# Patient Record
Sex: MALE | Race: WHITE | NOT HISPANIC OR LATINO | Employment: OTHER | URBAN - METROPOLITAN AREA
[De-identification: names, ages, dates, MRNs, and addresses within clinical notes are randomized per-mention and may not be internally consistent; named-entity substitution may affect disease eponyms.]

---

## 2017-02-14 DIAGNOSIS — I10 ESSENTIAL HYPERTENSION: ICD-10-CM

## 2017-02-14 RX ORDER — METOPROLOL TARTRATE 100 MG/1
TABLET ORAL
Qty: 60 TABLET | Refills: 0 | Status: SHIPPED | OUTPATIENT
Start: 2017-02-14 | End: 2017-03-10 | Stop reason: SDUPTHER

## 2017-03-09 PROBLEM — I10 HTN (HYPERTENSION): Status: ACTIVE | Noted: 2017-03-09

## 2017-03-09 PROBLEM — F41.9 ANXIETY DISORDER: Status: ACTIVE | Noted: 2017-03-09

## 2017-03-09 PROBLEM — N40.0 BPH WITHOUT URINARY OBSTRUCTION: Status: ACTIVE | Noted: 2017-03-09

## 2017-03-09 PROBLEM — F32.A DEPRESSION: Status: ACTIVE | Noted: 2017-03-09

## 2017-03-10 ENCOUNTER — OFFICE VISIT (OUTPATIENT)
Dept: FAMILY MEDICINE CLINIC | Facility: CLINIC | Age: 57
End: 2017-03-10

## 2017-03-10 VITALS
OXYGEN SATURATION: 99 % | HEART RATE: 66 BPM | SYSTOLIC BLOOD PRESSURE: 116 MMHG | TEMPERATURE: 98.2 F | WEIGHT: 240 LBS | HEIGHT: 76 IN | DIASTOLIC BLOOD PRESSURE: 78 MMHG | BODY MASS INDEX: 29.22 KG/M2 | RESPIRATION RATE: 20 BRPM

## 2017-03-10 DIAGNOSIS — I10 ESSENTIAL HYPERTENSION: ICD-10-CM

## 2017-03-10 DIAGNOSIS — R39.9 UTI SYMPTOMS: Primary | ICD-10-CM

## 2017-03-10 DIAGNOSIS — N41.0 ACUTE PROSTATITIS: ICD-10-CM

## 2017-03-10 LAB
BILIRUB BLD-MCNC: NEGATIVE MG/DL
CLARITY, POC: CLEAR
COLOR UR: ABNORMAL
GLUCOSE UR STRIP-MCNC: NEGATIVE MG/DL
KETONES UR QL: NEGATIVE
LEUKOCYTE EST, POC: NEGATIVE
NITRITE UR-MCNC: NEGATIVE MG/ML
PH UR: 6 [PH] (ref 5–8)
PROT UR STRIP-MCNC: NEGATIVE MG/DL
RBC # UR STRIP: ABNORMAL /UL
SP GR UR: 1.02 (ref 1–1.03)
UROBILINOGEN UR QL: NORMAL

## 2017-03-10 PROCEDURE — 81003 URINALYSIS AUTO W/O SCOPE: CPT | Performed by: FAMILY MEDICINE

## 2017-03-10 PROCEDURE — 99213 OFFICE O/P EST LOW 20 MIN: CPT | Performed by: FAMILY MEDICINE

## 2017-03-10 RX ORDER — SULFAMETHOXAZOLE AND TRIMETHOPRIM 800; 160 MG/1; MG/1
1 TABLET ORAL 2 TIMES DAILY
Qty: 20 TABLET | Refills: 0 | Status: SHIPPED | OUTPATIENT
Start: 2017-03-10 | End: 2017-05-09

## 2017-03-10 RX ORDER — OXCARBAZEPINE 300 MG/1
150 TABLET, FILM COATED ORAL 2 TIMES DAILY
COMMUNITY
Start: 2016-01-13 | End: 2019-09-19

## 2017-03-10 RX ORDER — TAMSULOSIN HYDROCHLORIDE 0.4 MG/1
1 CAPSULE ORAL NIGHTLY
Qty: 15 CAPSULE | Refills: 1 | Status: SHIPPED | OUTPATIENT
Start: 2017-03-10 | End: 2017-04-10 | Stop reason: SDUPTHER

## 2017-03-10 RX ORDER — METOPROLOL TARTRATE 100 MG/1
100 TABLET ORAL 2 TIMES DAILY
Qty: 60 TABLET | Refills: 11 | Status: SHIPPED | OUTPATIENT
Start: 2017-03-10 | End: 2018-03-05 | Stop reason: SDUPTHER

## 2017-03-10 NOTE — PROGRESS NOTES
"Subjective   Randy Asif is a 57 y.o. male.     Urinary Tract Infection    This is a new problem. The current episode started in the past 7 days. The quality of the pain is described as burning. Pain scale: 9 last night today about 2-3. The pain is moderate. There has been no fever. Associated symptoms include frequency (at night) and hesitancy. He has tried increased fluids for the symptoms. The treatment provided no relief.   Hypertension   This is a recurrent problem. The current episode started more than 1 year ago. The problem is controlled (with Metoprolol tartrate 100 mg twice a day). Risk factors for coronary artery disease include male gender. There are no compliance problems.         The following portions of the patient's history were reviewed and updated as appropriate: allergies, current medications, past social history and problem list.    Review of Systems   Genitourinary: Positive for difficulty urinating, frequency (at night) and hesitancy. Negative for discharge.       Objective   Visit Vitals   • /78 (BP Location: Right arm, Patient Position: Sitting)   • Pulse 66   • Temp 98.2 °F (36.8 °C) (Oral)   • Resp 20   • Ht 76\" (193 cm)   • Wt 240 lb (109 kg)   • SpO2 99%   • BMI 29.21 kg/m2     Physical Exam   Constitutional: He appears well-developed and well-nourished.   Cardiovascular: Normal rate and regular rhythm.    Pulmonary/Chest: Effort normal and breath sounds normal.   Abdominal: Soft. Bowel sounds are normal. There is no tenderness.   Nursing note and vitals reviewed.      Assessment/Plan   Problem List Items Addressed This Visit        Cardiovascular and Mediastinum    HTN (hypertension)      Other Visit Diagnoses     UTI symptoms    -  Primary    Relevant Orders    POCT urinalysis dipstick, automated (Completed)    Acute prostatitis                  Do hot tub soaks for discomfort.    Rx for Tamsulosin 4 mg at bedtime #15+1.    Rx for Bactrim DS mg twice a day for 10 " days.#20+0.    Refill sent for Metoprolol tartrate 100 mg twice a day #60+11.    Follow up as needed.      Scribed for Dr Obinna Toney by Rhea Merrill CMA.    I, Obinna Toney MD, personally performed the services described in this documentation, as scribed by Rhea Merrill in my presence, and is both accurate and complete.

## 2017-04-10 DIAGNOSIS — N41.0 ACUTE PROSTATITIS: ICD-10-CM

## 2017-04-10 RX ORDER — TAMSULOSIN HYDROCHLORIDE 0.4 MG/1
CAPSULE ORAL
Qty: 15 CAPSULE | Refills: 0 | Status: SHIPPED | OUTPATIENT
Start: 2017-04-10 | End: 2017-04-23 | Stop reason: SDUPTHER

## 2017-04-12 ENCOUNTER — TELEPHONE (OUTPATIENT)
Dept: FAMILY MEDICINE CLINIC | Facility: CLINIC | Age: 57
End: 2017-04-12

## 2017-04-12 RX ORDER — SULFAMETHOXAZOLE AND TRIMETHOPRIM 800; 160 MG/1; MG/1
1 TABLET ORAL 2 TIMES DAILY
Qty: 20 TABLET | Refills: 0 | Status: SHIPPED | OUTPATIENT
Start: 2017-04-12 | End: 2017-05-09

## 2017-04-12 NOTE — TELEPHONE ENCOUNTER
----- Message from Leigh Gimenez sent at 4/12/2017  8:06 AM EDT -----  Contact: [phone]  PATIENT NEEDS SOMETHING CALLED IN FOR HIS PROSTATITIS. PATIENT WAS SEEN LAST WEEK        HOLLIS WILLS

## 2017-04-23 DIAGNOSIS — N41.0 ACUTE PROSTATITIS: ICD-10-CM

## 2017-04-24 RX ORDER — TAMSULOSIN HYDROCHLORIDE 0.4 MG/1
CAPSULE ORAL
Qty: 15 CAPSULE | Refills: 0 | Status: SHIPPED | OUTPATIENT
Start: 2017-04-24 | End: 2017-05-17 | Stop reason: SDUPTHER

## 2017-05-09 ENCOUNTER — OFFICE VISIT (OUTPATIENT)
Dept: FAMILY MEDICINE CLINIC | Facility: CLINIC | Age: 57
End: 2017-05-09

## 2017-05-09 VITALS
WEIGHT: 244 LBS | BODY MASS INDEX: 29.71 KG/M2 | OXYGEN SATURATION: 98 % | HEART RATE: 68 BPM | HEIGHT: 76 IN | TEMPERATURE: 97.9 F | DIASTOLIC BLOOD PRESSURE: 68 MMHG | RESPIRATION RATE: 16 BRPM | SYSTOLIC BLOOD PRESSURE: 124 MMHG

## 2017-05-09 DIAGNOSIS — L23.7 POISON IVY: Primary | ICD-10-CM

## 2017-05-09 PROBLEM — G40.209 COMPLEX PARTIAL EPILEPSY (HCC): Status: ACTIVE | Noted: 2017-05-09

## 2017-05-09 PROCEDURE — 99213 OFFICE O/P EST LOW 20 MIN: CPT | Performed by: FAMILY MEDICINE

## 2017-05-09 PROCEDURE — 96372 THER/PROPH/DIAG INJ SC/IM: CPT | Performed by: FAMILY MEDICINE

## 2017-05-09 RX ORDER — METHYLPREDNISOLONE 4 MG/1
TABLET ORAL
Qty: 1 EACH | Refills: 0 | Status: SHIPPED | OUTPATIENT
Start: 2017-05-09 | End: 2017-09-07

## 2017-05-09 RX ORDER — METHYLPREDNISOLONE ACETATE 40 MG/ML
40 INJECTION, SUSPENSION INTRA-ARTICULAR; INTRALESIONAL; INTRAMUSCULAR; SOFT TISSUE ONCE
Status: COMPLETED | OUTPATIENT
Start: 2017-05-09 | End: 2017-05-09

## 2017-05-09 RX ADMIN — METHYLPREDNISOLONE ACETATE 40 MG: 40 INJECTION, SUSPENSION INTRA-ARTICULAR; INTRALESIONAL; INTRAMUSCULAR; SOFT TISSUE at 11:27

## 2017-05-17 DIAGNOSIS — N41.0 ACUTE PROSTATITIS: ICD-10-CM

## 2017-05-17 RX ORDER — TAMSULOSIN HYDROCHLORIDE 0.4 MG/1
CAPSULE ORAL
Qty: 15 CAPSULE | Refills: 0 | Status: SHIPPED | OUTPATIENT
Start: 2017-05-17 | End: 2017-09-07

## 2017-09-07 ENCOUNTER — OFFICE VISIT (OUTPATIENT)
Dept: FAMILY MEDICINE CLINIC | Facility: CLINIC | Age: 57
End: 2017-09-07

## 2017-09-07 VITALS
TEMPERATURE: 99.3 F | SYSTOLIC BLOOD PRESSURE: 122 MMHG | OXYGEN SATURATION: 95 % | RESPIRATION RATE: 18 BRPM | DIASTOLIC BLOOD PRESSURE: 72 MMHG | HEART RATE: 70 BPM | WEIGHT: 247 LBS | HEIGHT: 76 IN | BODY MASS INDEX: 30.08 KG/M2

## 2017-09-07 DIAGNOSIS — I10 ESSENTIAL HYPERTENSION: Primary | ICD-10-CM

## 2017-09-07 PROCEDURE — 99213 OFFICE O/P EST LOW 20 MIN: CPT | Performed by: FAMILY MEDICINE

## 2017-09-07 RX ORDER — INDAPAMIDE 1.25 MG/1
1.25 TABLET, FILM COATED ORAL EVERY MORNING
Qty: 30 TABLET | Refills: 5 | Status: SHIPPED | OUTPATIENT
Start: 2017-09-07 | End: 2017-09-13 | Stop reason: ALTCHOICE

## 2017-09-07 NOTE — PROGRESS NOTES
"Subjective   Randy JAILENE Asif is a 57 y.o. male.     History of Present Illness   The patient is here for a follow up on Hypertension.    He states he is doing well.  Denies any chest pain or shortness of breath.    BP today is  122/72. 150/90 sitting and 155/95 standing on my recheck.  Taking Metoprolol Tartrate 100 mg twice a day.    States he has been having some elevated readings the past 2 weeks.  He has been drinking more iced tea recently.          The following portions of the patient's history were reviewed and updated as appropriate: allergies, current medications, past social history and problem list.    Review of Systems   Constitutional: Negative for diaphoresis and unexpected weight change.   HENT: Negative for congestion.    Eyes: Negative for visual disturbance.   Respiratory: Negative for cough and shortness of breath.    Cardiovascular: Negative for chest pain.   Gastrointestinal: Negative for nausea.   Neurological: Negative for syncope and numbness.       Objective   /72  Pulse 70  Temp 99.3 °F (37.4 °C)  Resp 18  Ht 76\" (193 cm)  Wt 247 lb (112 kg)  SpO2 95%  BMI 30.07 kg/m2  Physical Exam   Constitutional: He is oriented to person, place, and time. He appears well-developed and well-nourished.   Cardiovascular: Normal rate, regular rhythm and normal heart sounds.    Pulmonary/Chest: Effort normal and breath sounds normal. No respiratory distress. He has no wheezes.   Neurological: He is alert and oriented to person, place, and time.   Nursing note and vitals reviewed.      Assessment/Plan   Problem List Items Addressed This Visit        Cardiovascular and Mediastinum    HTN (hypertension) - Primary              Drink plenty fluids.  Avoid added salt    Monitor BP at home as doing.    Continue medications as doing.    Add Indapamide 1.25 mg daily #30+5.    Follow up in 3 month. Sooner if needed.          Scribed for Dr Obinna Toney by Rhea Merrill CMA.            I, Obinna Toney " MD, personally performed the services described in this documentation, as scribed by Rhea Merrill in my presence, and is both accurate and complete.

## 2017-09-13 ENCOUNTER — TELEPHONE (OUTPATIENT)
Dept: FAMILY MEDICINE CLINIC | Facility: CLINIC | Age: 57
End: 2017-09-13

## 2017-09-13 DIAGNOSIS — I10 ESSENTIAL HYPERTENSION: Primary | ICD-10-CM

## 2017-09-13 RX ORDER — LOSARTAN POTASSIUM 25 MG/1
25 TABLET ORAL DAILY
Qty: 30 TABLET | Refills: 5 | Status: SHIPPED | OUTPATIENT
Start: 2017-09-13 | End: 2017-12-27 | Stop reason: SINTOL

## 2017-09-13 NOTE — TELEPHONE ENCOUNTER
I spoke to the patient on the telephone.  He tells me that the indapamide is causing muscle cramps.  We will discontinue that medication and place him instead on losartan 25 mg once a day and given a prescription for 30 with 5 refills.  Continue follow up as noted.

## 2017-12-27 ENCOUNTER — TELEPHONE (OUTPATIENT)
Dept: FAMILY MEDICINE CLINIC | Facility: CLINIC | Age: 57
End: 2017-12-27

## 2017-12-27 DIAGNOSIS — I10 ESSENTIAL HYPERTENSION: Primary | ICD-10-CM

## 2017-12-27 RX ORDER — AMLODIPINE BESYLATE 5 MG/1
2.5 TABLET ORAL DAILY
Qty: 30 TABLET | Refills: 2 | Status: SHIPPED | OUTPATIENT
Start: 2017-12-27 | End: 2017-12-27 | Stop reason: SDUPTHER

## 2017-12-27 RX ORDER — AMLODIPINE BESYLATE 5 MG/1
5 TABLET ORAL DAILY
Qty: 30 TABLET | Refills: 2 | Status: SHIPPED | OUTPATIENT
Start: 2017-12-27 | End: 2018-06-07 | Stop reason: ALTCHOICE

## 2017-12-27 NOTE — TELEPHONE ENCOUNTER
I talked to the patient on the telephone.  He believes that the losartan caused muscle cramps in his arms and legs.  He would like to try different medication.  I will have him discontinue the losartan.  We will call in a prescription for amlodipine 2.5 mg to be taken once daily prescription for 30 with 2 refills.  He'll return to see us in that time.

## 2018-03-05 DIAGNOSIS — I10 ESSENTIAL HYPERTENSION: ICD-10-CM

## 2018-03-06 RX ORDER — METOPROLOL TARTRATE 100 MG/1
TABLET ORAL
Qty: 60 TABLET | Refills: 10 | Status: SHIPPED | OUTPATIENT
Start: 2018-03-06 | End: 2018-05-29 | Stop reason: ALTCHOICE

## 2018-03-15 ENCOUNTER — OFFICE VISIT (OUTPATIENT)
Dept: FAMILY MEDICINE CLINIC | Facility: CLINIC | Age: 58
End: 2018-03-15

## 2018-03-15 VITALS
OXYGEN SATURATION: 97 % | DIASTOLIC BLOOD PRESSURE: 100 MMHG | HEART RATE: 71 BPM | SYSTOLIC BLOOD PRESSURE: 168 MMHG | WEIGHT: 249.2 LBS | BODY MASS INDEX: 30.33 KG/M2 | TEMPERATURE: 99.2 F | RESPIRATION RATE: 18 BRPM

## 2018-03-15 DIAGNOSIS — I10 ESSENTIAL HYPERTENSION: Primary | ICD-10-CM

## 2018-03-15 PROCEDURE — 99213 OFFICE O/P EST LOW 20 MIN: CPT | Performed by: NURSE PRACTITIONER

## 2018-03-16 NOTE — PROGRESS NOTES
Subjective   Randy Asif is a 58 y.o. male.     Hypertension   Chronicity: blood pressure elevated past 2 days. The problem is unchanged. (None) Treatments tried: Norvasc 5mg, Lopressor 100mg. Compliance problems: Patient has missed two days of his Norvasc and states has been eating salty foods past few days (popcorn)  Treatments tried: Norvasc 5mg, Lopressor 100mg.       The following portions of the patient's history were reviewed and updated as appropriate: allergies, current medications, past family history, past medical history, past social history, past surgical history and problem list.  Allergies   Allergen Reactions   • Indapamide Other (See Comments)     Muscle cramps     • Percocet [Oxycodone-Acetaminophen]      Review of Systems   Constitutional: Negative.    HENT: Negative.    Respiratory: Negative.    Cardiovascular: Negative.    Gastrointestinal: Negative.    Musculoskeletal: Negative.    Neurological: Negative.    Psychiatric/Behavioral: Negative.        Objective   Physical Exam   Constitutional: He is oriented to person, place, and time. He appears well-developed and well-nourished. He is cooperative.   Cardiovascular: Normal rate, regular rhythm and normal heart sounds.    Pulmonary/Chest: Effort normal and breath sounds normal.   Neurological: He is alert and oriented to person, place, and time.   Skin: Skin is warm and dry.   Vitals reviewed.    Vitals:    03/15/18 1603   BP: 168/100   Pulse: 71   Resp: 18   Temp: 99.2 °F (37.3 °C)   SpO2: 97%     Assessment/Plan   Randy was seen today for hypertension.    Diagnoses and all orders for this visit:    Essential hypertension    Patient advised to resume taking Norvasc, monitor blood pressures at home and keep a log of readings. Patient to go to ER if blood pressure worsens or any chest pain, SOA, diaphoresis. Patient also advised to decrease sodium intake and increase water intake.  Discussed the nature of the medical condition(s) risks,  complications, implications, management, safe and proper use of medications. Encouraged medication compliance, and keeping scheduled follow up appointments with me and any other providers.

## 2018-03-16 NOTE — PATIENT INSTRUCTIONS
"DASH Eating Plan  DASH stands for \"Dietary Approaches to Stop Hypertension.\" The DASH eating plan is a healthy eating plan that has been shown to reduce high blood pressure (hypertension). It may also reduce your risk for type 2 diabetes, heart disease, and stroke. The DASH eating plan may also help with weight loss.  What are tips for following this plan?  General guidelines   · Avoid eating more than 2,300 mg (milligrams) of salt (sodium) a day. If you have hypertension, you may need to reduce your sodium intake to 1,500 mg a day.  · Limit alcohol intake to no more than 1 drink a day for nonpregnant women and 2 drinks a day for men. One drink equals 12 oz of beer, 5 oz of wine, or 1½ oz of hard liquor.  · Work with your health care provider to maintain a healthy body weight or to lose weight. Ask what an ideal weight is for you.  · Get at least 30 minutes of exercise that causes your heart to beat faster (aerobic exercise) most days of the week. Activities may include walking, swimming, or biking.  · Work with your health care provider or diet and nutrition specialist (dietitian) to adjust your eating plan to your individual calorie needs.  Reading food labels   · Check food labels for the amount of sodium per serving. Choose foods with less than 5 percent of the Daily Value of sodium. Generally, foods with less than 300 mg of sodium per serving fit into this eating plan.  · To find whole grains, look for the word \"whole\" as the first word in the ingredient list.  Shopping   · Buy products labeled as \"low-sodium\" or \"no salt added.\"  · Buy fresh foods. Avoid canned foods and premade or frozen meals.  Cooking   · Avoid adding salt when cooking. Use salt-free seasonings or herbs instead of table salt or sea salt. Check with your health care provider or pharmacist before using salt substitutes.  · Do not rivera foods. Cook foods using healthy methods such as baking, boiling, grilling, and broiling instead.  · Cook with " heart-healthy oils, such as olive, canola, soybean, or sunflower oil.  Meal planning     · Eat a balanced diet that includes:  ¨ 5 or more servings of fruits and vegetables each day. At each meal, try to fill half of your plate with fruits and vegetables.  ¨ Up to 6-8 servings of whole grains each day.  ¨ Less than 6 oz of lean meat, poultry, or fish each day. A 3-oz serving of meat is about the same size as a deck of cards. One egg equals 1 oz.  ¨ 2 servings of low-fat dairy each day.  ¨ A serving of nuts, seeds, or beans 5 times each week.  ¨ Heart-healthy fats. Healthy fats called Omega-3 fatty acids are found in foods such as flaxseeds and coldwater fish, like sardines, salmon, and mackerel.  · Limit how much you eat of the following:  ¨ Canned or prepackaged foods.  ¨ Food that is high in trans fat, such as fried foods.  ¨ Food that is high in saturated fat, such as fatty meat.  ¨ Sweets, desserts, sugary drinks, and other foods with added sugar.  ¨ Full-fat dairy products.  · Do not salt foods before eating.  · Try to eat at least 2 vegetarian meals each week.  · Eat more home-cooked food and less restaurant, buffet, and fast food.  · When eating at a restaurant, ask that your food be prepared with less salt or no salt, if possible.  What foods are recommended?  The items listed may not be a complete list. Talk with your dietitian about what dietary choices are best for you.  Grains   Whole-grain or whole-wheat bread. Whole-grain or whole-wheat pasta. Brown rice. Oatmeal. Quinoa. Bulgur. Whole-grain and low-sodium cereals. Tracey bread. Low-fat, low-sodium crackers. Whole-wheat flour tortillas.  Vegetables   Fresh or frozen vegetables (raw, steamed, roasted, or grilled). Low-sodium or reduced-sodium tomato and vegetable juice. Low-sodium or reduced-sodium tomato sauce and tomato paste. Low-sodium or reduced-sodium canned vegetables.  Fruits   All fresh, dried, or frozen fruit. Canned fruit in natural juice  (without added sugar).  Meat and other protein foods   Skinless chicken or turkey. Ground chicken or turkey. Pork with fat trimmed off. Fish and seafood. Egg whites. Dried beans, peas, or lentils. Unsalted nuts, nut butters, and seeds. Unsalted canned beans. Lean cuts of beef with fat trimmed off. Low-sodium, lean deli meat.  Dairy   Low-fat (1%) or fat-free (skim) milk. Fat-free, low-fat, or reduced-fat cheeses. Nonfat, low-sodium ricotta or cottage cheese. Low-fat or nonfat yogurt. Low-fat, low-sodium cheese.  Fats and oils   Soft margarine without trans fats. Vegetable oil. Low-fat, reduced-fat, or light mayonnaise and salad dressings (reduced-sodium). Canola, safflower, olive, soybean, and sunflower oils. Avocado.  Seasoning and other foods   Herbs. Spices. Seasoning mixes without salt. Unsalted popcorn and pretzels. Fat-free sweets.  What foods are not recommended?  The items listed may not be a complete list. Talk with your dietitian about what dietary choices are best for you.  Grains   Baked goods made with fat, such as croissants, muffins, or some breads. Dry pasta or rice meal packs.  Vegetables   Creamed or fried vegetables. Vegetables in a cheese sauce. Regular canned vegetables (not low-sodium or reduced-sodium). Regular canned tomato sauce and paste (not low-sodium or reduced-sodium). Regular tomato and vegetable juice (not low-sodium or reduced-sodium). Pickles. Olives.  Fruits   Canned fruit in a light or heavy syrup. Fried fruit. Fruit in cream or butter sauce.  Meat and other protein foods   Fatty cuts of meat. Ribs. Fried meat. Garcia. Sausage. Bologna and other processed lunch meats. Salami. Fatback. Hotdogs. Bratwurst. Salted nuts and seeds. Canned beans with added salt. Canned or smoked fish. Whole eggs or egg yolks. Chicken or turkey with skin.  Dairy   Whole or 2% milk, cream, and half-and-half. Whole or full-fat cream cheese. Whole-fat or sweetened yogurt. Full-fat cheese. Nondairy creamers.  Whipped toppings. Processed cheese and cheese spreads.  Fats and oils   Butter. Stick margarine. Lard. Shortening. Ghee. Garcia fat. Tropical oils, such as coconut, palm kernel, or palm oil.  Seasoning and other foods   Salted popcorn and pretzels. Onion salt, garlic salt, seasoned salt, table salt, and sea salt. Worcestershire sauce. Tartar sauce. Barbecue sauce. Teriyaki sauce. Soy sauce, including reduced-sodium. Steak sauce. Canned and packaged gravies. Fish sauce. Oyster sauce. Cocktail sauce. Horseradish that you find on the shelf. Ketchup. Mustard. Meat flavorings and tenderizers. Bouillon cubes. Hot sauce and Tabasco sauce. Premade or packaged marinades. Premade or packaged taco seasonings. Relishes. Regular salad dressings.  Where to find more information:  · National Heart, [employer] [place]: www.nhlbi.nih.gov  · American Heart Association: www.heart.org  Summary  · The DASH eating plan is a healthy eating plan that has been shown to reduce high blood pressure (hypertension). It may also reduce your risk for type 2 diabetes, heart disease, and stroke.  · With the DASH eating plan, you should limit salt (sodium) intake to 2,300 mg a day. If you have hypertension, you may need to reduce your sodium intake to 1,500 mg a day.  · When on the DASH eating plan, aim to eat more fresh fruits and vegetables, whole grains, lean proteins, low-fat dairy, and heart-healthy fats.  · Work with your health care provider or diet and nutrition specialist (dietitian) to adjust your eating plan to your individual calorie needs.  This information is not intended to replace advice given to you by your health care provider. Make sure you discuss any questions you have with your health care provider.  Document Released: 12/06/2012 Document Revised: 12/11/2017 Document Reviewed: 12/11/2017  [employer] Interactive Patient Education © 2017 [employer]  Managing Your Hypertension  Hypertension is commonly called high blood  "pressure. This is when the force of your blood pressing against the walls of your arteries is too strong. Arteries are blood vessels that carry blood from your heart throughout your body. Hypertension forces the heart to work harder to pump blood, and may cause the arteries to become narrow or stiff. Having untreated or uncontrolled hypertension can cause heart attack, stroke, kidney disease, and other problems.  What are blood pressure readings?  A blood pressure reading consists of a higher number over a lower number. Ideally, your blood pressure should be below 120/80. The first (\"top\") number is called the systolic pressure. It is a measure of the pressure in your arteries as your heart beats. The second (\"bottom\") number is called the diastolic pressure. It is a measure of the pressure in your arteries as the heart relaxes.  What does my blood pressure reading mean?  Blood pressure is classified into four stages. Based on your blood pressure reading, your health care provider may use the following stages to determine what type of treatment you need, if any. Systolic pressure and diastolic pressure are measured in a unit called mm Hg.  Normal   · Systolic pressure: below 120.  · Diastolic pressure: below 80.  Elevated   · Systolic pressure: 120-129.  · Diastolic pressure: below 80.  Hypertension stage 1   ·   · Diastolic pressure: 80-89.  Hypertension stage 2   · Systolic pressure: 140 or above.  · Diastolic pressure: 90 or above.  What health risks are associated with hypertension?  Managing your hypertension is an important responsibility. Uncontrolled hypertension can lead to:  · A heart attack.  · A stroke.  · A weakened blood vessel (aneurysm).  · Heart failure.  · Kidney damage.  · Eye damage.  · Metabolic syndrome.  · Memory and concentration problems.  What changes can I make to manage my hypertension?  Eating and drinking   · Eat a diet that is high in fiber and potassium, and low in salt (sodium), " added sugar, and fat. An example eating plan is called the DASH (Dietary Approaches to Stop Hypertension) diet. To eat this way:  ¨ Eat plenty of fresh fruits and vegetables. Try to fill half of your plate at each meal with fruits and vegetables.  ¨ Eat whole grains, such as whole wheat pasta, brown rice, or whole grain bread. Fill about one quarter of your plate with whole grains.  ¨ Eat low-fat diary products.  ¨ Avoid fatty cuts of meat, processed or cured meats, and poultry with skin. Fill about one quarter of your plate with lean proteins such as fish, chicken without skin, beans, eggs, and tofu.  ¨ Avoid premade and processed foods. These tend to be higher in sodium, added sugar, and fat.  ·   ·   Lifestyle   · Work with your health care provider to maintain a healthy body weight, or to lose weight. Ask what an ideal weight is for you.  · Get at least 30 minutes of exercise that causes your heart to beat faster (aerobic exercise) most days of the week. Activities may include walking, swimming, or biking.  ·   ·   ·   Monitoring   · Monitor your blood pressure at home as told by your health care provider. Your personal target blood pressure may vary depending on your medical conditions, your age, and other factors.  · Have your blood pressure checked regularly, as often as told by your health care provider.  Working with your health care provider   · Review all the medicines you take with your health care provider because there may be side effects or interactions.  · Talk with your health care provider about your diet, exercise habits, and other lifestyle factors that may be contributing to hypertension.  · Visit your health care provider regularly. Your health care provider can help you create and adjust your plan for managing hypertension.  Will I need medicine to control my blood pressure?  Your health care provider may prescribe medicine if lifestyle changes are not enough to get your blood pressure under  control, and if:  · Your systolic blood pressure is 130 or higher.  · Your diastolic blood pressure is 80 or higher.  Take medicines only as told by your health care provider. Follow the directions carefully. Blood pressure medicines must be taken as prescribed. The medicine does not work as well when you skip doses. Skipping doses also puts you at risk for problems.  Contact a health care provider if:  · You think you are having a reaction to medicines you have taken.  · You have repeated (recurrent) headaches.  · You feel dizzy.  · You have swelling in your ankles.  · You have trouble with your vision.  Get help right away if:  · You develop a severe headache or confusion.  · You have unusual weakness or numbness, or you feel faint.  · You have severe pain in your chest or abdomen.  · You vomit repeatedly.  · You have trouble breathing.  Summary  · Hypertension is when the force of blood pumping through your arteries is too strong. If this condition is not controlled, it may put you at risk for serious complications.  · Your personal target blood pressure may vary depending on your medical conditions, your age, and other factors. For most people, a normal blood pressure is less than 120/80.  · Hypertension is managed by lifestyle changes, medicines, or both. Lifestyle changes include weight loss, eating a healthy, low-sodium diet, exercising more, and limiting alcohol.  This information is not intended to replace advice given to you by your health care provider. Make sure you discuss any questions you have with your health care provider.  Document Released: 09/11/2013 Document Revised: 11/15/2017 Document Reviewed: 11/15/2017  [employer] Interactive Patient Education © 2017 [employer]    How to Take Your Blood Pressure  Blood pressure is a measurement of how strongly your blood is pressing against the walls of your arteries. Arteries are blood vessels that carry blood from your heart throughout your body. Your  health care provider takes your blood pressure at each office visit. You can also take your own blood pressure at home with a blood pressure machine. You may need to take your own blood pressure:  · To confirm a diagnosis of high blood pressure (hypertension).  · To monitor your blood pressure over time.  · To make sure your blood pressure medicine is working.  Supplies needed:  To take your blood pressure, you will need a blood pressure machine. You can buy a blood pressure machine, or blood pressure monitor, at most [employer] or online. There are several types of home blood pressure monitors. When choosing one, consider the following:  · Choose a monitor that has an arm cuff.  · Choose a monitor that wraps snugly around your upper arm. You should be able to fit only one finger between your arm and the cuff.  · Do not choose a monitor that measures your blood pressure from your wrist or finger.  Your health care provider can suggest a reliable monitor that will meet your needs.  How to prepare  To get the most accurate reading, avoid the following for 30 minutes before you check your blood pressure:  · Drinking caffeine.  · Drinking alcohol.  · Eating.  · Smoking.  · Exercising.  Five minutes before you check your blood pressure:  · Empty your bladder.  · Sit quietly without talking in a dining chair, rather than in a soft couch or armchair.  How to take your blood pressure  To check your blood pressure, follow the instructions in the manual that came with your blood pressure monitor. If you have a digital blood pressure monitor, the instructions may be as follows:  1. Sit up straight.  2. Place your feet on the floor. Do not cross your ankles or legs.  3. Rest your left arm at the level of your heart on a table or desk or on the arm of a chair.  4. Pull up your shirt sleeve.  5. Wrap the blood pressure cuff around the upper part of your left arm, 1 inch (2.5 cm) above your elbow. It is best to wrap the cuff  "around bare skin.  6. Fit the cuff snugly around your arm. You should be able to place only one finger between the cuff and your arm.  7. Position the cord inside the groove of your elbow.  8. Press the power button.  9. Sit quietly while the cuff inflates and deflates.  10. Read the digital reading on the monitor screen and write it down (record it).  11. Wait 2-3 minutes, then repeat the steps, starting at step 1.  What does my blood pressure reading mean?  A blood pressure reading consists of a higher number over a lower number. Ideally, your blood pressure should be below 120/80. The first (\"top\") number is called the systolic pressure. It is a measure of the pressure in your arteries as your heart beats. The second (\"bottom\") number is called the diastolic pressure. It is a measure of the pressure in your arteries as the heart relaxes.  Blood pressure is classified into four stages. The following are the stages for adults who do not have a short-term serious illness or a chronic condition. Systolic pressure and diastolic pressure are measured in a unit called mm Hg.  Normal   · Systolic pressure: below 120.  · Diastolic pressure: below 80.  Elevated   · Systolic pressure: 120-129.  · Diastolic pressure: below 80.  Hypertension stage 1   · Systolic pressure: 130-139.  · Diastolic pressure: 80-89.  Hypertension stage 2   · Systolic pressure: 140 or above.  · Diastolic pressure: 90 or above.  You can have prehypertension or hypertension even if only the systolic or only the diastolic number in your reading is higher than normal.  Follow these instructions at home:  · Check your blood pressure as often as recommended by your health care provider.  · Take your monitor to the next appointment with your health care provider to make sure:  ¨ That you are using it correctly.  ¨ That it provides accurate readings.  · Be sure you understand what your goal blood pressure numbers are.  · Tell your health care provider if " you are having any side effects from blood pressure medicine.  Contact a health care provider if:  · Your blood pressure is consistently high.  Get help right away if:  · Your systolic blood pressure is higher than 180.  · Your diastolic blood pressure is higher than 110.  This information is not intended to replace advice given to you by your health care provider. Make sure you discuss any questions you have with your health care provider.  Document Released: 05/26/2017 Document Revised: 08/08/2017 Document Reviewed: 05/26/2017  [employer] Interactive Patient Education © 2017 [employer]

## 2018-03-20 ENCOUNTER — TELEPHONE (OUTPATIENT)
Dept: FAMILY MEDICINE CLINIC | Facility: CLINIC | Age: 58
End: 2018-03-20

## 2018-03-20 DIAGNOSIS — I10 ESSENTIAL HYPERTENSION: Primary | ICD-10-CM

## 2018-03-20 RX ORDER — LOSARTAN POTASSIUM 50 MG/1
50 TABLET ORAL DAILY
Qty: 30 TABLET | Refills: 2 | Status: SHIPPED | OUTPATIENT
Start: 2018-03-20 | End: 2018-03-22 | Stop reason: SDUPTHER

## 2018-03-21 NOTE — TELEPHONE ENCOUNTER
Spoke with patient and discussed blood pressure medication. Patient agreeable to stop Norvasc and begin Losartan 50mg/day per recommendation Dr. Toney. Patient advised to monitor blood pressure at home and keep a log of readings.

## 2018-03-22 ENCOUNTER — TELEPHONE (OUTPATIENT)
Dept: FAMILY MEDICINE CLINIC | Facility: CLINIC | Age: 58
End: 2018-03-22

## 2018-03-22 DIAGNOSIS — I10 ESSENTIAL HYPERTENSION: ICD-10-CM

## 2018-03-22 RX ORDER — SILDENAFIL CITRATE 20 MG/1
20 TABLET ORAL 2 TIMES DAILY PRN
Qty: 10 TABLET | Refills: 2 | OUTPATIENT
Start: 2018-03-22 | End: 2018-03-30

## 2018-03-22 RX ORDER — LOSARTAN POTASSIUM 50 MG/1
100 TABLET ORAL DAILY
Qty: 30 TABLET | Refills: 2 | OUTPATIENT
Start: 2018-03-22 | End: 2018-06-07 | Stop reason: SDUPTHER

## 2018-03-22 NOTE — TELEPHONE ENCOUNTER
Per dr francis, tell him to increase losartan to 100mg one tablet daily in the am # 30 +2 and sildenafil 20 mg two tablets daily prn for ED #10 + 2. Pt has been notified and voiced his understanding.  ----- Message from Sarahi Wu MA sent at 3/22/2018  2:35 PM EDT -----  Contact: [phone]  Pt stated BP medication  works for 12 hours then starts going up. Pt would also like an rx for viagra.

## 2018-03-30 ENCOUNTER — TELEPHONE (OUTPATIENT)
Dept: FAMILY MEDICINE CLINIC | Facility: CLINIC | Age: 58
End: 2018-03-30

## 2018-03-30 RX ORDER — TADALAFIL 10 MG/1
10 TABLET ORAL DAILY PRN
Qty: 5 TABLET | Refills: 5 | Status: SHIPPED | OUTPATIENT
Start: 2018-03-30 | End: 2018-04-10 | Stop reason: ALTCHOICE

## 2018-03-30 NOTE — TELEPHONE ENCOUNTER
----- Message from Taty Vargas sent at 3/30/2018  1:42 PM EDT -----  Contact: [phone]  PT STATES BP MEDS ARE WORKING EXTREMELY WELL AND PT WOULD LIKE TO SWITCH FROM VIAGRA TO REHAN SHAFER

## 2018-04-10 RX ORDER — SILDENAFIL CITRATE 20 MG/1
TABLET ORAL
Qty: 10 TABLET | Refills: 1 | Status: SHIPPED | OUTPATIENT
Start: 2018-04-10 | End: 2018-04-20 | Stop reason: SDUPTHER

## 2018-04-20 RX ORDER — SILDENAFIL CITRATE 20 MG/1
TABLET ORAL
Qty: 10 TABLET | Refills: 0 | Status: SHIPPED | OUTPATIENT
Start: 2018-04-20 | End: 2018-05-07 | Stop reason: SDUPTHER

## 2018-04-24 RX ORDER — SILDENAFIL CITRATE 20 MG/1
TABLET ORAL
Qty: 10 TABLET | Refills: 0 | OUTPATIENT
Start: 2018-04-24

## 2018-05-04 ENCOUNTER — TELEPHONE (OUTPATIENT)
Dept: FAMILY MEDICINE CLINIC | Facility: CLINIC | Age: 58
End: 2018-05-04

## 2018-05-07 RX ORDER — SILDENAFIL CITRATE 20 MG/1
20 TABLET ORAL AS NEEDED
Qty: 10 TABLET | Refills: 0 | Status: SHIPPED | OUTPATIENT
Start: 2018-05-07 | End: 2018-05-29 | Stop reason: SDUPTHER

## 2018-05-11 RX ORDER — SILDENAFIL CITRATE 20 MG/1
TABLET ORAL
Qty: 10 TABLET | Refills: 0 | OUTPATIENT
Start: 2018-05-11

## 2018-05-29 ENCOUNTER — OFFICE VISIT (OUTPATIENT)
Dept: FAMILY MEDICINE CLINIC | Facility: CLINIC | Age: 58
End: 2018-05-29

## 2018-05-29 VITALS
RESPIRATION RATE: 18 BRPM | HEART RATE: 70 BPM | DIASTOLIC BLOOD PRESSURE: 88 MMHG | WEIGHT: 226.4 LBS | BODY MASS INDEX: 27.56 KG/M2 | SYSTOLIC BLOOD PRESSURE: 142 MMHG | OXYGEN SATURATION: 97 % | TEMPERATURE: 99 F

## 2018-05-29 DIAGNOSIS — R31.9 HEMATURIA, UNSPECIFIED TYPE: ICD-10-CM

## 2018-05-29 DIAGNOSIS — Z76.0 MEDICATION REFILL: ICD-10-CM

## 2018-05-29 DIAGNOSIS — R30.0 DYSURIA: ICD-10-CM

## 2018-05-29 DIAGNOSIS — N41.0 ACUTE PROSTATITIS: Primary | ICD-10-CM

## 2018-05-29 LAB
BILIRUB BLD-MCNC: NEGATIVE MG/DL
CLARITY, POC: CLEAR
COLOR UR: YELLOW
GLUCOSE UR STRIP-MCNC: NEGATIVE MG/DL
KETONES UR QL: ABNORMAL
LEUKOCYTE EST, POC: NEGATIVE
NITRITE UR-MCNC: NEGATIVE MG/ML
PH UR: 6 [PH] (ref 5–8)
PROT UR STRIP-MCNC: NEGATIVE MG/DL
RBC # UR STRIP: ABNORMAL /UL
SP GR UR: 1.01 (ref 1–1.03)
UROBILINOGEN UR QL: NORMAL

## 2018-05-29 PROCEDURE — 81003 URINALYSIS AUTO W/O SCOPE: CPT | Performed by: NURSE PRACTITIONER

## 2018-05-29 PROCEDURE — 99214 OFFICE O/P EST MOD 30 MIN: CPT | Performed by: NURSE PRACTITIONER

## 2018-05-29 PROCEDURE — 87086 URINE CULTURE/COLONY COUNT: CPT | Performed by: NURSE PRACTITIONER

## 2018-05-29 RX ORDER — SILDENAFIL CITRATE 20 MG/1
20 TABLET ORAL AS NEEDED
Qty: 10 TABLET | Refills: 0 | Status: SHIPPED | OUTPATIENT
Start: 2018-05-29 | End: 2018-06-04 | Stop reason: SDUPTHER

## 2018-05-29 RX ORDER — SULFAMETHOXAZOLE AND TRIMETHOPRIM 800; 160 MG/1; MG/1
1 TABLET ORAL 2 TIMES DAILY
Qty: 20 TABLET | Refills: 0 | Status: SHIPPED | OUTPATIENT
Start: 2018-05-29 | End: 2018-06-08

## 2018-05-29 NOTE — PATIENT INSTRUCTIONS
Dysuria  Dysuria is pain or discomfort while urinating. The pain or discomfort may be felt in the tube that carries urine out of the bladder (urethra) or in the surrounding tissue of the genitals. The pain may also be felt in the groin area, lower abdomen, and lower back. You may have to urinate frequently or have the sudden feeling that you have to urinate (urgency). Dysuria can affect both men and women, but is more common in women.  Dysuria can be caused by many different things, including:  · Urinary tract infection in women.  · Infection of the kidney or bladder.  · Kidney stones or bladder stones.  · Certain sexually transmitted infections (STIs), such as chlamydia.  · Dehydration.  · Inflammation of the vagina.  · Use of certain medicines.  · Use of certain soaps or scented products that cause irritation.  Follow these instructions at home:  Watch your dysuria for any changes. The following actions may help to reduce any discomfort you are feeling:  · Drink enough fluid to keep your urine clear or pale yellow.  · Empty your bladder often. Avoid holding urine for long periods of time.  · After a bowel movement or urination, women should cleanse from front to back, using each tissue only once.  · Empty your bladder after sexual intercourse.  · Take medicines only as directed by your health care provider.  · If you were prescribed an antibiotic medicine, finish it all even if you start to feel better.  · Avoid caffeine, tea, and alcohol. They can irritate the bladder and make dysuria worse. In men, alcohol may irritate the prostate.  · Keep all follow-up visits as directed by your health care provider. This is important.  · If you had any tests done to find the cause of dysuria, it is your responsibility to obtain your test results. Ask the lab or department performing the test when and how you will get your results. Talk with your health care provider if you have any questions about your results.  Contact a  health care provider if:  · You develop pain in your back or sides.  · You have a fever.  · You have nausea or vomiting.  · You have blood in your urine.  · You are not urinating as often as you usually do.  Get help right away if:  · You pain is severe and not relieved with medicines.  · You are unable to hold down any fluids.  · You or someone else notices a change in your mental function.  · You have a rapid heartbeat at rest.  · You have shaking or chills.  · You feel extremely weak.  This information is not intended to replace advice given to you by your health care provider. Make sure you discuss any questions you have with your health care provider.  Document Released: 09/15/2005 Document Revised: 05/25/2017 Document Reviewed: 08/13/2015  [employer] Interactive Patient Education © 2017 [employer]    Hematuria, Adult  Hematuria is blood in your urine. It can be caused by a bladder infection, kidney infection, prostate infection, kidney stone, or cancer of your urinary tract. Infections can usually be treated with medicine, and a kidney stone usually will pass through your urine. If neither of these is the cause of your hematuria, further workup to find out the reason may be needed.  It is very important that you tell your health care provider about any blood you see in your urine, even if the blood stops without treatment or happens without causing pain. Blood in your urine that happens and then stops and then happens again can be a symptom of a very serious condition. Also, pain is not a symptom in the initial stages of many urinary cancers.  Follow these instructions at home:  · Drink lots of fluid, 3-4 quarts a day. If you have been diagnosed with an infection, cranberry juice is especially recommended, in addition to large amounts of water.  · Avoid caffeine, tea, and carbonated beverages because they tend to irritate the bladder.  · Avoid alcohol because it may irritate the prostate.  · Take all  medicines as directed by your health care provider.  · If you were prescribed an antibiotic medicine, finish it all even if you start to feel better.  · If you have been diagnosed with a kidney stone, follow your health care provider's instructions regarding straining your urine to catch the stone.  · Empty your bladder often. Avoid holding urine for long periods of time.  · After a bowel movement, women should cleanse front to back. Use each tissue only once.  · Empty your bladder before and after sexual intercourse if you are a female.  Contact a health care provider if:  · You develop back pain.  · You have a fever.  · You have a feeling of sickness in your stomach (nausea) or vomiting.  · Your symptoms are not better in 3 days. Return sooner if you are getting worse.  Get help right away if:  · You develop severe vomiting and are unable to keep the medicine down.  · You develop severe back or abdominal pain despite taking your medicines.  · You begin passing a large amount of blood or clots in your urine.  · You feel extremely weak or faint, or you pass out.  This information is not intended to replace advice given to you by your health care provider. Make sure you discuss any questions you have with your health care provider.  Document Released: 12/18/2006 Document Revised: 05/25/2017 Document Reviewed: 08/18/2014  [employer] Interactive Patient Education © 2017 [employer]    Prostatitis  Prostatitis is swelling or inflammation of the prostate gland. The prostate is a walnut-sized gland that is involved in the production of semen. It is located below a man's bladder, in front of the rectum.  There are four types of prostatitis:  · Chronic nonbacterial prostatitis. This is the most common type of prostatitis. It may be associated with a viral infection or autoimmune disorder.  · Acute bacterial prostatitis. This is the least common type of prostatitis. It starts quickly and is usually associated with a bladder  infection, high fever, and shaking chills. It can occur at any age.  · Chronic bacterial prostatitis. This type usually results from acute bacterial prostatitis that happens repeatedly (is recurrent) or has not been treated properly. It can occur in men of any age but is most common among middle-aged men whose prostate has begun to get larger. The symptoms are not as severe as symptoms caused by acute bacterial prostatitis.  · Prostatodynia or chronic pelvic pain syndrome (CPPS). This type is also called pelvic floor disorder. It is associated with increased muscular tone in the pelvis surrounding the prostate.  What are the causes?  Bacterial prostatitis is caused by infection from bacteria. Chronic nonbacterial prostatitis may be caused by:  · Urinary tract infections (UTIs).  · Nerve damage.  · A response by the body’s disease-fighting system (autoimmune response).  · Chemicals in the urine.  The causes of the other types of prostatitis are usually not known.  What are the signs or symptoms?  Symptoms of this condition vary depending upon the type of prostatitis. If you have acute bacterial prostatitis, you may experience:  · Urinary symptoms, such as:  ¨ Painful urination.  ¨ Burning during urination.  ¨ Frequent and sudden urges to urinate.  ¨ Inability to start urinating.  ¨ A weak or interrupted stream of urine.  · Vomiting.  · Nausea.  · Fever.  · Chills.  · Inability to empty the bladder completely.  · Pain in the:  ¨ Muscles or joints.  ¨ Lower back.  ¨ Lower abdomen.  If you have any of the other types of prostatitis, you may experience:  · Urinary symptoms, such as:  ¨ Sudden urges to urinate.  ¨ Frequent urination.  ¨ Difficulty starting urination.  ¨ Weak urine stream.  ¨ Dribbling after urination.  · Discharge from the urethra. The urethra is a tube that opens at the end of the penis.  · Pain in the:  ¨ Testicles.  ¨ Penis or tip of the penis.  ¨ Rectum.  ¨ Area in front of the rectum and below the  scrotum (perineum).  · Problems with sexual function.  · Painful ejaculation.  · Bloody semen.  How is this diagnosed?  This condition may be diagnosed based on:  · A physical and medical exam.  · Your symptoms.  · A urine test to check for bacteria.  · An exam in which a health care provider uses a finger to feel the prostate (digital rectal exam).  · A test of a sample of semen.  · Blood tests.  · Ultrasound.  · Removal of prostate tissue to be examined under a microscope (biopsy).  · Tests to check how your body handles urine (urodynamic tests).  · A test to look inside your bladder or urethra (cystoscopy).  How is this treated?  Treatment for this condition depends on the type of prostatitis. Treatment may involve:  · Medicines to relieve pain or inflammation.  · Medicines to help relax your muscles.  · Physical therapy.  · Heat therapy.  · Techniques to help you control certain body functions (biofeedback).  · Relaxation exercises.  · Antibiotic medicine, if your condition is caused by bacteria.  · Warm water baths (sitz baths). Sitz baths help with relaxing your pelvic floor muscles, which helps to relieve pressure on the prostate.  Follow these instructions at home:  · Take over-the-counter and prescription medicines only as told by your health care provider.  · If you were prescribed an antibiotic, take it as told by your health care provider. Do not stop taking the antibiotic even if you start to feel better.  · If physical therapy, biofeedback, or relaxation exercises were prescribed, do exercises as instructed.  · Take sitz baths as directed by your health care provider. For a sitz bath, sit in warm water that is deep enough to cover your hips and buttocks.  · Keep all follow-up visits as told by your health care provider. This is important.  Contact a health care provider if:  · Your symptoms get worse.  · You have a fever.  Get help right away if:  · You have chills.  · You feel nauseous.  · You  vomit.  · You feel light-headed or feel like you are going to faint.  · You are unable to urinate.  · You have blood or blood clots in your urine.  This information is not intended to replace advice given to you by your health care provider. Make sure you discuss any questions you have with your health care provider.  Document Released: 12/15/2001 Document Revised: 09/07/2017 Document Reviewed: 09/07/2017  [employer] Interactive Patient Education © 2017 [employer]  Sulfamethoxazole; Trimethoprim, SMX-TMP tablets  What is this medicine?  SULFAMETHOXAZOLE; TRIMETHOPRIM or SMX-TMP (suhl fuh meth OK harjit zohl; trye METH oh prim) is a combination of a sulfonamide antibiotic and a second antibiotic, trimethoprim. It is used to treat or prevent certain kinds of bacterial infections. It will not work for colds, flu, or other viral infections.  This medicine may be used for other purposes; ask your health care provider or pharmacist if you have questions.  COMMON BRAND NAME(S): Bacter-Aid DS, Bactrim, Bactrim DS, Septra, Septra DS  What should I tell my health care provider before I take this medicine?  They need to know if you have any of these conditions:  -anemia  -asthma  -being treated with anticonvulsants  -if you frequently drink alcohol containing drinks  -kidney disease  -liver disease  -low level of folic acid or glucose-6-phosphate dehydrogenase  -poor nutrition or malabsorption  -porphyria  -severe allergies  -thyroid disorder  -an unusual or allergic reaction to sulfamethoxazole, trimethoprim, sulfa drugs, other medicines, foods, dyes, or preservatives  -pregnant or trying to get pregnant  -breast-feeding  How should I use this medicine?  Take this medicine by mouth with a full glass of water. Follow the directions on the prescription label. Take your medicine at regular intervals. Do not take it more often than directed. Do not skip doses or stop your medicine early.  Talk to your pediatrician regarding the use  of this medicine in children. Special care may be needed. This medicine has been used in children as young as 2 months of age.  Overdosage: If you think you have taken too much of this medicine contact a poison control center or emergency room at once.  NOTE: This medicine is only for you. Do not share this medicine with others.  What if I miss a dose?  If you miss a dose, take it as soon as you can. If it is almost time for your next dose, take only that dose. Do not take double or extra doses.  What may interact with this medicine?  Do not take this medicine with any of the following medications:  -aminobenzoate potassium  -dofetilide  -metronidazole  This medicine may also interact with the following medications:  -ACE inhibitors like benazepril, enalapril, lisinopril, and ramipril  -birth control pills  -cyclosporine  -digoxin  -diuretics  -indomethacin  -medicines for diabetes  -methenamine  -methotrexate  -phenytoin  -potassium supplements  -pyrimethamine  -sulfinpyrazone  -tricyclic antidepressants  -warfarin  This list may not describe all possible interactions. Give your health care provider a list of all the medicines, herbs, non-prescription drugs, or dietary supplements you use. Also tell them if you smoke, drink alcohol, or use illegal drugs. Some items may interact with your medicine.  What should I watch for while using this medicine?  Tell your doctor or health care professional if your symptoms do not improve. Drink several glasses of water a day to reduce the risk of kidney problems.  Do not treat diarrhea with over the counter products. Contact your doctor if you have diarrhea that lasts more than 2 days or if it is severe and watery.  This medicine can make you more sensitive to the sun. Keep out of the sun. If you cannot avoid being in the sun, wear protective clothing and use a sunscreen. Do not use sun lamps or tanning beds/booths.  What side effects may I notice from receiving this  medicine?  Side effects that you should report to your doctor or health care professional as soon as possible:  -allergic reactions like skin rash or hives, swelling of the face, lips, or tongue  -breathing problems  -fever or chills, sore throat  -irregular heartbeat, chest pain  -joint or muscle pain  -pain or difficulty passing urine  -red pinpoint spots on skin  -redness, blistering, peeling or loosening of the skin, including inside the mouth  -unusual bleeding or bruising  -unusually weak or tired  -yellowing of the eyes or skin  Side effects that usually do not require medical attention (report to your doctor or health care professional if they continue or are bothersome):  -diarrhea  -dizziness  -headache  -loss of appetite  -nausea, vomiting  -nervousness  This list may not describe all possible side effects. Call your doctor for medical advice about side effects. You may report side effects to FDA at 3-530-FDA-4413.  Where should I keep my medicine?  Keep out of the reach of children.  Store at room temperature between 20 to 25 degrees C (68 to 77 degrees F). Protect from light. Throw away any unused medicine after the expiration date.  NOTE: This sheet is a summary. It may not cover all possible information. If you have questions about this medicine, talk to your doctor, pharmacist, or health care provider.  © 2018 [employer] (2014-07-25 14:38:26)  Sildenafil tablets (Revatio)  What is this medicine?  SILDENAFIL (debra DEN a dion) is used to treat pulmonary arterial hypertension. This is a serious heart and lung condition. This medicine helps to improve symptoms and quality of life.  This medicine may be used for other purposes; ask your health care provider or pharmacist if you have questions.  COMMON BRAND NAME(S): Revatio  What should I tell my health care provider before I take this medicine?  They need to know if you have any of these conditions:  -anatomical deformation of the penis,  Peyronie's disease, or history of priapism (painful and prolonged erection)  -bleeding disorders  -eye disease, vision problems  -heart disease  -high or low blood pressure  -history of blood diseases, like sickle cell anemia or leukemia  -kidney disease  -liver disease  -pulmonary veno-occlusive disease (PVOD)  -stomach ulcer  -an unusual or allergic reaction to sildenafil, other medicines, foods, dyes, or preservatives  -pregnant or trying to get pregnant  -breast-feeding  How should I use this medicine?  Take this medicine by mouth with a glass of water. Follow the directions on the prescription label. You can take it with or without food. If it upsets your stomach, take it with food. Take your doses at regular intervals about 4 to 6 hours apart. Do not take it more often than directed. Do not stop taking except on your doctor's advice.  Talk to your pediatrician regarding the use of this medicine in children. This medicine is not approved for use in children.  Overdosage: If you think you have taken too much of this medicine contact a poison control center or emergency room at once.  NOTE: This medicine is only for you. Do not share this medicine with others.  What if I miss a dose?  If you miss a dose, take it as soon as you can. If it is almost time for your next dose, take only that dose. Do not take double or extra doses.  What may interact with this medicine?  Do not take this medicine with any of the following medications:  -cisapride  -cobicistat  -nitrates like amyl nitrite, isosorbide dinitrate, isosorbide mononitrate, nitroglycerin  -riociguat  -telaprevir  This medicine may also interact with the following medications:  -antiviral medicines for HIV or AIDS  -bosentan  -certain medicines for benign prostatic hyperplasia (BPH)  -certain medicines for blood pressure  -certain medicines for fungal infections like ketoconazole and itraconazole  -cimetidine  -erythromycin  -rifampin  This list may not  describe all possible interactions. Give your health care provider a list of all the medicines, herbs, non-prescription drugs, or dietary supplements you use. Also tell them if you smoke, drink alcohol, or use illegal drugs. Some items may interact with your medicine.  What should I watch for while using this medicine?  Tell your doctor or healthcare professional if your symptoms do not start to get better or if they get worse.  Tell your doctor or health care professional right away if you have any change in your eyesight or hearing.  You may get dizzy. Do not drive, use machinery, or do anything that needs mental alertness until you know how this medicine affects you. Do not stand or sit up quickly, especially if you are an older patient. This reduces the risk of dizzy or fainting spells. Avoid alcoholic drinks; they can make you more dizzy.  What side effects may I notice from receiving this medicine?  Side effects that you should report to your doctor or health care professional as soon as possible:  -allergic reactions like skin rash, itching or hives, swelling of the face, lips, or tongue  -breathing problems  -changes in vision  -chest pain  -decreased hearing  -fast, irregular heartbeat  -men: prolonged or painful erection (lasting more than 4 hours)  Side effects that usually do not require medical attention (report to your doctor or health care professional if they continue or are bothersome):  -facial flushing  -headache  -nosebleed  -trouble sleeping  -upset stomach  This list may not describe all possible side effects. Call your doctor for medical advice about side effects. You may report side effects to FDA at 5-409-FDA-2195.  Where should I keep my medicine?  Keep out of reach of children.  Store at room temperature between 15 and 30 degrees C (59 and 86 degrees F). Throw away any unused medicine after the expiration date.  NOTE: This sheet is a summary. It may not cover all possible information. If  you have questions about this medicine, talk to your doctor, pharmacist, or health care provider.  © 2018 [employer] (2016-11-30 17:18:06)

## 2018-05-30 NOTE — PROGRESS NOTES
Subjective   Randy Asif is a 58 y.o. male.     Difficulty Urinating   This is a new problem. Episode onset: 2-3 days ago. The problem occurs daily. The problem has been gradually worsening. Associated symptoms include urinary symptoms. Pertinent negatives include no abdominal pain, anorexia, arthralgias, change in bowel habit, chest pain, chills, diaphoresis, fatigue, fever, myalgias, nausea, rash or vomiting. Nothing aggravates the symptoms. He has tried drinking for the symptoms. The treatment provided no relief.       The following portions of the patient's history were reviewed and updated as appropriate: allergies, current medications, past family history, past medical history, past social history, past surgical history and problem list.    Review of Systems   Constitutional: Negative for activity change, appetite change, chills, diaphoresis, fatigue and fever.   Respiratory: Negative.    Cardiovascular: Negative.  Negative for chest pain.   Gastrointestinal: Negative.  Negative for abdominal pain, anorexia, change in bowel habit, nausea and vomiting.   Genitourinary: Positive for difficulty urinating, dysuria and hematuria (small amount during urination today). Negative for flank pain.   Musculoskeletal: Negative for arthralgias and myalgias.   Skin: Negative for rash.       Objective   Physical Exam   Constitutional: He is oriented to person, place, and time. Vital signs are normal. He appears well-developed and well-nourished. He is cooperative. No distress.   HENT:   Mouth/Throat: Uvula is midline and mucous membranes are normal.   Cardiovascular: Normal rate, regular rhythm and normal heart sounds.    Pulmonary/Chest: Effort normal and breath sounds normal.   Abdominal: Soft. Normal appearance. He exhibits no distension. There is no tenderness. There is no CVA tenderness.   Neurological: He is alert and oriented to person, place, and time.   Skin: Skin is warm, dry and intact. No rash noted. He is not  diaphoretic.   Psychiatric: He has a normal mood and affect. His behavior is normal. Judgment and thought content normal.   Vitals reviewed.    Vitals:    05/29/18 1533   BP: 142/88   Pulse: 70   Resp: 18   Temp: 99 °F (37.2 °C)   SpO2: 97%   Body mass index is 27.56 kg/m².     Assessment/Plan   Randy was seen today for urinary tract infection.    Diagnoses and all orders for this visit:    Acute prostatitis  -     Urine Culture - Urine, Urine, Clean Catch  -     sulfamethoxazole-trimethoprim (BACTRIM DS) 800-160 MG per tablet; Take 1 tablet by mouth 2 (Two) Times a Day for 10 days.    Dysuria  -     Urine Culture - Urine, Urine, Clean Catch  -     sulfamethoxazole-trimethoprim (BACTRIM DS) 800-160 MG per tablet; Take 1 tablet by mouth 2 (Two) Times a Day for 10 days.    Hematuria, unspecified type  -     POCT urinalysis dipstick, automated  -     Urine Culture - Urine, Urine, Clean Catch    Medication refill  -     sildenafil (REVATIO) 20 MG tablet; Take 1 tablet by mouth As Needed (ED).       Brief Urine Lab Results  (Last result in the past 365 days)      Color   Clarity   Blood   Leuk Est   Nitrite   Protein   CREAT   Urine HCG        05/29/18 1543 Yellow Clear Moderate(A) Negative Negative Negative             Discussed possibility of kidney stones with patient- advised to go to ER if signs and symptoms develop, in particular fever, chills, worsening hematuria, back or abdominal pain. Patient verbalized understanding and agreement with plan of care.  Discussed the nature of the medical condition(s) risks, complications, implications, management, safe and proper use of medications. Encouraged medication compliance, and keeping scheduled follow up appointments with me and any other providers.   F/U with Dr. Toney if symptoms fail to improve.

## 2018-05-31 LAB — BACTERIA SPEC AEROBE CULT: NORMAL

## 2018-06-04 DIAGNOSIS — Z76.0 MEDICATION REFILL: ICD-10-CM

## 2018-06-04 RX ORDER — SILDENAFIL CITRATE 20 MG/1
TABLET ORAL
Qty: 10 TABLET | Refills: 0 | Status: SHIPPED | OUTPATIENT
Start: 2018-06-04 | End: 2018-06-07 | Stop reason: SDUPTHER

## 2018-06-07 ENCOUNTER — OFFICE VISIT (OUTPATIENT)
Dept: FAMILY MEDICINE CLINIC | Facility: CLINIC | Age: 58
End: 2018-06-07

## 2018-06-07 VITALS
BODY MASS INDEX: 27.76 KG/M2 | DIASTOLIC BLOOD PRESSURE: 84 MMHG | WEIGHT: 228 LBS | SYSTOLIC BLOOD PRESSURE: 114 MMHG | HEIGHT: 76 IN | OXYGEN SATURATION: 98 % | HEART RATE: 65 BPM | RESPIRATION RATE: 16 BRPM

## 2018-06-07 DIAGNOSIS — I10 ESSENTIAL HYPERTENSION: ICD-10-CM

## 2018-06-07 DIAGNOSIS — N52.9 ERECTILE DYSFUNCTION, UNSPECIFIED ERECTILE DYSFUNCTION TYPE: ICD-10-CM

## 2018-06-07 DIAGNOSIS — R39.9 UTI SYMPTOMS: Primary | ICD-10-CM

## 2018-06-07 LAB
BILIRUB BLD-MCNC: NEGATIVE MG/DL
CLARITY, POC: CLEAR
COLOR UR: YELLOW
GLUCOSE UR STRIP-MCNC: NEGATIVE MG/DL
KETONES UR QL: NEGATIVE
LEUKOCYTE EST, POC: NEGATIVE
NITRITE UR-MCNC: NEGATIVE MG/ML
PH UR: 6 [PH] (ref 5–8)
PROT UR STRIP-MCNC: NEGATIVE MG/DL
RBC # UR STRIP: NEGATIVE /UL
SP GR UR: 1.01 (ref 1–1.03)
UROBILINOGEN UR QL: NORMAL

## 2018-06-07 PROCEDURE — 81003 URINALYSIS AUTO W/O SCOPE: CPT | Performed by: FAMILY MEDICINE

## 2018-06-07 PROCEDURE — 99213 OFFICE O/P EST LOW 20 MIN: CPT | Performed by: FAMILY MEDICINE

## 2018-06-07 RX ORDER — METOPROLOL TARTRATE 100 MG/1
100 TABLET ORAL EVERY 12 HOURS SCHEDULED
Qty: 180 TABLET | Refills: 1 | Status: SHIPPED | OUTPATIENT
Start: 2018-06-07 | End: 2018-12-21

## 2018-06-07 RX ORDER — LOSARTAN POTASSIUM 100 MG/1
100 TABLET ORAL DAILY
Qty: 90 TABLET | Refills: 1 | OUTPATIENT
Start: 2018-06-07 | End: 2018-06-09 | Stop reason: SDUPTHER

## 2018-06-07 RX ORDER — SILDENAFIL CITRATE 20 MG/1
100 TABLET ORAL DAILY
Qty: 30 TABLET | Refills: 5 | Status: SHIPPED | OUTPATIENT
Start: 2018-06-07 | End: 2018-08-04 | Stop reason: SDUPTHER

## 2018-06-07 NOTE — PROGRESS NOTES
"Subjective   Randy JAILENE Asif is a 58 y.o. male.     History of Present Illness   The patient is here today with c/o blood in his urine last week.    States he is doing better. Has not noticed any blood since then. Has been drinking plenty fluids.  Denies any chest pain or shortness of breath.    BP today is 114/84. Taking Losartan 100 mg daily and Metoprolol tartrate 100 mg twice a day.    The following portions of the patient's history were reviewed and updated as appropriate: allergies, current medications, past social history and problem list.    Review of Systems   Respiratory: Negative for shortness of breath.    Cardiovascular: Negative for chest pain.   Genitourinary: Positive for hematuria.       Objective   /84   Pulse 65   Resp 16   Ht 193 cm (76\")   Wt 103 kg (228 lb)   SpO2 98%   BMI 27.75 kg/m²   Physical Exam   Constitutional: He appears well-developed and well-nourished.   Cardiovascular: Normal rate, regular rhythm and normal heart sounds.    Pulmonary/Chest: He has no wheezes. He has no rales.   Abdominal: There is no tenderness.   Genitourinary: Prostate normal and penis normal.   Nursing note and vitals reviewed.      Assessment/Plan   Problem List Items Addressed This Visit        Cardiovascular and Mediastinum    HTN (hypertension)      Other Visit Diagnoses     UTI symptoms    -  Primary    Relevant Orders    POCT urinalysis dipstick, automated (Completed)    Erectile dysfunction, unspecified erectile dysfunction type          Patient is doing better off of the amlodipine which caused edema.  He is taking metoprolol tartrate half of 100 mg tablet twice a working well.  He also is on losartan 50 mg a day.  We'll have him return to see us in 6 weeks to recheck his blood pressure.  With regards to his hematuria suspected represented a broken blood vessel in his bladder.  Recheck the urine.  With regards to his erectile dysfunction given prescription for sildenafil 20 mg to use 5 " tablets once a day if needed prescription for 30 with 5 refills.        Drink plenty fluids.    Continue medications as doing.    Keep an eye on the urination for now.    Follow up in two weeks.                Scribed for Dr Obinna Toney by Rhea Merrill CMA.

## 2018-06-09 DIAGNOSIS — I10 ESSENTIAL HYPERTENSION: ICD-10-CM

## 2018-06-11 DIAGNOSIS — Z76.0 MEDICATION REFILL: ICD-10-CM

## 2018-06-11 RX ORDER — LOSARTAN POTASSIUM 100 MG/1
TABLET ORAL
Qty: 30 TABLET | Refills: 1 | Status: SHIPPED | OUTPATIENT
Start: 2018-06-11 | End: 2018-08-09 | Stop reason: SDUPTHER

## 2018-06-11 RX ORDER — SILDENAFIL CITRATE 20 MG/1
TABLET ORAL
Qty: 10 TABLET | Refills: 0 | OUTPATIENT
Start: 2018-06-11

## 2018-06-21 ENCOUNTER — OFFICE VISIT (OUTPATIENT)
Dept: FAMILY MEDICINE CLINIC | Facility: CLINIC | Age: 58
End: 2018-06-21

## 2018-06-21 VITALS
HEIGHT: 76 IN | TEMPERATURE: 97.9 F | BODY MASS INDEX: 26.67 KG/M2 | SYSTOLIC BLOOD PRESSURE: 142 MMHG | RESPIRATION RATE: 20 BRPM | OXYGEN SATURATION: 97 % | WEIGHT: 219 LBS | DIASTOLIC BLOOD PRESSURE: 80 MMHG | HEART RATE: 64 BPM

## 2018-06-21 DIAGNOSIS — G40.909 SEIZURE DISORDER (HCC): ICD-10-CM

## 2018-06-21 DIAGNOSIS — R31.0 GROSS HEMATURIA: Primary | ICD-10-CM

## 2018-06-21 LAB
ANION GAP SERPL CALCULATED.3IONS-SCNC: 11 MMOL/L (ref 3–11)
BILIRUB BLD-MCNC: NEGATIVE MG/DL
BUN BLD-MCNC: 9 MG/DL (ref 9–23)
BUN/CREAT SERPL: 13.8 (ref 7–25)
CALCIUM SPEC-SCNC: 9.2 MG/DL (ref 8.7–10.4)
CHLORIDE SERPL-SCNC: 91 MMOL/L (ref 99–109)
CLARITY, POC: CLEAR
CO2 SERPL-SCNC: 26 MMOL/L (ref 20–31)
COLOR UR: YELLOW
CREAT BLD-MCNC: 0.65 MG/DL (ref 0.6–1.3)
GFR SERPL CREATININE-BSD FRML MDRD: 127 ML/MIN/1.73
GLUCOSE BLD-MCNC: 97 MG/DL (ref 70–100)
GLUCOSE UR STRIP-MCNC: NEGATIVE MG/DL
KETONES UR QL: ABNORMAL
LEUKOCYTE EST, POC: NEGATIVE
NITRITE UR-MCNC: NEGATIVE MG/ML
PH UR: 6.5 [PH] (ref 5–8)
POTASSIUM BLD-SCNC: 4.4 MMOL/L (ref 3.5–5.5)
PROT UR STRIP-MCNC: NEGATIVE MG/DL
RBC # UR STRIP: ABNORMAL /UL
SODIUM BLD-SCNC: 128 MMOL/L (ref 132–146)
SP GR UR: 1.01 (ref 1–1.03)
UROBILINOGEN UR QL: NORMAL

## 2018-06-21 PROCEDURE — 36415 COLL VENOUS BLD VENIPUNCTURE: CPT | Performed by: FAMILY MEDICINE

## 2018-06-21 PROCEDURE — 80048 BASIC METABOLIC PNL TOTAL CA: CPT | Performed by: FAMILY MEDICINE

## 2018-06-21 PROCEDURE — 81003 URINALYSIS AUTO W/O SCOPE: CPT | Performed by: FAMILY MEDICINE

## 2018-06-21 PROCEDURE — 84153 ASSAY OF PSA TOTAL: CPT | Performed by: FAMILY MEDICINE

## 2018-06-21 PROCEDURE — 99213 OFFICE O/P EST LOW 20 MIN: CPT | Performed by: FAMILY MEDICINE

## 2018-06-21 NOTE — PROGRESS NOTES
"Subjective   Randy Asif is a 58 y.o. male.     History of Present Illness   The patient is here today for a follow up on Hematuria and prostatitis.    States he has not seen anymore blood in his urination. States he has been drinking more water.  Denies any chest pain or shortness of breath.    States he is walking about 6 and 1/2 miles per day.    The following portions of the patient's history were reviewed and updated as appropriate: allergies, current medications, past social history and problem list.    Review of Systems   Constitutional: Negative for chills and fever.   Respiratory: Negative for shortness of breath.    Cardiovascular: Negative for chest pain.   Genitourinary: Positive for hematuria and penile pain.       Objective   /80   Pulse 64   Temp 97.9 °F (36.6 °C) (Tympanic)   Resp 20   Ht 193 cm (76\")   Wt 99.3 kg (219 lb)   SpO2 97%   BMI 26.66 kg/m²   Physical Exam   Constitutional: He appears well-developed and well-nourished.   Cardiovascular: Normal rate and regular rhythm.    Pulmonary/Chest: Effort normal and breath sounds normal.   Abdominal: Soft. Bowel sounds are normal.   Nursing note and vitals reviewed.      Assessment/Plan   Problem List Items Addressed This Visit     None      Visit Diagnoses     Urine findings abnormal    -  Primary    Relevant Orders    POCT urinalysis dipstick, automated (Completed)      The patient had an episode of gross hematuria with bright red blood in the urine on May 29.  He was treated with antibiotics and increase in oral fluids.  There is partial resolution and indeed the moderate hematuria on urinalysis cleared.  However he still has some discomfort.  He is never seen any more blood.  On urinalysis today he does have a small amount of blood present.  When we review his records we find that he also had a small amount of blood in his urinalysis in 2015.  Because of the ongoing presence of blood in the urine we will check a CT of the pelvis " with and without contrast.  Also check a basic metabolic panel.    He did ask about immunization status.  He's never had a hepatitis A vaccine nor a hepatitis B vaccine.  We advised him about the need and the cost.  He will give consideration.  He also is due for a T DTaP vaccine.    The patient also has a history of a seizure disorder resulting from a previous surgery for brain tumor many years ago.  He does take oxcarbazepine 150 mg tablets 1-1/2 tablet twice a day.  He is had no seizure activity in many years.        Drink plenty fluids.    Continue medications as doing.    Check a BMP.  Schedule for a CT of the Pelvis with IV contrast.    Follow up as needed.                Scribed for Dr Obinna Toney by Rhea Merrill CMA.          I, Obinna Toney MD, personally performed the services described in this documentation, as scribed by Rhea Merrill in my presence, and is both accurate and complete.

## 2018-06-22 LAB — PSA SERPL-MCNC: 1.22 NG/ML (ref 0–4)

## 2018-06-25 ENCOUNTER — HOSPITAL ENCOUNTER (OUTPATIENT)
Dept: CT IMAGING | Facility: HOSPITAL | Age: 58
Discharge: HOME | End: 2018-06-25
Attending: FAMILY MEDICINE | Admitting: FAMILY MEDICINE

## 2018-06-25 DIAGNOSIS — R31.0 GROSS HEMATURIA: ICD-10-CM

## 2018-06-25 PROCEDURE — 25010000002 IOPAMIDOL 61 % SOLUTION: Performed by: FAMILY MEDICINE

## 2018-06-25 PROCEDURE — 72194 CT PELVIS W/O & W/DYE: CPT

## 2018-06-25 RX ADMIN — IOPAMIDOL 85 ML: 612 INJECTION, SOLUTION INTRAVENOUS at 09:40

## 2018-08-04 DIAGNOSIS — N52.9 ERECTILE DYSFUNCTION, UNSPECIFIED ERECTILE DYSFUNCTION TYPE: ICD-10-CM

## 2018-08-08 RX ORDER — SILDENAFIL CITRATE 20 MG/1
TABLET ORAL
Qty: 30 TABLET | Refills: 4 | Status: SHIPPED | OUTPATIENT
Start: 2018-08-08 | End: 2018-09-03 | Stop reason: SDUPTHER

## 2018-08-09 DIAGNOSIS — I10 ESSENTIAL HYPERTENSION: ICD-10-CM

## 2018-08-10 RX ORDER — LOSARTAN POTASSIUM 100 MG/1
TABLET ORAL
Qty: 30 TABLET | Refills: 0 | Status: SHIPPED | OUTPATIENT
Start: 2018-08-10 | End: 2018-09-03 | Stop reason: SDUPTHER

## 2018-09-03 DIAGNOSIS — I10 ESSENTIAL HYPERTENSION: ICD-10-CM

## 2018-09-03 DIAGNOSIS — N52.9 ERECTILE DYSFUNCTION, UNSPECIFIED ERECTILE DYSFUNCTION TYPE: ICD-10-CM

## 2018-09-03 RX ORDER — LOSARTAN POTASSIUM 100 MG/1
TABLET ORAL
Qty: 30 TABLET | Refills: 0 | Status: SHIPPED | OUTPATIENT
Start: 2018-09-03 | End: 2018-10-01 | Stop reason: SDUPTHER

## 2018-09-03 RX ORDER — SILDENAFIL CITRATE 20 MG/1
TABLET ORAL
Qty: 30 TABLET | Refills: 3 | Status: SHIPPED | OUTPATIENT
Start: 2018-09-03 | End: 2018-12-21 | Stop reason: SDUPTHER

## 2018-10-01 DIAGNOSIS — I10 ESSENTIAL HYPERTENSION: ICD-10-CM

## 2018-10-01 RX ORDER — LOSARTAN POTASSIUM 100 MG/1
TABLET ORAL
Qty: 30 TABLET | Refills: 0 | Status: SHIPPED | OUTPATIENT
Start: 2018-10-01 | End: 2018-11-04 | Stop reason: SDUPTHER

## 2018-11-04 DIAGNOSIS — I10 ESSENTIAL HYPERTENSION: ICD-10-CM

## 2018-11-05 RX ORDER — LOSARTAN POTASSIUM 100 MG/1
TABLET ORAL
Qty: 30 TABLET | Refills: 0 | Status: SHIPPED | OUTPATIENT
Start: 2018-11-05 | End: 2018-12-03 | Stop reason: SDUPTHER

## 2018-12-03 DIAGNOSIS — I10 ESSENTIAL HYPERTENSION: ICD-10-CM

## 2018-12-03 RX ORDER — LOSARTAN POTASSIUM 100 MG/1
TABLET ORAL
Qty: 30 TABLET | Refills: 0 | Status: SHIPPED | OUTPATIENT
Start: 2018-12-03 | End: 2019-01-01 | Stop reason: SDUPTHER

## 2018-12-20 DIAGNOSIS — N52.9 ERECTILE DYSFUNCTION, UNSPECIFIED ERECTILE DYSFUNCTION TYPE: ICD-10-CM

## 2018-12-20 RX ORDER — SILDENAFIL CITRATE 20 MG/1
TABLET ORAL
Qty: 60 TABLET | Refills: 2 | Status: CANCELLED | OUTPATIENT
Start: 2018-12-20

## 2018-12-21 ENCOUNTER — OFFICE VISIT (OUTPATIENT)
Dept: FAMILY MEDICINE CLINIC | Facility: CLINIC | Age: 58
End: 2018-12-21

## 2018-12-21 VITALS
BODY MASS INDEX: 25.79 KG/M2 | DIASTOLIC BLOOD PRESSURE: 82 MMHG | OXYGEN SATURATION: 98 % | SYSTOLIC BLOOD PRESSURE: 140 MMHG | HEART RATE: 85 BPM | WEIGHT: 211.8 LBS | RESPIRATION RATE: 20 BRPM | TEMPERATURE: 97.8 F | HEIGHT: 76 IN

## 2018-12-21 DIAGNOSIS — E87.1 HYPONATREMIA: ICD-10-CM

## 2018-12-21 DIAGNOSIS — N52.9 ERECTILE DYSFUNCTION, UNSPECIFIED ERECTILE DYSFUNCTION TYPE: ICD-10-CM

## 2018-12-21 DIAGNOSIS — I10 ESSENTIAL HYPERTENSION: Primary | ICD-10-CM

## 2018-12-21 LAB
ANION GAP SERPL CALCULATED.3IONS-SCNC: 7 MMOL/L (ref 3–11)
BUN BLD-MCNC: 16 MG/DL (ref 9–23)
BUN/CREAT SERPL: 18.8 (ref 7–25)
CALCIUM SPEC-SCNC: 9.4 MG/DL (ref 8.7–10.4)
CHLORIDE SERPL-SCNC: 94 MMOL/L (ref 99–109)
CO2 SERPL-SCNC: 30 MMOL/L (ref 20–31)
CREAT BLD-MCNC: 0.85 MG/DL (ref 0.6–1.3)
GFR SERPL CREATININE-BSD FRML MDRD: 93 ML/MIN/1.73
GLUCOSE BLD-MCNC: 90 MG/DL (ref 70–100)
POTASSIUM BLD-SCNC: 5 MMOL/L (ref 3.5–5.5)
SODIUM BLD-SCNC: 131 MMOL/L (ref 132–146)

## 2018-12-21 PROCEDURE — 80048 BASIC METABOLIC PNL TOTAL CA: CPT | Performed by: FAMILY MEDICINE

## 2018-12-21 PROCEDURE — 99214 OFFICE O/P EST MOD 30 MIN: CPT | Performed by: FAMILY MEDICINE

## 2018-12-21 RX ORDER — LAMOTRIGINE 200 MG/1
200 TABLET ORAL 2 TIMES DAILY
COMMUNITY

## 2018-12-21 RX ORDER — SILDENAFIL CITRATE 20 MG/1
100 TABLET ORAL DAILY PRN
Qty: 120 TABLET | Refills: 5 | Status: SHIPPED | OUTPATIENT
Start: 2018-12-21 | End: 2019-05-09 | Stop reason: SDUPTHER

## 2018-12-21 NOTE — PROGRESS NOTES
"Subjective   Randy Asif is a 58 y.o. male seen today for Hypertension.     History of Present Illness   The patient is here for a follow up on Hypertension.    States he is doing well.  Denies any chest pain or shortness of breath.    BP today is 140/82.  Home readings have been running high. He has a wrist BP cuff.  Taking Losartan 100 mg daily.    States he is in the process of weaning off the Oxcarbazepine and starting Lamictal.      The following portions of the patient's history were reviewed and updated as appropriate: allergies, current medications, past social history and problem list.    Review of Systems   Constitutional: Negative.    HENT: Negative.    Eyes: Negative.    Respiratory: Negative.    Cardiovascular: Negative.    Gastrointestinal: Negative.    Genitourinary: Negative for difficulty urinating, discharge, dysuria, enuresis, flank pain, frequency, genital sores and hematuria.        Erectile dysfunction   Neurological: Negative.        Objective   /82   Pulse 85   Temp 97.8 °F (36.6 °C) (Temporal)   Resp 20   Ht 193 cm (76\")   Wt 96.1 kg (211 lb 12.8 oz)   SpO2 98%   BMI 25.78 kg/m²   Physical Exam   Constitutional: He is oriented to person, place, and time. He appears well-developed and well-nourished.   Eyes: Pupils are equal, round, and reactive to light.   Neck: Normal range of motion. Neck supple.   Cardiovascular: Normal rate and regular rhythm.   No murmur heard.  Pulmonary/Chest: Effort normal and breath sounds normal. He has no wheezes. He has no rales.   Neurological: He is alert and oriented to person, place, and time.   Skin: Skin is warm and dry.   Nursing note and vitals reviewed.      Assessment/Plan   Problem List Items Addressed This Visit        Cardiovascular and Mediastinum    HTN (hypertension) - Primary      Other Visit Diagnoses     Erectile dysfunction, unspecified erectile dysfunction type        Relevant Medications    sildenafil (REVATIO) 20 MG tablet "    Hyponatremia        Relevant Orders    Basic Metabolic Panel (Completed)              Drink plenty fluids.    Continue to wean off the Trileptal by half each week and increase the Lamictal by half each week. When the Lamictal is at 2 twice a day stop the Trileptal.    Continue other medications as doing.    Get a new BP cuff that measures blood pressure in the upper arm.    Refill Sildenafil 20 mg 5 daily if needed #120+3.    Follow up as needed.              Scribed for Dr Obinna Toney by Rhea Merrill CMA.          I, Obinna Toney MD, personally performed the services described in this documentation, as scribed by Rhea Merrill in my presence, and is both accurate and complete.        (Please note that portions of this note were completed with a voice recognition program. Efforts were made to edit the dictations,but occasionally words are mis transcribed.)

## 2019-01-01 DIAGNOSIS — I10 ESSENTIAL HYPERTENSION: ICD-10-CM

## 2019-01-02 RX ORDER — LOSARTAN POTASSIUM 100 MG/1
TABLET ORAL
Qty: 30 TABLET | Refills: 0 | Status: SHIPPED | OUTPATIENT
Start: 2019-01-02 | End: 2019-01-26 | Stop reason: SDUPTHER

## 2019-01-26 DIAGNOSIS — I10 ESSENTIAL HYPERTENSION: ICD-10-CM

## 2019-01-29 RX ORDER — LOSARTAN POTASSIUM 100 MG/1
TABLET ORAL
Qty: 30 TABLET | Refills: 2 | Status: SHIPPED | OUTPATIENT
Start: 2019-01-29 | End: 2019-05-28 | Stop reason: SDUPTHER

## 2019-05-09 ENCOUNTER — OFFICE VISIT (OUTPATIENT)
Dept: FAMILY MEDICINE CLINIC | Facility: CLINIC | Age: 59
End: 2019-05-09

## 2019-05-09 VITALS
BODY MASS INDEX: 25.44 KG/M2 | WEIGHT: 209 LBS | TEMPERATURE: 98.3 F | RESPIRATION RATE: 20 BRPM | DIASTOLIC BLOOD PRESSURE: 70 MMHG | HEART RATE: 76 BPM | SYSTOLIC BLOOD PRESSURE: 146 MMHG | OXYGEN SATURATION: 97 %

## 2019-05-09 DIAGNOSIS — I10 ESSENTIAL HYPERTENSION: Primary | ICD-10-CM

## 2019-05-09 DIAGNOSIS — L30.9 ECZEMA, UNSPECIFIED TYPE: ICD-10-CM

## 2019-05-09 DIAGNOSIS — N52.9 ERECTILE DYSFUNCTION, UNSPECIFIED ERECTILE DYSFUNCTION TYPE: ICD-10-CM

## 2019-05-09 PROCEDURE — 99214 OFFICE O/P EST MOD 30 MIN: CPT | Performed by: FAMILY MEDICINE

## 2019-05-09 RX ORDER — BETAMETHASONE DIPROPIONATE 0.5 MG/G
CREAM TOPICAL DAILY PRN
Qty: 45 G | Refills: 5 | Status: SHIPPED | OUTPATIENT
Start: 2019-05-09 | End: 2019-06-24

## 2019-05-09 RX ORDER — LISINOPRIL 20 MG/1
20 TABLET ORAL DAILY
Qty: 90 TABLET | Refills: 3 | Status: SHIPPED | OUTPATIENT
Start: 2019-05-09 | End: 2019-06-24 | Stop reason: CLARIF

## 2019-05-09 RX ORDER — SILDENAFIL CITRATE 20 MG/1
100 TABLET ORAL DAILY PRN
Qty: 90 TABLET | Refills: 5 | Status: SHIPPED | OUTPATIENT
Start: 2019-05-09 | End: 2020-02-06

## 2019-05-09 NOTE — PROGRESS NOTES
Subjective   Randy Asif is a 59 y.o. male seen today for Hypertension (BP med change).     History of Present Illness   The patient is here for a follow up on Hypertension.     States he is doing well.  Denies any chest pain or shortness of breath.     BP today is 146/70.  Taking Losartan 100 mg daily.    States he has gotten a letter from the pharmacy due to the recall and wanting to change to Lisinopril.    Also c/o rash of both hands that comes and goes. Has lotion and  tried cotton gloves at night without much relief.      The following portions of the patient's history were reviewed and updated as appropriate: allergies, current medications, past social history and problem list.    Review of Systems   Respiratory: Negative for shortness of breath.    Cardiovascular: Negative for chest pain.   Skin: Positive for rash (hands).       Objective   /70 (BP Location: Left arm, Patient Position: Sitting, Cuff Size: Adult)   Pulse 76   Temp 98.3 °F (36.8 °C) (Temporal)   Resp 20   Wt 94.8 kg (209 lb)   SpO2 97%   BMI 25.44 kg/m²   Physical Exam   Constitutional: He is oriented to person, place, and time. He appears well-developed and well-nourished.   HENT:   Head: Normocephalic.   Eyes: Pupils are equal, round, and reactive to light.   Cardiovascular: Normal rate, regular rhythm and normal heart sounds.   No murmur heard.  Pulmonary/Chest: Effort normal and breath sounds normal. He has no rales.   Musculoskeletal: Normal range of motion. He exhibits no edema.   Neurological: He is alert and oriented to person, place, and time.   Skin:   The patient has an eczematous dermatitis of both hands.   Nursing note and vitals reviewed.      Assessment/Plan   Problem List Items Addressed This Visit        Cardiovascular and Mediastinum    HTN (hypertension) - Primary      Other Visit Diagnoses     Eczema, unspecified type        Erectile dysfunction, unspecified erectile dysfunction type                  Drink  plenty fluids.    Stop Losartan.    Rx for Lisinopril 20 mg daily #90+3.    Continue other medications as doing.    Rx for Betamethasone Dipropionate apply once a day #45+5.    Refill given for sildenafil 20 mg 5 tablets once a day as needed #90 with 5 refills.    Follow up in 6 months for a physical.                  Scribed for Dr Obinna Toney by Rhea Merrill CMA.          I, Obinna Toney MD, personally performed the services described in this documentation, as scribed by Rhea Merrill in my presence, and is both accurate and complete.        (Please note that portions of this note were completed with a voice recognition program. Efforts were made to edit the dictations,but occasionally words are mis transcribed.)

## 2019-05-28 DIAGNOSIS — I10 ESSENTIAL HYPERTENSION: ICD-10-CM

## 2019-05-29 RX ORDER — LOSARTAN POTASSIUM 100 MG/1
TABLET ORAL
Qty: 30 TABLET | Refills: 1 | Status: SHIPPED | OUTPATIENT
Start: 2019-05-29 | End: 2019-07-03

## 2019-06-24 ENCOUNTER — OFFICE VISIT (OUTPATIENT)
Dept: FAMILY MEDICINE CLINIC | Facility: CLINIC | Age: 59
End: 2019-06-24

## 2019-06-24 VITALS
HEART RATE: 98 BPM | RESPIRATION RATE: 18 BRPM | DIASTOLIC BLOOD PRESSURE: 82 MMHG | HEIGHT: 76 IN | WEIGHT: 213 LBS | OXYGEN SATURATION: 97 % | TEMPERATURE: 97.9 F | SYSTOLIC BLOOD PRESSURE: 130 MMHG | BODY MASS INDEX: 25.94 KG/M2

## 2019-06-24 DIAGNOSIS — R10.32 ACUTE LEFT LOWER QUADRANT PAIN: ICD-10-CM

## 2019-06-24 DIAGNOSIS — R30.0 DYSURIA: Primary | ICD-10-CM

## 2019-06-24 LAB
BILIRUB BLD-MCNC: NEGATIVE MG/DL
CLARITY, POC: CLEAR
COLOR UR: YELLOW
GLUCOSE UR STRIP-MCNC: NEGATIVE MG/DL
KETONES UR QL: NEGATIVE
LEUKOCYTE EST, POC: NEGATIVE
NITRITE UR-MCNC: NEGATIVE MG/ML
PH UR: 6 [PH] (ref 5–8)
PROT UR STRIP-MCNC: NEGATIVE MG/DL
RBC # UR STRIP: ABNORMAL /UL
SP GR UR: 1.01 (ref 1–1.03)
UROBILINOGEN UR QL: NORMAL

## 2019-06-24 PROCEDURE — 99213 OFFICE O/P EST LOW 20 MIN: CPT | Performed by: NURSE PRACTITIONER

## 2019-06-24 PROCEDURE — 81003 URINALYSIS AUTO W/O SCOPE: CPT | Performed by: NURSE PRACTITIONER

## 2019-06-24 RX ORDER — METRONIDAZOLE 500 MG/1
500 TABLET ORAL 3 TIMES DAILY
Qty: 21 TABLET | Refills: 0 | Status: SHIPPED | OUTPATIENT
Start: 2019-06-24 | End: 2019-07-03 | Stop reason: SDUPTHER

## 2019-06-24 RX ORDER — CIPROFLOXACIN 500 MG/1
500 TABLET, FILM COATED ORAL 2 TIMES DAILY
Qty: 28 TABLET | Refills: 0 | Status: SHIPPED | OUTPATIENT
Start: 2019-06-24 | End: 2019-09-19

## 2019-06-25 NOTE — PROGRESS NOTES
Subjective   Randy Asif is a 59 y.o. male.     History of Present Illness Complains of pain in suprapubic and LLQ for one day. No fever. Has decreased appetite. No vomiting or diarrhea. No melena or hematochezia. Nml BM yesterday.   Has some urinary urgency but no dysuria. History of BPH and diverticulosis. Had hematuria last year. CT without renal stones. PSA 2018 nml.     Outpatient Encounter Medications as of 6/24/2019   Medication Sig Dispense Refill   • lamoTRIgine (LaMICtal) 100 MG tablet Take 200 mg by mouth 2 (Two) Times a Day.     • losartan (COZAAR) 100 MG tablet TAKE ONE TABLET BY MOUTH DAILY 30 tablet 1   • OXcarbazepine (TRILEPTAL) 300 MG tablet Take 150 mg by mouth 2 (Two) Times a Day. Take one and half tab twice a day     • sildenafil (REVATIO) 20 MG tablet Take 5 tablets by mouth Daily As Needed (erectile dysfunction). 90 tablet 5   • [DISCONTINUED] lisinopril (PRINIVIL,ZESTRIL) 20 MG tablet Take 1 tablet by mouth Daily. 90 tablet 3   • ciprofloxacin (CIPRO) 500 MG tablet Take 1 tablet by mouth 2 (Two) Times a Day. 28 tablet 0   • metroNIDAZOLE (FLAGYL) 500 MG tablet Take 1 tablet by mouth 3 (Three) Times a Day. 21 tablet 0   • [DISCONTINUED] betamethasone dipropionate (DIPROLENE) 0.05 % cream Apply  topically to the appropriate area as directed Daily As Needed for Rash. 45 g 5     No facility-administered encounter medications on file as of 6/24/2019.        The following portions of the patient's history were reviewed and updated as appropriate: allergies, current medications, past family history, past medical history, past social history, past surgical history and problem list.    Review of Systems   Constitutional: Negative for appetite change, fever and unexpected weight loss.   HENT: Negative for nosebleeds, sore throat and trouble swallowing.    Eyes: Negative for visual disturbance.   Respiratory: Negative for cough, shortness of breath and wheezing.    Cardiovascular: Negative for chest  "pain, palpitations and leg swelling.   Gastrointestinal: Positive for abdominal pain. Negative for blood in stool, constipation, diarrhea, nausea and vomiting.   Endocrine: Negative for polydipsia, polyphagia and polyuria.   Genitourinary: Positive for urgency. Negative for urinary incontinence, dysuria, frequency and hematuria.   Musculoskeletal: Negative for arthralgias, gait problem, joint swelling and myalgias.   Skin: Negative for rash.   Neurological: Negative for dizziness, seizures, syncope and numbness.   Hematological: Negative for adenopathy. Does not bruise/bleed easily.   Psychiatric/Behavioral: Negative for sleep disturbance and depressed mood. The patient is not nervous/anxious.        Objective     Visit Vitals  /82 (BP Location: Left arm, Patient Position: Sitting)   Pulse 98   Temp 97.9 °F (36.6 °C) (Temporal)   Resp 18   Ht 193 cm (76\")   Wt 96.6 kg (213 lb)   SpO2 97%   BMI 25.93 kg/m²       Physical Exam   Constitutional: He is oriented to person, place, and time. He appears well-developed and well-nourished. No distress.   HENT:   Head: Normocephalic and atraumatic.   Right Ear: Tympanic membrane and external ear normal.   Left Ear: Tympanic membrane and external ear normal.   Nose: Nose normal.   Mouth/Throat: Oropharynx is clear and moist. No oropharyngeal exudate.   Eyes: Conjunctivae are normal. Pupils are equal, round, and reactive to light. Right eye exhibits no discharge. Left eye exhibits no discharge. No scleral icterus.   Neck: Neck supple. No tracheal deviation present. No thyromegaly present.   Cardiovascular: Normal rate, regular rhythm and normal heart sounds. Exam reveals no gallop and no friction rub.   No murmur heard.  Pulmonary/Chest: Effort normal and breath sounds normal. No respiratory distress. He has no wheezes.   Abdominal: Soft. Bowel sounds are normal. He exhibits no distension and no mass. There is tenderness.   Musculoskeletal: He exhibits no edema or " deformity.   Lymphadenopathy:     He has no cervical adenopathy.   Neurological: He is alert and oriented to person, place, and time. Coordination normal.   Skin: Skin is warm and dry. Capillary refill takes less than 2 seconds. No rash noted. No erythema.   Psychiatric: He has a normal mood and affect. His speech is normal and behavior is normal. Judgment and thought content normal.   Nursing note and vitals reviewed.        Assessment/Plan   Randy was seen today for difficulty urinating.    Diagnoses and all orders for this visit:    Dysuria  -     POC Urinalysis Dipstick, Automated    Acute left lower quadrant pain  -     ciprofloxacin (CIPRO) 500 MG tablet; Take 1 tablet by mouth 2 (Two) Times a Day.  -     metroNIDAZOLE (FLAGYL) 500 MG tablet; Take 1 tablet by mouth 3 (Three) Times a Day.    He is without insurance at this time and does not want a urine culture, or other diagnostics.  Will treat with cipro which will cover diverticulosis and UTI.   Close follow up in 1-2 days.  Discussed red flags for fever, chills, nausea, vomiting to go to ER. He verbalized understanding.

## 2019-07-03 ENCOUNTER — OFFICE VISIT (OUTPATIENT)
Dept: FAMILY MEDICINE CLINIC | Facility: CLINIC | Age: 59
End: 2019-07-03

## 2019-07-03 VITALS
HEART RATE: 82 BPM | SYSTOLIC BLOOD PRESSURE: 130 MMHG | TEMPERATURE: 98.4 F | HEIGHT: 76 IN | BODY MASS INDEX: 25.33 KG/M2 | WEIGHT: 208 LBS | DIASTOLIC BLOOD PRESSURE: 90 MMHG | OXYGEN SATURATION: 93 % | RESPIRATION RATE: 16 BRPM

## 2019-07-03 DIAGNOSIS — K57.32 SIGMOID DIVERTICULITIS: Primary | ICD-10-CM

## 2019-07-03 PROCEDURE — 99213 OFFICE O/P EST LOW 20 MIN: CPT | Performed by: FAMILY MEDICINE

## 2019-07-03 RX ORDER — LISINOPRIL 20 MG/1
20 TABLET ORAL DAILY
COMMUNITY
End: 2019-09-19 | Stop reason: ALTCHOICE

## 2019-07-03 RX ORDER — METRONIDAZOLE 500 MG/1
500 TABLET ORAL 2 TIMES DAILY
Qty: 14 TABLET | Refills: 0 | Status: SHIPPED | OUTPATIENT
Start: 2019-07-03 | End: 2019-09-19

## 2019-07-03 NOTE — PROGRESS NOTES
"Subjective   Randy JAILENE Asif is a 59 y.o. male seen today for Abdominal Pain.     Abdominal Pain   This is a new problem. The problem occurs intermittently. The problem has been waxing and waning. The pain is located in the RLQ and RUQ (mainly left sided occasionally right sided.). The pain is mild. The quality of the pain is aching. The abdominal pain does not radiate. Pertinent negatives include no constipation or diarrhea (loose stools). The pain is aggravated by palpation. He has tried antibiotics (Cipro 500 mg and Flagyl 500 mg) for the symptoms. The treatment provided mild relief.        The following portions of the patient's history were reviewed and updated as appropriate: allergies, current medications, past social history and problem list.    Review of Systems   Respiratory: Negative for shortness of breath.    Cardiovascular: Negative for chest pain.   Gastrointestinal: Positive for abdominal pain. Negative for constipation and diarrhea (loose stools).       Objective   /90   Pulse 82   Temp 98.4 °F (36.9 °C)   Resp 16   Ht 193 cm (76\")   Wt 94.3 kg (208 lb)   SpO2 93%   BMI 25.32 kg/m²   Physical Exam   Constitutional: He appears well-developed and well-nourished.   Cardiovascular: Normal rate and regular rhythm.   Pulmonary/Chest: Effort normal and breath sounds normal.   Abdominal: Soft. Bowel sounds are normal. There is no tenderness.   Is one focal area at the left lower quadrant of the abdomen that is mildly tender.   Nursing note and vitals reviewed.      Assessment/Plan   Problem List Items Addressed This Visit     None      Visit Diagnoses     Sigmoid diverticulitis    -  Primary              Drink plenty fluids.    Use OTC probiotic twice a day while on antibiotics.  Restart Fiber One daily.    Rx for Flagyl 500 mg twice a day #14+0.    Follow up as needed.              Scribed for Dr Obinna Toney by Rhea Merrill CMA.          I, Obinna Toney MD, personally performed the " services described in this documentation, as scribed by Rhea Merrill in my presence, and is both accurate and complete.        (Please note that portions of this note were completed with a voice recognition program. Efforts were made to edit the dictations,but occasionally words are mis transcribed.)

## 2019-09-19 ENCOUNTER — OFFICE VISIT (OUTPATIENT)
Dept: FAMILY MEDICINE CLINIC | Facility: CLINIC | Age: 59
End: 2019-09-19

## 2019-09-19 VITALS
DIASTOLIC BLOOD PRESSURE: 90 MMHG | OXYGEN SATURATION: 97 % | SYSTOLIC BLOOD PRESSURE: 148 MMHG | HEART RATE: 96 BPM | RESPIRATION RATE: 22 BRPM | TEMPERATURE: 98.1 F | BODY MASS INDEX: 25.57 KG/M2 | HEIGHT: 76 IN | WEIGHT: 210 LBS

## 2019-09-19 DIAGNOSIS — I10 ESSENTIAL HYPERTENSION: Primary | ICD-10-CM

## 2019-09-19 PROCEDURE — 99213 OFFICE O/P EST LOW 20 MIN: CPT | Performed by: FAMILY MEDICINE

## 2019-09-19 RX ORDER — LOSARTAN POTASSIUM 100 MG/1
100 TABLET ORAL DAILY
Qty: 90 TABLET | Refills: 1 | Status: SHIPPED | OUTPATIENT
Start: 2019-09-19 | End: 2019-10-24 | Stop reason: SDUPTHER

## 2019-09-19 NOTE — PROGRESS NOTES
"Subjective   Randy Asif is a 59 y.o. male seen today for Hypertension.     History of Present Illness   The patient is here for a follow up on Hypertension.    States he is doing well.  Denies any chest pain or shortness of breath.    BP today is 148/90.  Taking Lisinopril 20 mg daily.States he is having a cough since starting Lisinopril.  Wanting to restart Losartan 50 mg.      The following portions of the patient's history were reviewed and updated as appropriate: allergies, current medications, past social history and problem list.    Review of Systems   Respiratory: Negative for shortness of breath.    Cardiovascular: Negative for chest pain.       Objective   /90   Pulse 96   Temp 98.1 °F (36.7 °C)   Resp 22   Ht 193 cm (76\")   Wt 95.3 kg (210 lb)   SpO2 97%   BMI 25.56 kg/m²   Physical Exam   Constitutional: He appears well-developed and well-nourished.   Cardiovascular: Normal rate and regular rhythm.   Pulmonary/Chest: Effort normal and breath sounds normal.   Nursing note and vitals reviewed.      Assessment/Plan   Problem List Items Addressed This Visit        Cardiovascular and Mediastinum    HTN (hypertension) - Primary    Relevant Medications    losartan (COZAAR) 100 MG tablet              Drink plenty fluids.    Stop lisinopril.  Rx for losartan 100 mg daily. #90+1.    Follow up in 6 months for a physical.                 Scribed for Dr Obinna Toney by Rhea Merrill CMA.          I, Obinna Toney MD, personally performed the services described in this documentation, as scribed by Rhea Merrill in my presence, and is both accurate and complete.        (Please note that portions of this note were completed with a voice recognition program. Efforts were made to edit the dictations,but occasionally words are mis transcribed.)      "

## 2019-10-11 DIAGNOSIS — N52.9 ERECTILE DYSFUNCTION, UNSPECIFIED ERECTILE DYSFUNCTION TYPE: ICD-10-CM

## 2019-10-11 RX ORDER — SILDENAFIL CITRATE 20 MG/1
TABLET ORAL
Qty: 120 TABLET | Refills: 4 | Status: SHIPPED | OUTPATIENT
Start: 2019-10-11 | End: 2019-10-24 | Stop reason: SDUPTHER

## 2019-10-24 ENCOUNTER — OFFICE VISIT (OUTPATIENT)
Dept: FAMILY MEDICINE CLINIC | Facility: CLINIC | Age: 59
End: 2019-10-24

## 2019-10-24 VITALS
HEART RATE: 104 BPM | BODY MASS INDEX: 25.94 KG/M2 | TEMPERATURE: 97.9 F | HEIGHT: 76 IN | OXYGEN SATURATION: 96 % | RESPIRATION RATE: 22 BRPM | DIASTOLIC BLOOD PRESSURE: 70 MMHG | SYSTOLIC BLOOD PRESSURE: 140 MMHG | WEIGHT: 213 LBS

## 2019-10-24 DIAGNOSIS — I10 ESSENTIAL HYPERTENSION: Primary | ICD-10-CM

## 2019-10-24 PROCEDURE — 99213 OFFICE O/P EST LOW 20 MIN: CPT | Performed by: FAMILY MEDICINE

## 2019-10-24 RX ORDER — LOSARTAN POTASSIUM 100 MG/1
100 TABLET ORAL DAILY
Qty: 90 TABLET | Refills: 3 | Status: SHIPPED | OUTPATIENT
Start: 2019-10-24 | End: 2020-10-19

## 2020-01-16 ENCOUNTER — OFFICE VISIT (OUTPATIENT)
Dept: FAMILY MEDICINE CLINIC | Facility: CLINIC | Age: 60
End: 2020-01-16

## 2020-01-16 VITALS
HEIGHT: 76 IN | DIASTOLIC BLOOD PRESSURE: 120 MMHG | TEMPERATURE: 97.6 F | BODY MASS INDEX: 26.91 KG/M2 | WEIGHT: 221 LBS | OXYGEN SATURATION: 97 % | SYSTOLIC BLOOD PRESSURE: 180 MMHG | HEART RATE: 53 BPM | RESPIRATION RATE: 23 BRPM

## 2020-01-16 DIAGNOSIS — I10 ESSENTIAL HYPERTENSION: Primary | ICD-10-CM

## 2020-01-16 PROCEDURE — 99213 OFFICE O/P EST LOW 20 MIN: CPT | Performed by: FAMILY MEDICINE

## 2020-01-16 RX ORDER — CARVEDILOL 12.5 MG/1
12.5 TABLET ORAL 2 TIMES DAILY WITH MEALS
Qty: 60 TABLET | Refills: 1 | Status: SHIPPED | OUTPATIENT
Start: 2020-01-16 | End: 2020-01-21 | Stop reason: SDUPTHER

## 2020-01-16 NOTE — PROGRESS NOTES
"Subjective   Randy JAILENE Asif is a 59 y.o. male seen today for Hypertension.     History of Present Illness   The patient is here today for a follow up on Hypertension.    States he is doing well.  Denies any chest pain or shortness of breath.  Walking about 4 miles everyday.    BP today is 180/120. 175/108 on recheck.  Taking Losartan 100 mg daily.  States his mother is in the Hospital with a stroke and has had a lot of stress with that.    Sees Dr Carrera for the Epilepsy.    The following portions of the patient's history were reviewed and updated as appropriate: allergies, current medications, past social history and problem list.    Review of Systems   Constitutional: Positive for unexpected weight change. Negative for activity change, appetite change and fatigue.   HENT: Negative for congestion.    Eyes: Negative for visual disturbance.   Respiratory: Negative for shortness of breath.    Cardiovascular: Negative for chest pain, palpitations and leg swelling.   Gastrointestinal: Negative for nausea.   Endocrine: Negative for polyuria.   Genitourinary: Negative for dysuria.   Musculoskeletal: Negative for arthralgias and joint swelling.   Neurological: Negative for dizziness, light-headedness and headaches.   Hematological: Negative for adenopathy.   Psychiatric/Behavioral: Negative for confusion and dysphoric mood.       Objective   BP (!) 180/120   Pulse 53   Temp 97.6 °F (36.4 °C)   Resp 23   Ht 193 cm (76\")   Wt 100 kg (221 lb)   SpO2 97%   BMI 26.90 kg/m²   Physical Exam   Constitutional: He appears well-developed and well-nourished.   HENT:   Mouth/Throat: Oropharynx is clear and moist.   Eyes: Pupils are equal, round, and reactive to light.   Neck: Normal range of motion. Neck supple.   Cardiovascular: Normal rate and regular rhythm.   No murmur heard.  Pulmonary/Chest: Effort normal and breath sounds normal. He has no rales.   Abdominal: Soft. Bowel sounds are normal. There is no tenderness. "   Skin: Skin is warm and dry.       ECG 12 Lead  Date/Time: 1/19/2020 4:04 PM  Performed by: Obinna Toney MD  Authorized by: Obinna Toney MD   Comparison: not compared with previous ECG   Rhythm: sinus tachycardia  Rate: tachycardic  BPM: 101  Q waves: II and aVF    ST Elevation: V2, V3, V4, V5 and V6  T Waves: T waves normal  QRS axis: normal  Other: no other findings  Other findings: left ventricular hypertrophy with strain    Clinical impression: abnormal EKG            Assessment/Plan   Problem List Items Addressed This Visit        Cardiovascular and Mediastinum    HTN (hypertension) - Primary              Drink plenty fluids.    Add Carvedilol 12.5 mg twice a day. #60+1    Continue other medications as doing.    Follow up in 5 days            Scribed for Dr Obinna Toney by Rhea Merrill CMA.          I, Obinna Toney MD, personally performed the services described in this documentation, as scribed by Rhea Merrill in my presence, and is both accurate and complete.        (Please note that portions of this note were completed with a voice recognition program. Efforts were made to edit the dictations,but occasionally words are mis transcribed.)

## 2020-01-19 PROCEDURE — 93000 ELECTROCARDIOGRAM COMPLETE: CPT | Performed by: FAMILY MEDICINE

## 2020-01-21 ENCOUNTER — OFFICE VISIT (OUTPATIENT)
Dept: FAMILY MEDICINE CLINIC | Facility: CLINIC | Age: 60
End: 2020-01-21

## 2020-01-21 VITALS
BODY MASS INDEX: 27.16 KG/M2 | TEMPERATURE: 98.4 F | HEIGHT: 76 IN | RESPIRATION RATE: 21 BRPM | SYSTOLIC BLOOD PRESSURE: 170 MMHG | WEIGHT: 223 LBS | DIASTOLIC BLOOD PRESSURE: 110 MMHG | OXYGEN SATURATION: 97 % | HEART RATE: 77 BPM

## 2020-01-21 DIAGNOSIS — I10 ESSENTIAL HYPERTENSION: Primary | ICD-10-CM

## 2020-01-21 DIAGNOSIS — E29.1 HYPOGONADISM MALE: ICD-10-CM

## 2020-01-21 LAB
ALBUMIN SERPL-MCNC: 4.6 G/DL (ref 3.5–5.2)
ALBUMIN/GLOB SERPL: 1.8 G/DL
ALP SERPL-CCNC: 69 U/L (ref 39–117)
ALT SERPL W P-5'-P-CCNC: 30 U/L (ref 1–41)
ANION GAP SERPL CALCULATED.3IONS-SCNC: 12.7 MMOL/L (ref 5–15)
AST SERPL-CCNC: 24 U/L (ref 1–40)
BASOPHILS # BLD AUTO: 0.07 10*3/MM3 (ref 0–0.2)
BASOPHILS NFR BLD AUTO: 0.9 % (ref 0–1.5)
BILIRUB SERPL-MCNC: 0.6 MG/DL (ref 0.2–1.2)
BUN BLD-MCNC: 13 MG/DL (ref 6–20)
BUN/CREAT SERPL: 11.6 (ref 7–25)
CALCIUM SPEC-SCNC: 10.2 MG/DL (ref 8.6–10.5)
CHLORIDE SERPL-SCNC: 98 MMOL/L (ref 98–107)
CO2 SERPL-SCNC: 29.3 MMOL/L (ref 22–29)
CREAT BLD-MCNC: 1.12 MG/DL (ref 0.76–1.27)
DEPRECATED RDW RBC AUTO: 43.6 FL (ref 37–54)
EOSINOPHIL # BLD AUTO: 0.29 10*3/MM3 (ref 0–0.4)
EOSINOPHIL NFR BLD AUTO: 3.6 % (ref 0.3–6.2)
ERYTHROCYTE [DISTWIDTH] IN BLOOD BY AUTOMATED COUNT: 13.5 % (ref 12.3–15.4)
GFR SERPL CREATININE-BSD FRML MDRD: 67 ML/MIN/1.73
GLOBULIN UR ELPH-MCNC: 2.5 GM/DL
GLUCOSE BLD-MCNC: 112 MG/DL (ref 65–99)
HCT VFR BLD AUTO: 57.7 % (ref 37.5–51)
HGB BLD-MCNC: 18.8 G/DL (ref 13–17.7)
IMM GRANULOCYTES # BLD AUTO: 0.08 10*3/MM3 (ref 0–0.05)
IMM GRANULOCYTES NFR BLD AUTO: 1 % (ref 0–0.5)
LYMPHOCYTES # BLD AUTO: 1.48 10*3/MM3 (ref 0.7–3.1)
LYMPHOCYTES NFR BLD AUTO: 18.4 % (ref 19.6–45.3)
MCH RBC QN AUTO: 29 PG (ref 26.6–33)
MCHC RBC AUTO-ENTMCNC: 32.6 G/DL (ref 31.5–35.7)
MCV RBC AUTO: 89 FL (ref 79–97)
MONOCYTES # BLD AUTO: 0.91 10*3/MM3 (ref 0.1–0.9)
MONOCYTES NFR BLD AUTO: 11.3 % (ref 5–12)
NEUTROPHILS # BLD AUTO: 5.23 10*3/MM3 (ref 1.7–7)
NEUTROPHILS NFR BLD AUTO: 64.8 % (ref 42.7–76)
NRBC BLD AUTO-RTO: 0 /100 WBC (ref 0–0.2)
PLATELET # BLD AUTO: 212 10*3/MM3 (ref 140–450)
PMV BLD AUTO: 9.9 FL (ref 6–12)
POTASSIUM BLD-SCNC: 5.7 MMOL/L (ref 3.5–5.2)
PROT SERPL-MCNC: 7.1 G/DL (ref 6–8.5)
RBC # BLD AUTO: 6.48 10*6/MM3 (ref 4.14–5.8)
SODIUM BLD-SCNC: 140 MMOL/L (ref 136–145)
WBC NRBC COR # BLD: 8.06 10*3/MM3 (ref 3.4–10.8)

## 2020-01-21 PROCEDURE — 84402 ASSAY OF FREE TESTOSTERONE: CPT | Performed by: FAMILY MEDICINE

## 2020-01-21 PROCEDURE — 84403 ASSAY OF TOTAL TESTOSTERONE: CPT | Performed by: FAMILY MEDICINE

## 2020-01-21 PROCEDURE — 85025 COMPLETE CBC W/AUTO DIFF WBC: CPT | Performed by: FAMILY MEDICINE

## 2020-01-21 PROCEDURE — 80053 COMPREHEN METABOLIC PANEL: CPT | Performed by: FAMILY MEDICINE

## 2020-01-21 PROCEDURE — 99213 OFFICE O/P EST LOW 20 MIN: CPT | Performed by: FAMILY MEDICINE

## 2020-01-21 RX ORDER — CARVEDILOL 25 MG/1
25 TABLET ORAL 2 TIMES DAILY WITH MEALS
Qty: 180 TABLET | Refills: 1 | Status: SHIPPED | OUTPATIENT
Start: 2020-01-21 | End: 2020-07-18

## 2020-01-21 RX ORDER — TESTOSTERONE CYPIONATE 200 MG/ML
0.5 VIAL (ML) INTRAMUSCULAR
COMMUNITY
End: 2020-04-23

## 2020-01-21 NOTE — PROGRESS NOTES
"Subjective   Randy JAILENE Asif is a 59 y.o. male seen today for Hypertension.     History of Present Illness   The patient is here today for a follow up on Hypertension.    States he is not doing much better.  Denies any chest pain or shortness of breath.    BP today is 170/110.  Taking Losartan 100 mg daily and Carvedilol 12.5 mg twice a day.    Taking Testosterone Cypionate 200 mg/ML. 0.5 ML twice a week.    The following portions of the patient's history were reviewed and updated as appropriate: allergies, current medications, past social history and problem list.    Review of Systems   Constitutional: Negative for activity change and appetite change.   HENT: Negative for congestion.    Respiratory: Negative for shortness of breath.    Cardiovascular: Negative for chest pain.   Genitourinary: Negative for difficulty urinating.   Neurological: Negative for dizziness, seizures, syncope and headaches.       Objective   BP (!) 170/110   Pulse 77   Temp 98.4 °F (36.9 °C)   Resp 21   Ht 193 cm (76\")   Wt 101 kg (223 lb)   SpO2 97%   BMI 27.14 kg/m²   Physical Exam   Constitutional: He is oriented to person, place, and time. He appears well-developed and well-nourished.   Eyes: Pupils are equal, round, and reactive to light. Conjunctivae are normal.   Neck: Normal range of motion. Neck supple.   Cardiovascular: Normal rate and regular rhythm.   No murmur heard.  Pulmonary/Chest: Effort normal and breath sounds normal. He has no wheezes. He has no rales.   Abdominal: Soft. Bowel sounds are normal.   Musculoskeletal: Normal range of motion. He exhibits no edema.   Neurological: He is alert and oriented to person, place, and time.   Nursing note and vitals reviewed.      Assessment/Plan   Problem List Items Addressed This Visit        Cardiovascular and Mediastinum    HTN (hypertension) - Primary      Other Visit Diagnoses     Hypogonadism male          I note that the patient's blood pressure is still elevated.  He " has been placed on a dose of testosterone taking 0.5 mL twice a week.  I believe he is been overdosed on testosterone.  We will decrease his dosage and increase his carvedilol.  We will check his hemoglobin to see if he has become polycythemic.        Drink plenty fluids.    Increase Carvedilol to 25 mg twice a day. #180+1.    Decrease Testosterone to 0.5 ML once a week.    Continue other medications as doing.    Check a CBC,CMP and Testosterone.    Follow up in 7 days.                Scribed for Dr Obinna Toney by Rhea Merrill CMA.          I, Obinna Toney MD, personally performed the services described in this documentation, as scribed by Rhea Merrill in my presence, and is both accurate and complete.        (Please note that portions of this note were completed with a voice recognition program. Efforts were made to edit the dictations,but occasionally words are mis transcribed.)

## 2020-01-22 ENCOUNTER — TELEPHONE (OUTPATIENT)
Dept: FAMILY MEDICINE CLINIC | Facility: CLINIC | Age: 60
End: 2020-01-22

## 2020-01-22 NOTE — TELEPHONE ENCOUNTER
Patient called stating his hematocrit levels are high and he would like a note / referral stating that he can give blood at the blood center. Patient would have to have note from doctor before he is able to give blood. Patient stated he can  the note at front office when available. Any questions or concerns please give a call at [phone]

## 2020-01-24 LAB
TESTOST FREE SERPL-MCNC: 33.6 PG/ML (ref 7.2–24)
TESTOST SERPL-MCNC: 1346 NG/DL (ref 264–916)

## 2020-02-06 DIAGNOSIS — N52.9 ERECTILE DYSFUNCTION, UNSPECIFIED ERECTILE DYSFUNCTION TYPE: ICD-10-CM

## 2020-02-06 RX ORDER — SILDENAFIL CITRATE 20 MG/1
TABLET ORAL
Qty: 120 TABLET | Refills: 3 | Status: SHIPPED | OUTPATIENT
Start: 2020-02-06 | End: 2022-11-10 | Stop reason: SDUPTHER

## 2020-02-17 ENCOUNTER — TELEPHONE (OUTPATIENT)
Dept: FAMILY MEDICINE CLINIC | Facility: CLINIC | Age: 60
End: 2020-02-17

## 2020-02-17 NOTE — TELEPHONE ENCOUNTER
Patient is calling about a form that he dropped off on Thursday and would like to know if it has been faxed back. Please advise and call back.

## 2020-03-17 RX ORDER — CARVEDILOL 12.5 MG/1
TABLET ORAL
Qty: 60 TABLET | Refills: 3 | Status: SHIPPED | OUTPATIENT
Start: 2020-03-17 | End: 2020-04-23 | Stop reason: DRUGHIGH

## 2020-04-23 ENCOUNTER — TELEMEDICINE (OUTPATIENT)
Dept: FAMILY MEDICINE CLINIC | Facility: CLINIC | Age: 60
End: 2020-04-23

## 2020-04-23 VITALS — SYSTOLIC BLOOD PRESSURE: 140 MMHG | DIASTOLIC BLOOD PRESSURE: 90 MMHG

## 2020-04-23 DIAGNOSIS — I10 ESSENTIAL HYPERTENSION: Primary | ICD-10-CM

## 2020-04-23 PROCEDURE — 99213 OFFICE O/P EST LOW 20 MIN: CPT | Performed by: FAMILY MEDICINE

## 2020-04-23 NOTE — PROGRESS NOTES
Subjective   Randy Asif is a 60 y.o. male seen today for Hypertension.     You have chosen to receive care through a telephone visit. Do you consent to use a telephone visit for your medical care today? Yes    Attempts were made to conduct a video visit.  The patient however was unable to make a connection through my chart.  As a consequence the visit was converted to a telephone visit.  The patient was at his home and I was here at the office at [facility].  The quality of the audio connection was very good.    The patient is calling in regards to a follow-up on his hypertension.  We last saw him in January at which time his blood pressure was quite elevated at 160/110.  He was noted to be polycythemic with a hemoglobin over 18.  He had been on testosterone therapy at an excessive dosage.  I advised him that his testosterone therapy was causing a secondary polycythemia.  The sepsis currently disc due to the testosterone supplementation in the month of January 2020.    The patient has been donating a unit of blood each month.  He states that his hemoglobin was is down to a level when he last went to the blood center such as they were unable to remove blood at that time.  He is been off of testosterone as stated above since January.    He has been monitoring his blood pressure at home.  He says usually it is running in the 130s systolic over the 80s diastolic.  He forgot to his medication last night and this morning his blood pressure at home is 140/90.  He is having no chest pain or shortness of breath.  He is walking about 4-1/2 miles a day and sometimes as much as 7 miles a day.  His weight is down slightly.  He does not need any refills.       The following portions of the patient's history were reviewed and updated as appropriate: allergies, current medications, past social history and problem list.    Review of Systems   Constitutional: Negative for activity change, appetite change, chills, diaphoresis,  fatigue, fever and unexpected weight change.   HENT: Negative for congestion.    Eyes: Negative for visual disturbance.   Respiratory: Negative for shortness of breath.    Cardiovascular: Negative for chest pain, palpitations and leg swelling.   Gastrointestinal: Negative for nausea.   Genitourinary: Negative for difficulty urinating.   Musculoskeletal: Negative for arthralgias.   Neurological: Negative for seizures.       Objective   /90   Physical Exam   Constitutional: He is oriented to person, place, and time.   No physical examination could be conducted.   Neurological: He is alert and oriented to person, place, and time.       Assessment/Plan   Problem List Items Addressed This Visit        Cardiovascular and Mediastinum    HTN (hypertension) - Primary      The patient appears to be doing well.  I recommended that he continue on his current regimen of medication including losartan 100 mg a day and carvedilol 25 mg twice a day.  Continue with his daily exercise and improve diet.  He is now off of testosterone therapy and as result his polycythemia has resolved.  This undoubtedly is helped him with his blood pressure control as well.  I recommended that he return to see us in about 3 months.    10 minutes were spent with the patient on the telephone.  An additional 10 minutes was spent in completing his record.

## 2020-05-04 DIAGNOSIS — I10 ESSENTIAL HYPERTENSION: ICD-10-CM

## 2020-05-04 RX ORDER — LISINOPRIL 20 MG/1
TABLET ORAL
Qty: 90 TABLET | Refills: 2 | OUTPATIENT
Start: 2020-05-04

## 2020-07-18 DIAGNOSIS — I10 ESSENTIAL HYPERTENSION: ICD-10-CM

## 2020-07-18 RX ORDER — CARVEDILOL 25 MG/1
TABLET ORAL
Qty: 180 TABLET | Refills: 0 | Status: SHIPPED | OUTPATIENT
Start: 2020-07-18 | End: 2020-10-16

## 2020-09-03 ENCOUNTER — OFFICE VISIT (OUTPATIENT)
Dept: FAMILY MEDICINE CLINIC | Facility: CLINIC | Age: 60
End: 2020-09-03

## 2020-09-03 ENCOUNTER — TELEPHONE (OUTPATIENT)
Dept: FAMILY MEDICINE CLINIC | Facility: CLINIC | Age: 60
End: 2020-09-03

## 2020-09-03 VITALS
BODY MASS INDEX: 27.64 KG/M2 | DIASTOLIC BLOOD PRESSURE: 82 MMHG | TEMPERATURE: 98.4 F | HEART RATE: 70 BPM | OXYGEN SATURATION: 96 % | HEIGHT: 76 IN | SYSTOLIC BLOOD PRESSURE: 144 MMHG | RESPIRATION RATE: 20 BRPM | WEIGHT: 227 LBS

## 2020-09-03 DIAGNOSIS — R30.0 DYSURIA: Primary | ICD-10-CM

## 2020-09-03 LAB
BILIRUB BLD-MCNC: NEGATIVE MG/DL
CLARITY, POC: CLEAR
COLOR UR: YELLOW
EXPIRATION DATE: NORMAL
GLUCOSE UR STRIP-MCNC: NEGATIVE MG/DL
KETONES UR QL: NEGATIVE
LEUKOCYTE EST, POC: NEGATIVE
Lab: NORMAL
NITRITE UR-MCNC: NEGATIVE MG/ML
PH UR: 7 [PH] (ref 5–8)
PROT UR STRIP-MCNC: NEGATIVE MG/DL
RBC # UR STRIP: NEGATIVE /UL
SP GR UR: 1.01 (ref 1–1.03)
UROBILINOGEN UR QL: NORMAL

## 2020-09-03 PROCEDURE — 81003 URINALYSIS AUTO W/O SCOPE: CPT | Performed by: FAMILY MEDICINE

## 2020-09-03 PROCEDURE — 99213 OFFICE O/P EST LOW 20 MIN: CPT | Performed by: FAMILY MEDICINE

## 2020-09-03 NOTE — PROGRESS NOTES
"Subjective   Randy JAILENE Asif is a 60 y.o. male seen today for Back Pain.     History of Present Illness   The patient is here today with c/o mid back pain.    States he has some urinary burning,mid back pain and strong odor to his urine.  Denies any fever or chills.  Denies any chest pain or shortness of breath.    The following portions of the patient's history were reviewed and updated as appropriate: allergies, current medications, past social history and problem list.    Review of Systems   Constitutional: Negative for chills and fever.   Respiratory: Negative for shortness of breath.    Cardiovascular: Negative for chest pain.   Genitourinary: Positive for dysuria.       Objective   /82   Pulse 70   Temp 98.4 °F (36.9 °C)   Resp 20   Ht 193 cm (76\")   Wt 103 kg (227 lb)   SpO2 96%   BMI 27.63 kg/m²   Physical Exam   Constitutional: He appears well-developed and well-nourished.   Cardiovascular: Normal rate and regular rhythm.   Pulmonary/Chest: Effort normal and breath sounds normal.   Genitourinary: Rectum normal, prostate normal and penis normal.   Musculoskeletal: Normal range of motion.   Nursing note and vitals reviewed.      Assessment/Plan   Problem List Items Addressed This Visit     None      Visit Diagnoses     Dysuria    -  Primary    Relevant Orders    POCT urinalysis dipstick, automated (Completed)      Dysuria probably related to recent dietary changes.  His symptoms are better today.        Drink plenty fluids.  Avoid caffeine.    Continue medications as doing.    Follow up in        Scribed for Dr Obinna Toney by Rhea Merrill CMA.          I, Obinna Toney MD, personally performed the services described in this documentation, as scribed by Rhea Merrill in my presence, and is both accurate and complete.        (Please note that portions of this note were completed with a voice recognition program. Efforts were made to edit the dictations,but occasionally words are mis " transcribed.)

## 2020-09-03 NOTE — TELEPHONE ENCOUNTER
DELETE AFTER REVIEWING: Telephone encounter to be sent to the clinical pool     Caller: Randy Asif    Relationship: Self    Best call back number: [phone]    What medication are you requesting:    What are your current symptoms:kidney discomfort, painful urination and urine has an odor to it    How long have you been experiencing symptoms: 10-14 days    Have you had these symptoms before:    [x] Yes  [] No    Have you been treated for these symptoms before:   [x] Yes  [] No    If a prescription is needed, what is your preferred pharmacy and phone number:    [facility], [address], [place], [phone]     Additional notes:

## 2020-10-16 DIAGNOSIS — I10 ESSENTIAL HYPERTENSION: ICD-10-CM

## 2020-10-16 RX ORDER — CARVEDILOL 25 MG/1
TABLET ORAL
Qty: 180 TABLET | Refills: 1 | Status: SHIPPED | OUTPATIENT
Start: 2020-10-16 | End: 2021-04-13

## 2020-10-19 RX ORDER — LOSARTAN POTASSIUM 100 MG/1
TABLET ORAL
Qty: 90 TABLET | Refills: 1 | Status: SHIPPED | OUTPATIENT
Start: 2020-10-19 | End: 2020-11-05

## 2020-10-30 ENCOUNTER — OFFICE VISIT (OUTPATIENT)
Dept: FAMILY MEDICINE CLINIC | Facility: CLINIC | Age: 60
End: 2020-10-30

## 2020-10-30 VITALS
DIASTOLIC BLOOD PRESSURE: 100 MMHG | SYSTOLIC BLOOD PRESSURE: 160 MMHG | HEART RATE: 67 BPM | BODY MASS INDEX: 27.86 KG/M2 | WEIGHT: 228.8 LBS | HEIGHT: 76 IN | TEMPERATURE: 97.5 F | OXYGEN SATURATION: 99 %

## 2020-10-30 DIAGNOSIS — I10 UNCONTROLLED HYPERTENSION: Primary | ICD-10-CM

## 2020-10-30 PROBLEM — F41.9 ANXIETY DISORDER: Status: RESOLVED | Noted: 2017-03-09 | Resolved: 2020-10-30

## 2020-10-30 PROBLEM — F32.A DEPRESSION: Status: RESOLVED | Noted: 2017-03-09 | Resolved: 2020-10-30

## 2020-10-30 PROCEDURE — 96372 THER/PROPH/DIAG INJ SC/IM: CPT | Performed by: NURSE PRACTITIONER

## 2020-10-30 PROCEDURE — 93000 ELECTROCARDIOGRAM COMPLETE: CPT | Performed by: NURSE PRACTITIONER

## 2020-10-30 PROCEDURE — 99215 OFFICE O/P EST HI 40 MIN: CPT | Performed by: NURSE PRACTITIONER

## 2020-10-30 RX ORDER — INDAPAMIDE 2.5 MG/1
2.5 TABLET, FILM COATED ORAL EVERY MORNING
Qty: 30 TABLET | Refills: 1 | Status: SHIPPED | OUTPATIENT
Start: 2020-10-30 | End: 2020-12-11

## 2020-10-30 RX ORDER — CLONIDINE HYDROCHLORIDE 0.1 MG/1
0.1 TABLET ORAL ONCE
Status: COMPLETED | OUTPATIENT
Start: 2020-10-30 | End: 2020-10-30

## 2020-10-30 RX ORDER — FUROSEMIDE 10 MG/ML
40 INJECTION INTRAMUSCULAR; INTRAVENOUS ONCE
Status: COMPLETED | OUTPATIENT
Start: 2020-10-30 | End: 2020-10-30

## 2020-10-30 RX ADMIN — CLONIDINE HYDROCHLORIDE 0.1 MG: 0.1 TABLET ORAL at 13:16

## 2020-10-30 RX ADMIN — FUROSEMIDE 40 MG: 10 INJECTION INTRAMUSCULAR; INTRAVENOUS at 13:19

## 2020-10-30 NOTE — PATIENT INSTRUCTIONS
"Managing Your Hypertension  Hypertension is commonly called high blood pressure. This is when the force of your blood pressing against the walls of your arteries is too strong. Arteries are blood vessels that carry blood from your heart throughout your body. Hypertension forces the heart to work harder to pump blood, and may cause the arteries to become narrow or stiff. Having untreated or uncontrolled hypertension can cause heart attack, stroke, kidney disease, and other problems.  What are blood pressure readings?  A blood pressure reading consists of a higher number over a lower number. Ideally, your blood pressure should be below 120/80. The first (\"top\") number is called the systolic pressure. It is a measure of the pressure in your arteries as your heart beats. The second (\"bottom\") number is called the diastolic pressure. It is a measure of the pressure in your arteries as the heart relaxes.  What does my blood pressure reading mean?  Blood pressure is classified into four stages. Based on your blood pressure reading, your health care provider may use the following stages to determine what type of treatment you need, if any. Systolic pressure and diastolic pressure are measured in a unit called mm Hg.  Normal  · Systolic pressure: below 120.  · Diastolic pressure: below 80.  Elevated  · Systolic pressure: 120-129.  · Diastolic pressure: below 80.  Hypertension stage 1  · Systolic pressure: 130-139.  · Diastolic pressure: 80-89.  Hypertension stage 2  · Systolic pressure: 140 or above.  · Diastolic pressure: 90 or above.  What health risks are associated with hypertension?  Managing your hypertension is an important responsibility. Uncontrolled hypertension can lead to:  · A heart attack.  · A stroke.  · A weakened blood vessel (aneurysm).  · Heart failure.  · Kidney damage.  · Eye damage.  · Metabolic syndrome.  · Memory and concentration problems.  What changes can I make to manage my " hypertension?  Hypertension can be managed by making lifestyle changes and possibly by taking medicines. Your health care provider will help you make a plan to bring your blood pressure within a normal range.  Eating and drinking    · Eat a diet that is high in fiber and potassium, and low in salt (sodium), added sugar, and fat. An example eating plan is called the DASH (Dietary Approaches to Stop Hypertension) diet. To eat this way:  ? Eat plenty of fresh fruits and vegetables. Try to fill half of your plate at each meal with fruits and vegetables.  ? Eat whole grains, such as whole wheat pasta, brown rice, or whole grain bread. Fill about one quarter of your plate with whole grains.  ? Eat low-fat diary products.  ? Avoid fatty cuts of meat, processed or cured meats, and poultry with skin. Fill about one quarter of your plate with lean proteins such as fish, chicken without skin, beans, eggs, and tofu.  ? Avoid premade and processed foods. These tend to be higher in sodium, added sugar, and fat.  · Reduce your daily sodium intake. Most people with hypertension should eat less than 1,500 mg of sodium a day.  · Limit alcohol intake to no more than 1 drink a day for nonpregnant women and 2 drinks a day for men. One drink equals 12 oz of beer, 5 oz of wine, or 1½ oz of hard liquor.  Lifestyle  · Work with your health care provider to maintain a healthy body weight, or to lose weight. Ask what an ideal weight is for you.  · Get at least 30 minutes of exercise that causes your heart to beat faster (aerobic exercise) most days of the week. Activities may include walking, swimming, or biking.  · Include exercise to strengthen your muscles (resistance exercise), such as weight lifting, as part of your weekly exercise routine. Try to do these types of exercises for 30 minutes at least 3 days a week.  · Do not use any products that contain nicotine or tobacco, such as cigarettes and e-cigarettes. If you need help quitting,  ask your health care provider.  · Control any long-term (chronic) conditions you have, such as high cholesterol or diabetes.  Monitoring  · Monitor your blood pressure at home as told by your health care provider. Your personal target blood pressure may vary depending on your medical conditions, your age, and other factors.  · Have your blood pressure checked regularly, as often as told by your health care provider.  Working with your health care provider  · Review all the medicines you take with your health care provider because there may be side effects or interactions.  · Talk with your health care provider about your diet, exercise habits, and other lifestyle factors that may be contributing to hypertension.  · Visit your health care provider regularly. Your health care provider can help you create and adjust your plan for managing hypertension.  Will I need medicine to control my blood pressure?  Your health care provider may prescribe medicine if lifestyle changes are not enough to get your blood pressure under control, and if:  · Your systolic blood pressure is 130 or higher.  · Your diastolic blood pressure is 80 or higher.  Take medicines only as told by your health care provider. Follow the directions carefully. Blood pressure medicines must be taken as prescribed. The medicine does not work as well when you skip doses. Skipping doses also puts you at risk for problems.  Contact a health care provider if:  · You think you are having a reaction to medicines you have taken.  · You have repeated (recurrent) headaches.  · You feel dizzy.  · You have swelling in your ankles.  · You have trouble with your vision.  Get help right away if:  · You develop a severe headache or confusion.  · You have unusual weakness or numbness, or you feel faint.  · You have severe pain in your chest or abdomen.  · You vomit repeatedly.  · You have trouble breathing.  Summary  · Hypertension is when the force of blood pumping  through your arteries is too strong. If this condition is not controlled, it may put you at risk for serious complications.  · Your personal target blood pressure may vary depending on your medical conditions, your age, and other factors. For most people, a normal blood pressure is less than 120/80.  · Hypertension is managed by lifestyle changes, medicines, or both. Lifestyle changes include weight loss, eating a healthy, low-sodium diet, exercising more, and limiting alcohol.  This information is not intended to replace advice given to you by your health care provider. Make sure you discuss any questions you have with your health care provider.  Document Released: 09/11/2013 Document Revised: 04/10/2020 Document Reviewed: 11/15/2017  [employer] Patient Education © 2020 [employer]  Indapamide tablets  What is this medicine?  INDAPAMIDE (in DAP a mide) is a diuretic. It helps you make more urine and to lose salt and excess water from your body. This medicine is used to treat high blood pressure. It is also used to treat salt and water retention from heart failure.  This medicine may be used for other purposes; ask your health care provider or pharmacist if you have questions.  COMMON BRAND NAME(S): Lozol  What should I tell my health care provider before I take this medicine?  They need to know if you have any of these conditions:  · diabetes  · gout  · kidney disease  · history of irregular heart beats  · liver disease  · pancreatitis  · small amount of urine or difficulty passing urine  · systemic lupus erythematosus  · an unusual or allergic reaction to indapamide, sulfa drugs, other medicines, foods, dyes, or preservatives  · pregnant or trying to get pregnant  · breast-feeding  How should I use this medicine?  Take this medicine by mouth with a glass of water. Follow the directions on the prescription label. Do not take your medicine more often than directed. Remember that you will need to pass more urine  after taking this medicine. Do not take your medicine at a time of day that will cause you problems. Do not take at bedtime. Take your medicine at regular intervals. Do not take your medicine more often than directed. Do not stop taking except on your doctor's advice.  Talk to your pediatrician regarding the use of this medicine in children. Special care may be needed.  Overdosage: If you think you have taken too much of this medicine contact a poison control center or emergency room at once.  NOTE: This medicine is only for you. Do not share this medicine with others.  What if I miss a dose?  If you miss a dose, take it as soon as you can. If it is almost time for your next dose, take only that dose. Do not take double or extra doses.  What may interact with this medicine?  · heart medicines like digoxin  · lithium  · other medicines for high blood pressure  This list may not describe all possible interactions. Give your health care provider a list of all the medicines, herbs, non-prescription drugs, or dietary supplements you use. Also tell them if you smoke, drink alcohol, or use illegal drugs. Some items may interact with your medicine.  What should I watch for while using this medicine?  Visit your doctor or health care professional for regular checks on your progress. Check your blood pressure as directed. Ask your doctor or health care professional what your blood pressure should be and when you should contact him or her.  You may need to be on a special diet while taking this medicine. Ask your doctor.  Check with your doctor or health care professional if you get an attack of severe diarrhea, nausea and vomiting, or if you sweat a lot. The loss of too much body fluid can make it dangerous for you to take this medicine.  You may get drowsy or dizzy. Do not drive, use machinery, or do anything that needs mental alertness until you know how this medicine affects you. Do not stand or sit up quickly, especially  if you are an older patient. This reduces the risk of dizzy or fainting spells. Alcohol may interfere with the effect of this medicine. Avoid alcoholic drinks.  This medicine may affect your blood sugar level. If you have diabetes, check with your doctor or health care professional before changing the dose of your diabetic medicine.  This medicine can make you more sensitive to the sun. Keep out of the sun. If you cannot avoid being in the sun, wear protective clothing and use sunscreen. Do not use sun lamps or tanning beds/booths.  What side effects may I notice from receiving this medicine?  Side effects that you should report to your doctor or health care professional as soon as possible:  · allergic reactions like skin rash, itching or hives, swelling of the face, lips, or tongue  · anxious or restless  · blurred vision  · dry mouth  · infection or flu like symptoms  · irregular heartbeat  · muscle cramps or spasm  · redness, blistering, peeling or loosening of the skin, including inside the mouth  · unusually weak or tired  · vomiting  Side effects that usually do not require medical attention (report to your doctor or health care professional if they continue or are bothersome):  · diarrhea  · headache  · loss of appetite  · nausea, stomach upset  · pain  This list may not describe all possible side effects. Call your doctor for medical advice about side effects. You may report side effects to FDA at 3-598-FDA-3626.  Where should I keep my medicine?  Keep out of the reach of children.  Store at room temperature between 15 and 30 degrees C (59 and 86 degrees F). Keep container tightly closed. Throw away any unused medicine after the expiration date.  NOTE: This sheet is a summary. It may not cover all possible information. If you have questions about this medicine, talk to your doctor, pharmacist, or health care provider.  © 2020 [employer] (2009-05-05 16:55:01)

## 2020-10-30 NOTE — PROGRESS NOTES
Subjective   Randy Asif is a 60 y.o. male.     Mr. Chang presents today with complaint of elevated blood pressure reading at home this morning.  He states that he checked his blood pressure and it was 162/105.  He denies chest pains, shortness of breath, headaches weakness, dizziness or other concerning symptoms.  He states that he feels well, but is concerned about his blood pressure.  He states that he has been under a lot of stress lately, that his mother fell and broke her hip and had a head injury which resulted in a brain bleed.  He states that she is hospitalized in [place] where his sister lives.  Hypertension  This is a chronic problem. The current episode started more than 1 year ago. The problem is uncontrolled. Associated symptoms include anxiety. Pertinent negatives include no blurred vision, chest pain, headaches, malaise/fatigue, neck pain, orthopnea, palpitations, peripheral edema, PND, shortness of breath or sweats. There are no associated agents to hypertension. Risk factors for coronary artery disease include male gender, stress and family history. Current antihypertension treatment includes angiotensin blockers and beta blockers. The current treatment provides moderate improvement. There are no compliance problems.        The following portions of the patient's history were reviewed and updated as appropriate: allergies, current medications, past family history, past medical history, past social history, past surgical history and problem list.    Allergies as of 10/30/2020 - Reviewed 10/30/2020   Allergen Reaction Noted   • Indapamide Myalgia 09/13/2017   • Lisinopril Cough 09/19/2019   • Oxycodone-acetaminophen Anxiety 10/01/2015   • Percocet [oxycodone-acetaminophen] Anxiety 03/09/2017       Current Outpatient Medications on File Prior to Visit   Medication Sig   • carvedilol (COREG) 25 MG tablet TAKE ONE TABLET BY MOUTH TWICE A DAY WITH MEALS   • lamoTRIgine (LaMICtal) 100 MG tablet  Take 200 mg by mouth 2 (Two) Times a Day.   • losartan (COZAAR) 100 MG tablet TAKE ONE TABLET BY MOUTH DAILY   • sildenafil (REVATIO) 20 MG tablet TAKE FIVE TABLETS BY MOUTH DAILY AS NEEDED FOR ERECTILE DYSFUNCTION     No current facility-administered medications on file prior to visit.        Review of Systems   Constitutional: Negative.  Negative for malaise/fatigue.   HENT: Negative.    Eyes: Negative for blurred vision.   Respiratory: Negative.  Negative for shortness of breath.    Cardiovascular: Negative.  Negative for chest pain, palpitations, orthopnea and PND.   Gastrointestinal: Negative.    Genitourinary: Negative.    Musculoskeletal: Negative.  Negative for neck pain.   Neurological: Negative.  Negative for headaches.   Psychiatric/Behavioral: The patient is nervous/anxious.         Patient states he has been under a lot of stress recently, and has been anxious.  He states that he has been drinking alcohol to help with his symptoms.       Objective   Physical Exam  Vitals signs reviewed.   Constitutional:       General: He is not in acute distress.     Appearance: Normal appearance. He is well-developed and well-groomed. He is not ill-appearing, toxic-appearing or diaphoretic.   Neck:      Musculoskeletal: Normal range of motion and neck supple.      Thyroid: No thyromegaly.   Cardiovascular:      Rate and Rhythm: Normal rate and regular rhythm.      Heart sounds: Normal heart sounds, S1 normal and S2 normal. No murmur.   Pulmonary:      Effort: Pulmonary effort is normal.      Breath sounds: Normal breath sounds.   Musculoskeletal:      Right lower leg: No edema.      Left lower leg: No edema.   Lymphadenopathy:      Cervical: No cervical adenopathy.   Skin:     General: Skin is warm and dry.      Findings: No rash.   Neurological:      General: No focal deficit present.      Mental Status: He is alert and oriented to person, place, and time.   Psychiatric:         Behavior: Behavior normal. Behavior is  cooperative.         Thought Content: Thought content normal.         Judgment: Judgment normal.       Vitals:    10/30/20 1130   BP: 160/100   Pulse: 67   Temp: 97.5 °F (36.4 °C)   SpO2: 99%     Body mass index is 27.85 kg/m².    Assessment/Plan   Diagnoses and all orders for this visit:    1. Uncontrolled hypertension (Primary)  -     ECG 12 Lead  -     furosemide (LASIX) injection 40 mg  -     cloNIDine (CATAPRES) tablet 0.1 mg  -     indapamide (LOZOL) 2.5 MG tablet; Take 1 tablet by mouth Every Morning.  Dispense: 30 tablet; Refill: 1     *Patient's blood pressure was rechecked by me with the result of 178/110.  I consulted with Dr. Toney patient's PCP and per his recommendation I administered 40 mg of Lasix to patient intramuscularly.  Patient tolerated injections well and and urinated multiple times subsequently.  Blood pressure was rechecked after 30 minutes and had increased to 210/130.  An EKG was performed and reviewed by me and Dr. Toney.  Patient was subsequently administered 0.1 mg of clonidine in office and blood pressure was monitored every 30 minutes for the next hour.  Patient's blood pressure after 1 hour was 170/110.  Patient was seen and examined by Dr. Toney.  Patient remained under observation and blood pressure was monitored for the next hour at which time patient's blood pressure decreased to 148/90.  Patient was allowed to return home and was advised to begin indapamide 2.5 mg daily.      ECG 12 Lead    Date/Time: 10/30/2020 1:13 PM  Performed by: Navya Mora APRN  Authorized by: Navya Mora APRN   Comparison: compared with previous ECG from 1/20/2020  Similar to previous ECG  Comparison to previous ECG: Tachycardia resolved.   Rhythm: sinus rhythm  Ectopy: infrequent PVCs  Rate: normal  BPM: 66  Conduction: conduction normal  Other: no other findings    Clinical impression: abnormal EKG          Discussed the nature of the medical condition(s) risks, complications,  implications, management, safe and proper use of medications. Encouraged medication compliance, and keeping scheduled follow up appointments with me and any other providers.   Patient was present in the office and monitored for over 2 hours.  F/U with Dr. Toney in one week.

## 2020-11-05 ENCOUNTER — OFFICE VISIT (OUTPATIENT)
Dept: FAMILY MEDICINE CLINIC | Facility: CLINIC | Age: 60
End: 2020-11-05

## 2020-11-05 VITALS
HEIGHT: 76 IN | RESPIRATION RATE: 16 BRPM | WEIGHT: 226 LBS | DIASTOLIC BLOOD PRESSURE: 72 MMHG | SYSTOLIC BLOOD PRESSURE: 118 MMHG | OXYGEN SATURATION: 99 % | TEMPERATURE: 98 F | HEART RATE: 76 BPM | BODY MASS INDEX: 27.52 KG/M2

## 2020-11-05 DIAGNOSIS — I10 ESSENTIAL HYPERTENSION: Primary | ICD-10-CM

## 2020-11-05 DIAGNOSIS — N52.9 ERECTILE DYSFUNCTION, UNSPECIFIED ERECTILE DYSFUNCTION TYPE: ICD-10-CM

## 2020-11-05 PROCEDURE — 99213 OFFICE O/P EST LOW 20 MIN: CPT | Performed by: FAMILY MEDICINE

## 2020-11-05 RX ORDER — LOSARTAN POTASSIUM 100 MG/1
50 TABLET ORAL DAILY
Qty: 90 TABLET | Refills: 1
Start: 2020-11-05 | End: 2021-04-19

## 2020-11-05 NOTE — PROGRESS NOTES
"Subjective   Randy Asif is a 60 y.o. male seen today for Hypertension.     History of Present Illness   The patient is here today for a follow up on Hypertension.    States he is doing better.  Denies any chest pain or shortness of breath.    BP today is 118/72. On Monday had a BP reading of 90/55. Another low on Tuesday after walking and was 90/51. States he did not take the Indapamide that day.  Taking Carvedilol 25 mg twice a day, Indapamide 2.5 mg daily and losartan 100 mg daily.    States he is still having trouble with ED.  Taking the Sildenafil 20 mg taking 2-3 tablets as needed.    States on Sunday he did have about an ounce of alcohol and his BP spiked.    The following portions of the patient's history were reviewed and updated as appropriate: allergies, current medications, past social history and problem list.    Review of Systems   Respiratory: Negative for shortness of breath.    Cardiovascular: Negative for chest pain.   Genitourinary:        ED   Neurological: Positive for dizziness and light-headedness.       Objective   /72   Pulse 76   Temp 98 °F (36.7 °C) (Temporal)   Resp 16   Ht 193 cm (76\")   Wt 103 kg (226 lb)   SpO2 99%   BMI 27.51 kg/m²   Physical Exam  Vitals signs and nursing note reviewed.   Constitutional:       Appearance: Normal appearance.   Neck:      Musculoskeletal: Normal range of motion and neck supple.   Cardiovascular:      Rate and Rhythm: Normal rate and regular rhythm.   Pulmonary:      Effort: Pulmonary effort is normal.      Breath sounds: Normal breath sounds.   Neurological:      General: No focal deficit present.      Mental Status: He is alert.         Assessment/Plan   Problems Addressed this Visit        Cardiovascular and Mediastinum    HTN (hypertension) - Primary       Other    Erectile dysfunction      Diagnoses       Codes Comments    Essential hypertension    -  Primary ICD-10-CM: I10  ICD-9-CM: 401.9     Erectile dysfunction, unspecified " erectile dysfunction type     ICD-10-CM: N52.9  ICD-9-CM: 607.84               Drink plenty fluids.    Decrease the Losartan 100 mg to 1/2 tablet daily.    Continue other medications as doing.  Monitor BP as doing.  Avoid the use of alcohol.    Follow up in 2 weeks.        Scribed for Dr Obinna Toney by Rhea Merrill CMA.          I, Obinna Toney MD, personally performed the services described in this documentation, as scribed by Rhea Merrill in my presence, and is both accurate and complete.        (Please note that portions of this note were completed with a voice recognition program. Efforts were made to edit the dictations,but occasionally words are mis transcribed.)

## 2020-12-10 DIAGNOSIS — I10 UNCONTROLLED HYPERTENSION: ICD-10-CM

## 2020-12-11 RX ORDER — INDAPAMIDE 2.5 MG/1
TABLET, FILM COATED ORAL
Qty: 30 TABLET | Refills: 0 | Status: SHIPPED | OUTPATIENT
Start: 2020-12-11 | End: 2020-12-26

## 2020-12-25 DIAGNOSIS — I10 UNCONTROLLED HYPERTENSION: ICD-10-CM

## 2020-12-26 RX ORDER — INDAPAMIDE 2.5 MG/1
TABLET, FILM COATED ORAL
Qty: 30 TABLET | Refills: 5 | Status: SHIPPED | OUTPATIENT
Start: 2020-12-26 | End: 2021-06-28

## 2021-03-26 ENCOUNTER — TELEPHONE (OUTPATIENT)
Dept: FAMILY MEDICINE CLINIC | Facility: CLINIC | Age: 61
End: 2021-03-26

## 2021-03-26 DIAGNOSIS — I10 UNCONTROLLED HYPERTENSION: Primary | ICD-10-CM

## 2021-03-26 DIAGNOSIS — E78.5 HYPERLIPIDEMIA, UNSPECIFIED HYPERLIPIDEMIA TYPE: ICD-10-CM

## 2021-03-26 DIAGNOSIS — Z12.5 SCREENING FOR MALIGNANT NEOPLASM OF PROSTATE: ICD-10-CM

## 2021-03-26 NOTE — TELEPHONE ENCOUNTER
Caller: Randy Asif    Relationship: Self    Best call back number: [phone]    What orders are you requesting (i.e. lab or imaging): BLOOD WORK    In what timeframe would the patient need to come in: ANYTIME. PATIENT WOULD LIKE TO KNOW IF LABS CAN BE ORDERED BY RENATA ARIAS. PATIENT STATES HE WILL MAKE AN APPOINTMENT WITH HER TO FOLLOW UP.    Where will you receive your lab/imaging services: [facility]    Additional notes:

## 2021-04-13 DIAGNOSIS — I10 ESSENTIAL HYPERTENSION: ICD-10-CM

## 2021-04-13 RX ORDER — CARVEDILOL 25 MG/1
TABLET ORAL
Qty: 60 TABLET | Refills: 0 | Status: SHIPPED | OUTPATIENT
Start: 2021-04-13 | End: 2021-05-13

## 2021-04-16 ENCOUNTER — LAB (OUTPATIENT)
Dept: FAMILY MEDICINE CLINIC | Facility: CLINIC | Age: 61
End: 2021-04-16

## 2021-04-16 DIAGNOSIS — E78.5 HYPERLIPIDEMIA, UNSPECIFIED HYPERLIPIDEMIA TYPE: ICD-10-CM

## 2021-04-16 DIAGNOSIS — I10 UNCONTROLLED HYPERTENSION: ICD-10-CM

## 2021-04-16 DIAGNOSIS — Z12.5 SCREENING FOR MALIGNANT NEOPLASM OF PROSTATE: ICD-10-CM

## 2021-04-16 LAB
ALBUMIN SERPL-MCNC: 4.6 G/DL (ref 3.5–5.2)
ALBUMIN/GLOB SERPL: 2.7 G/DL
ALP SERPL-CCNC: 48 U/L (ref 39–117)
ALT SERPL W P-5'-P-CCNC: 67 U/L (ref 1–41)
ANION GAP SERPL CALCULATED.3IONS-SCNC: 11.6 MMOL/L (ref 5–15)
AST SERPL-CCNC: 38 U/L (ref 1–40)
BASOPHILS # BLD AUTO: 0.07 10*3/MM3 (ref 0–0.2)
BASOPHILS NFR BLD AUTO: 1.3 % (ref 0–1.5)
BILIRUB SERPL-MCNC: 0.4 MG/DL (ref 0–1.2)
BUN SERPL-MCNC: 13 MG/DL (ref 8–23)
BUN/CREAT SERPL: 14.8 (ref 7–25)
CALCIUM SPEC-SCNC: 9.8 MG/DL (ref 8.6–10.5)
CHLORIDE SERPL-SCNC: 96 MMOL/L (ref 98–107)
CHOLEST SERPL-MCNC: 229 MG/DL (ref 0–200)
CO2 SERPL-SCNC: 29.4 MMOL/L (ref 22–29)
CREAT SERPL-MCNC: 0.88 MG/DL (ref 0.76–1.27)
DEPRECATED RDW RBC AUTO: 42.9 FL (ref 37–54)
EOSINOPHIL # BLD AUTO: 0.22 10*3/MM3 (ref 0–0.4)
EOSINOPHIL NFR BLD AUTO: 4 % (ref 0.3–6.2)
ERYTHROCYTE [DISTWIDTH] IN BLOOD BY AUTOMATED COUNT: 13.1 % (ref 12.3–15.4)
GFR SERPL CREATININE-BSD FRML MDRD: 88 ML/MIN/1.73
GLOBULIN UR ELPH-MCNC: 1.7 GM/DL
GLUCOSE SERPL-MCNC: 111 MG/DL (ref 65–99)
HCT VFR BLD AUTO: 46 % (ref 37.5–51)
HDLC SERPL-MCNC: 49 MG/DL (ref 40–60)
HGB BLD-MCNC: 15.9 G/DL (ref 13–17.7)
IMM GRANULOCYTES # BLD AUTO: 0.06 10*3/MM3 (ref 0–0.05)
IMM GRANULOCYTES NFR BLD AUTO: 1.1 % (ref 0–0.5)
LDLC SERPL CALC-MCNC: 153 MG/DL (ref 0–100)
LDLC/HDLC SERPL: 3.06 {RATIO}
LYMPHOCYTES # BLD AUTO: 2.08 10*3/MM3 (ref 0.7–3.1)
LYMPHOCYTES NFR BLD AUTO: 37.8 % (ref 19.6–45.3)
MCH RBC QN AUTO: 31.1 PG (ref 26.6–33)
MCHC RBC AUTO-ENTMCNC: 34.6 G/DL (ref 31.5–35.7)
MCV RBC AUTO: 90 FL (ref 79–97)
MONOCYTES # BLD AUTO: 0.79 10*3/MM3 (ref 0.1–0.9)
MONOCYTES NFR BLD AUTO: 14.4 % (ref 5–12)
NEUTROPHILS NFR BLD AUTO: 2.28 10*3/MM3 (ref 1.7–7)
NEUTROPHILS NFR BLD AUTO: 41.4 % (ref 42.7–76)
NRBC BLD AUTO-RTO: 0 /100 WBC (ref 0–0.2)
PLATELET # BLD AUTO: 214 10*3/MM3 (ref 140–450)
PMV BLD AUTO: 10.2 FL (ref 6–12)
POTASSIUM SERPL-SCNC: 3.8 MMOL/L (ref 3.5–5.2)
PROT SERPL-MCNC: 6.3 G/DL (ref 6–8.5)
PSA SERPL-MCNC: 0.81 NG/ML (ref 0–4)
RBC # BLD AUTO: 5.11 10*6/MM3 (ref 4.14–5.8)
SODIUM SERPL-SCNC: 137 MMOL/L (ref 136–145)
TRIGL SERPL-MCNC: 151 MG/DL (ref 0–150)
TSH SERPL DL<=0.05 MIU/L-ACNC: 0.84 UIU/ML (ref 0.27–4.2)
VLDLC SERPL-MCNC: 27 MG/DL (ref 5–40)
WBC # BLD AUTO: 5.5 10*3/MM3 (ref 3.4–10.8)

## 2021-04-16 PROCEDURE — 84443 ASSAY THYROID STIM HORMONE: CPT | Performed by: NURSE PRACTITIONER

## 2021-04-16 PROCEDURE — 80061 LIPID PANEL: CPT | Performed by: NURSE PRACTITIONER

## 2021-04-16 PROCEDURE — 85025 COMPLETE CBC W/AUTO DIFF WBC: CPT | Performed by: NURSE PRACTITIONER

## 2021-04-16 PROCEDURE — 80053 COMPREHEN METABOLIC PANEL: CPT | Performed by: NURSE PRACTITIONER

## 2021-04-16 PROCEDURE — G0103 PSA SCREENING: HCPCS | Performed by: NURSE PRACTITIONER

## 2021-04-19 RX ORDER — LOSARTAN POTASSIUM 100 MG/1
TABLET ORAL
Qty: 90 TABLET | Refills: 0 | Status: SHIPPED | OUTPATIENT
Start: 2021-04-19 | End: 2021-07-19

## 2021-05-13 DIAGNOSIS — I10 ESSENTIAL HYPERTENSION: ICD-10-CM

## 2021-05-13 RX ORDER — CARVEDILOL 25 MG/1
TABLET ORAL
Qty: 60 TABLET | Refills: 0 | Status: SHIPPED | OUTPATIENT
Start: 2021-05-13 | End: 2021-06-15 | Stop reason: SDUPTHER

## 2021-06-15 DIAGNOSIS — I10 ESSENTIAL HYPERTENSION: ICD-10-CM

## 2021-06-21 RX ORDER — CARVEDILOL 25 MG/1
25 TABLET ORAL 2 TIMES DAILY WITH MEALS
Qty: 60 TABLET | Refills: 0 | Status: SHIPPED | OUTPATIENT
Start: 2021-06-21 | End: 2021-07-20 | Stop reason: SDUPTHER

## 2021-06-28 DIAGNOSIS — I10 UNCONTROLLED HYPERTENSION: ICD-10-CM

## 2021-06-28 RX ORDER — INDAPAMIDE 2.5 MG/1
TABLET, FILM COATED ORAL
Qty: 30 TABLET | Refills: 0 | Status: SHIPPED | OUTPATIENT
Start: 2021-06-28 | End: 2021-07-30 | Stop reason: SDUPTHER

## 2021-07-19 RX ORDER — LOSARTAN POTASSIUM 100 MG/1
TABLET ORAL
Qty: 90 TABLET | Refills: 0 | Status: SHIPPED | OUTPATIENT
Start: 2021-07-19 | End: 2021-10-20

## 2021-07-20 DIAGNOSIS — I10 ESSENTIAL HYPERTENSION: ICD-10-CM

## 2021-07-20 RX ORDER — CARVEDILOL 25 MG/1
25 TABLET ORAL 2 TIMES DAILY WITH MEALS
Qty: 60 TABLET | Refills: 3 | Status: SHIPPED | OUTPATIENT
Start: 2021-07-20 | End: 2021-11-19

## 2021-07-30 DIAGNOSIS — I10 UNCONTROLLED HYPERTENSION: ICD-10-CM

## 2021-07-30 RX ORDER — INDAPAMIDE 2.5 MG/1
2.5 TABLET, FILM COATED ORAL EVERY MORNING
Qty: 30 TABLET | Refills: 2 | Status: SHIPPED | OUTPATIENT
Start: 2021-07-30 | End: 2022-01-06 | Stop reason: SDUPTHER

## 2021-08-27 PROCEDURE — U0004 COV-19 TEST NON-CDC HGH THRU: HCPCS | Performed by: FAMILY MEDICINE

## 2021-10-20 RX ORDER — LOSARTAN POTASSIUM 100 MG/1
TABLET ORAL
Qty: 90 TABLET | Refills: 0 | Status: SHIPPED | OUTPATIENT
Start: 2021-10-20 | End: 2022-01-17

## 2021-10-29 ENCOUNTER — OFFICE VISIT (OUTPATIENT)
Dept: FAMILY MEDICINE CLINIC | Facility: CLINIC | Age: 61
End: 2021-10-29

## 2021-10-29 VITALS
OXYGEN SATURATION: 98 % | HEIGHT: 76 IN | DIASTOLIC BLOOD PRESSURE: 82 MMHG | SYSTOLIC BLOOD PRESSURE: 132 MMHG | WEIGHT: 230 LBS | TEMPERATURE: 97 F | RESPIRATION RATE: 20 BRPM | HEART RATE: 78 BPM | BODY MASS INDEX: 28.01 KG/M2

## 2021-10-29 DIAGNOSIS — Z00.00 ANNUAL PHYSICAL EXAM: Primary | ICD-10-CM

## 2021-10-29 DIAGNOSIS — F10.20 ALCOHOL DEPENDENCE, DAILY USE (HCC): ICD-10-CM

## 2021-10-29 DIAGNOSIS — Z12.11 SCREENING FOR MALIGNANT NEOPLASM OF COLON: ICD-10-CM

## 2021-10-29 DIAGNOSIS — Z11.59 NEED FOR HEPATITIS C SCREENING TEST: ICD-10-CM

## 2021-10-29 DIAGNOSIS — I10 PRIMARY HYPERTENSION: Chronic | ICD-10-CM

## 2021-10-29 PROCEDURE — 99396 PREV VISIT EST AGE 40-64: CPT | Performed by: NURSE PRACTITIONER

## 2021-10-29 PROCEDURE — 99214 OFFICE O/P EST MOD 30 MIN: CPT | Performed by: NURSE PRACTITIONER

## 2021-10-29 NOTE — PROGRESS NOTES
"     Follow Up Office Note     Patient Name: Randy Asif  : 1960   MRN: [id number]     Chief Complaint:    Chief Complaint   Patient presents with   • Annual Exam       History of Present Illness: Randy Asif is a 61 y.o. male who presents today for annual physical exam. Patient describes his overall health as good. He has no complaints today. He would however like to discuss medication to help him with his alcohol cravings as he feels that he drinks more than he should and would like to stop drinking. He describes himself as a \"functional alcoholic\".   Patient has chronic HTN which has been stable and controlled on current medications.  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 Patient reports that his condition has remained stable and well-controlled and states his last seizure was over 4 years ago.    Alcohol Problem  This is a chronic problem. The current episode started more than 1 year ago. The problem occurs daily (Patient reports average of 6 drinks or more most days of the week). The problem has been unchanged. Pertinent negatives include no abdominal pain, change in bowel habit, chills, diaphoresis, fatigue, fever, nausea, vomiting or weakness. The symptoms are aggravated by stress. He has tried nothing for the symptoms.      Patient is not fasting today, but is amenable to return for fasting labs at a later date.  Subjective      Review of Systems:   Review of Systems   Constitutional: Negative.  Negative for chills, diaphoresis, fatigue and fever.   HENT: Negative.         Dental exam up to date   Eyes: Negative.         Eye exam up to date   Respiratory: Negative.    Cardiovascular: Negative.    Gastrointestinal: Negative.  Negative for abdominal pain, change in bowel habit, nausea and vomiting.   Genitourinary: Negative.         BPH   Musculoskeletal: Negative.    Skin: Negative.    Allergic/Immunologic: Positive for environmental allergies. " "  Neurological: Negative.  Negative for weakness.        Last seizure 4 years ago   Psychiatric/Behavioral: Negative for dysphoric mood and sleep disturbance. The patient is not nervous/anxious.         Past Medical History:   Past Medical History:   Diagnosis Date   • Abnormal finding on MRI of brain    • Asthmatic bronchitis    • Brain cancer (HCC)    • Elevated AST (SGOT)    • Elevated glucose    • Seizures (HCC)    • Shingles          Medications:     Current Outpatient Medications:   •  carvedilol (COREG) 25 MG tablet, Take 1 tablet by mouth 2 (Two) Times a Day With Meals. Follow-up for refills, Disp: 60 tablet, Rfl: 3  •  indapamide (LOZOL) 2.5 MG tablet, Take 1 tablet by mouth Every Morning., Disp: 30 tablet, Rfl: 2  •  lamoTRIgine (LaMICtal) 100 MG tablet, Take 200 mg by mouth 2 (Two) Times a Day., Disp: , Rfl:   •  losartan (COZAAR) 100 MG tablet, TAKE ONE TABLET BY MOUTH DAILY, Disp: 90 tablet, Rfl: 0  •  sildenafil (REVATIO) 20 MG tablet, TAKE FIVE TABLETS BY MOUTH DAILY AS NEEDED FOR ERECTILE DYSFUNCTION, Disp: 120 tablet, Rfl: 3    Allergies:   Allergies   Allergen Reactions   • Indapamide Myalgia     Muscle cramps     • Lisinopril Cough   • Oxycodone-Acetaminophen Anxiety   • Percocet [Oxycodone-Acetaminophen] Anxiety     PHQ-2/PHQ-9 Depression Screening 10/29/2021   Little interest or pleasure in doing things 0   Feeling down, depressed, or hopeless 0   Total Score 0         Objective     Physical Exam:  Vital Signs:   Vitals:    10/29/21 0930   BP: 132/82   Pulse: 78   Resp: 20   Temp: 97 °F (36.1 °C)   SpO2: 98%   Weight: 104 kg (230 lb)   Height: 193 cm (76\")   PainSc: 0-No pain     Body mass index is 28 kg/m².     Physical Exam  Vitals and nursing note reviewed. Exam conducted with a chaperone present.   Constitutional:       General: He is not in acute distress.     Appearance: Normal appearance. He is well-developed and well-groomed. He is not ill-appearing, toxic-appearing or diaphoretic. "   HENT:      Head: Normocephalic and atraumatic.      Right Ear: Tympanic membrane, ear canal and external ear normal.      Left Ear: Tympanic membrane, ear canal and external ear normal.      Nose: Nose normal.      Mouth/Throat:      Lips: Pink.      Mouth: Mucous membranes are moist.      Pharynx: Oropharynx is clear. Uvula midline. No posterior oropharyngeal erythema.   Eyes:      Pupils: Pupils are equal, round, and reactive to light.   Neck:      Thyroid: No thyroid mass, thyromegaly or thyroid tenderness.   Cardiovascular:      Rate and Rhythm: Normal rate and regular rhythm.      Pulses: Normal pulses.      Heart sounds: Normal heart sounds, S1 normal and S2 normal. No murmur heard.      Pulmonary:      Effort: Pulmonary effort is normal. No respiratory distress.      Breath sounds: Normal breath sounds.   Abdominal:      General: Bowel sounds are normal. There is no distension.      Palpations: Abdomen is soft.      Tenderness: There is no abdominal tenderness.   Musculoskeletal:         General: Normal range of motion.      Cervical back: Normal range of motion and neck supple.      Right lower leg: No edema.      Left lower leg: No edema.   Lymphadenopathy:      Cervical: No cervical adenopathy.   Skin:     General: Skin is warm and dry.      Capillary Refill: Capillary refill takes less than 2 seconds.      Findings: No rash.   Neurological:      General: No focal deficit present.      Mental Status: He is alert and oriented to person, place, and time.   Psychiatric:         Attention and Perception: Attention and perception normal.         Mood and Affect: Mood and affect normal.         Speech: Speech normal.         Behavior: Behavior normal. Behavior is cooperative.         Thought Content: Thought content normal.         Cognition and Memory: Cognition and memory normal.         Judgment: Judgment normal.         Assessment / Plan      Assessment/Plan:   Diagnoses and all orders for this  visit:    1. Annual physical exam (Primary)  -     Comprehensive Metabolic Panel; Future  -     CBC Auto Differential; Future  -     Lipid Panel; Future    2. Alcohol dependence, daily use (HCC)  Assessment & Plan:  Psychological condition is stable.  Referral to psychiatry for substance abuse counseling and medical management of alcoholism  Psychological condition  will be reassessed at the next regular appointment.    Orders:  -     Ambulatory Referral to Behavioral Health  -     Vitamin B1, Whole Blood; Future  -     Cancel: Vitamin B12  -     Vitamin B12; Future  -     Ambulatory Referral to Psychiatry    3. Primary hypertension  Assessment & Plan:  Hypertension is improving with treatment.  Continue current treatment regimen.  Dietary sodium restriction.  Weight loss.  Regular aerobic exercise.  Continue current medications.  Ambulatory blood pressure monitoring.  Blood pressure will be reassessed at the next regular appointment.      4. Screening for malignant neoplasm of colon  -     Ambulatory Referral For Screening Colonoscopy    5. Need for hepatitis C screening test  -     Hepatitis C Antibody; Future         Follow Up:   PRN and at next scheduled appointment(s) with PCP.    Discussed the nature of the medical condition(s) risks, complications, implications, management, safe and proper use of medications. Encouraged medication compliance, and keeping scheduled follow up appointments with me and any other providers.      The patient is here for a health maintenance visit.  Currently, the patient consumes a healthy diet and has an adequate exercise regimen. Screening lab work is ordered.  Immunizations are declined today and vaccine education is provided.  Advice and education is given regarding nutrition, aerobic exercise, routine dental evaluations, routine eye exams, reproductive health, cardiovascular risk reduction, sunscreen use, self skin examination (annual dermatology evaluations) and seat belt use  (general overall safety).  Further recommendations after lab evaluation.  Annual wellness evaluations recommended.      TANYA Blair  [facility]

## 2021-10-31 PROBLEM — F10.20 ALCOHOL DEPENDENCE, DAILY USE (HCC): Status: ACTIVE | Noted: 2021-10-31

## 2021-10-31 PROBLEM — F10.20 ALCOHOL DEPENDENCE, DAILY USE (HCC): Chronic | Status: ACTIVE | Noted: 2021-10-31

## 2021-10-31 PROBLEM — G40.209 COMPLEX PARTIAL EPILEPSY: Chronic | Status: ACTIVE | Noted: 2017-05-09

## 2021-10-31 PROBLEM — I10 HTN (HYPERTENSION): Chronic | Status: ACTIVE | Noted: 2017-03-09

## 2021-10-31 PROBLEM — N40.0 BPH WITHOUT URINARY OBSTRUCTION: Chronic | Status: ACTIVE | Noted: 2017-03-09

## 2021-10-31 NOTE — PATIENT INSTRUCTIONS
Alcohol Abuse and Dependence Information, Adult  Alcohol is a widely available drug. People drink alcohol in different amounts. People who drink alcohol very often and in large amounts often have problems during and after drinking. They may develop what is called an alcohol use disorder. There are two main types of alcohol use disorders:  · Alcohol abuse. This is when you use alcohol too much or too often. You may use alcohol to make yourself feel happy or to reduce stress. You may have a hard time setting a limit on the amount you drink.  · Alcohol dependence. This is when you use alcohol consistently for a period of time, and your body changes as a result. This can make it hard to stop drinking because you may start to feel sick or feel different when you do not use alcohol. These symptoms are known as withdrawal.  How can alcohol abuse and dependence affect me?  Alcohol abuse and dependence can have a negative effect on your life. Drinking too much can lead to addiction. You may feel like you need alcohol to function normally. You may drink alcohol before work in the morning, during the day, or as soon as you get home from work in the evening. These actions can result in:  · Poor work performance.  · Job loss.  · Financial problems.  · Car crashes or criminal charges from driving after drinking alcohol.  · Problems in your relationships with friends and family.  · Losing the trust and respect of coworkers, friends, and family.  Drinking heavily over a long period of time can permanently damage your body and brain, and can cause lifelong health issues, such as:  · Damage to your liver or pancreas.  · Heart problems, high blood pressure, or stroke.  · Certain cancers.  · Decreased ability to fight infections.  · Brain or nerve damage.  · Depression.  · Early (premature) death.  If you are careless or you crave alcohol, it is easy to drink more than your body can handle (overdose). Alcohol overdose is a serious  situation that requires hospitalization. It may lead to permanent injuries or death.  What can increase my risk?  · Having a family history of alcohol abuse.  · Having depression or other mental health conditions.  · Beginning to drink at an early age.  · Binge drinking often.  · Experiencing trauma, stress, and an unstable home life during childhood.  · Spending time with people who drink often.  What actions can I take to prevent or manage alcohol abuse and dependence?  · Do not drink alcohol if:  ? Your health care provider tells you not to drink.  ? You are pregnant, may be pregnant, or are planning to become pregnant.  · If you drink alcohol:  ? Limit how much you use to:  § 0-1 drink a day for women.  § 0-2 drinks a day for men.  ? Be aware of how much alcohol is in your drink. In the [place] one drink equals one 12 oz bottle of beer (355 mL), one 5 oz glass of wine (148 mL), or one 1½ oz glass of hard liquor (44 mL).  · Stop drinking if you have been drinking too much. This can be very hard to do if you are used to abusing alcohol. If you begin to have withdrawal symptoms, talk with your health care provider or a person that you trust. These symptoms may include anxiety, shaky hands, headache, nausea, sweating, or not being able to sleep.  · Choose to drink nonalcoholic beverages in social gatherings and places where there may be alcohol.  Activity  · Spend more time on activities that you enjoy that do not involve alcohol, like hobbies or exercise.  · Find healthy ways to cope with stress, such as exercise, meditation, or spending time with people you care about.  General information  · Talk to your family, coworkers, and friends about supporting you in your efforts to stop drinking. If they drink, ask them not to drink around you. Spend more time with people who do not drink alcohol.  · If you think that you have an alcohol dependency problem:  ? Tell friends or family about your concerns.  ? Talk with your  health care provider or another health professional about where to get help.  ? Work with a therapist and a chemical dependency counselor.  ? Consider joining a support group for people who struggle with alcohol abuse and dependence.  Where to find support    · Your health care provider.  · SMART Recovery: www.smartrecovery.org  Therapy and support groups  · Local treatment centers or chemical dependency counselors.  · Local AA groups in your community: www.aa.org  Where to find more information  · Centers for Disease Control and Prevention: www.cdc.gov  · National [place] on Alcohol Abuse and Alcoholism: www.niaaa.nih.gov  · Alcoholics Anonymous (AA): www.aa.org  Contact a health care provider if:  · You drank more or for longer than you intended on more than one occasion.  · You tried to stop drinking or to cut back on how much you drink, but you were not able to.  · You often drink to the point of vomiting or passing out.  · You want to drink so badly that you cannot think about anything else.  · You have problems in your life due to drinking, but you continue to drink.  · You keep drinking even though you feel anxious, depressed, or have experienced memory loss.  · You have stopped doing the things you used to enjoy in order to drink.  · You have to drink more than you used to in order to get the effect you want.  · You experience anxiety, sweating, nausea, shakiness, and trouble sleeping when you try to stop drinking.  Get help right away if:  · You have thoughts about hurting yourself or others.  · You have serious withdrawal symptoms, including:  ? Confusion.  ? Racing heart.  ? High blood pressure.  ? Fever.  If you ever feel like you may hurt yourself or others, or have thoughts about taking your own life, get help right away. You can go to your nearest emergency department or call:  · Your local emergency services (911 in the [place]  · A suicide crisis helpline, such as the National Suicide Prevention  [place] at 1-684.194.6994. This is open 24 hours a day.  Summary  · Alcohol abuse and dependence can have a negative effect on your life. Drinking too much or too often can lead to addiction.  · If you drink alcohol, limit how much you use.  · If you are having trouble keeping your drinking under control, find ways to change your behavior. Hobbies, calming activities, exercise, or support groups can help.  · If you feel you need help with changing your drinking habits, talk with your health care provider, a good friend, or a therapist, or go to an AA group.  This information is not intended to replace advice given to you by your health care provider. Make sure you discuss any questions you have with your health care provider.  Document Revised: 04/07/2020 Document Reviewed: 02/25/2020  [employer] Patient Education © 2021 [employer]    Health Maintenance, Male  Adopting a healthy lifestyle and getting preventive care are important in promoting health and wellness. Ask your health care provider about:  · The right schedule for you to have regular tests and exams.  · Things you can do on your own to prevent diseases and keep yourself healthy.  What should I know about diet, weight, and exercise?  Eat a healthy diet    · Eat a diet that includes plenty of vegetables, fruits, low-fat dairy products, and lean protein.  · Do not eat a lot of foods that are high in solid fats, added sugars, or sodium.    Maintain a healthy weight  Body mass index (BMI) is a measurement that can be used to identify possible weight problems. It estimates body fat based on height and weight. Your health care provider can help determine your BMI and help you achieve or maintain a healthy weight.  Get regular exercise  Get regular exercise. This is one of the most important things you can do for your health. Most adults should:  · Exercise for at least 150 minutes each week. The exercise should increase your heart rate and make you sweat  (moderate-intensity exercise).  · Do strengthening exercises at least twice a week. This is in addition to the moderate-intensity exercise.  · Spend less time sitting. Even light physical activity can be beneficial.  Watch cholesterol and blood lipids  Have your blood tested for lipids and cholesterol at 20 years of age, then have this test every 5 years.  You may need to have your cholesterol levels checked more often if:  · Your lipid or cholesterol levels are high.  · You are older than 40 years of age.  · You are at high risk for heart disease.  What should I know about cancer screening?  Many types of cancers can be detected early and may often be prevented. Depending on your health history and family history, you may need to have cancer screening at various ages. This may include screening for:  · Colorectal cancer.  · Prostate cancer.  · Skin cancer.  · Lung cancer.  What should I know about heart disease, diabetes, and high blood pressure?  Blood pressure and heart disease  · High blood pressure causes heart disease and increases the risk of stroke. This is more likely to develop in people who have high blood pressure readings, are of  descent, or are overweight.  · Talk with your health care provider about your target blood pressure readings.  · Have your blood pressure checked:  ? Every 3-5 years if you are 18-39 years of age.  ? Every year if you are 40 years old or older.  · If you are between the ages of 65 and 75 and are a current or former smoker, ask your health care provider if you should have a one-time screening for abdominal aortic aneurysm (AAA).  Diabetes  Have regular diabetes screenings. This checks your fasting blood sugar level. Have the screening done:  · Once every three years after age 45 if you are at a normal weight and have a low risk for diabetes.  · More often and at a younger age if you are overweight or have a high risk for diabetes.  What should I know about preventing  infection?  Hepatitis B  If you have a higher risk for hepatitis B, you should be screened for this virus. Talk with your health care provider to find out if you are at risk for hepatitis B infection.  Hepatitis C  Blood testing is recommended for:  · Everyone born from 1945 through 1965.  · Anyone with known risk factors for hepatitis C.  Sexually transmitted infections (STIs)  · You should be screened each year for STIs, including gonorrhea and chlamydia, if:  ? You are sexually active and are younger than 24 years of age.  ? You are older than 24 years of age and your health care provider tells you that you are at risk for this type of infection.  ? Your sexual activity has changed since you were last screened, and you are at increased risk for chlamydia or gonorrhea. Ask your health care provider if you are at risk.  · Ask your health care provider about whether you are at high risk for HIV. Your health care provider may recommend a prescription medicine to help prevent HIV infection. If you choose to take medicine to prevent HIV, you should first get tested for HIV. You should then be tested every 3 months for as long as you are taking the medicine.  Follow these instructions at home:  Lifestyle  · Do not use any products that contain nicotine or tobacco, such as cigarettes, e-cigarettes, and chewing tobacco. If you need help quitting, ask your health care provider.  · Do not use street drugs.  · Do not share needles.  · Ask your health care provider for help if you need support or information about quitting drugs.  Alcohol use  · Do not drink alcohol if your health care provider tells you not to drink.  · If you drink alcohol:  ? Limit how much you have to 0-2 drinks a day.  ? Be aware of how much alcohol is in your drink. In the [place] one drink equals one 12 oz bottle of beer (355 mL), one 5 oz glass of wine (148 mL), or one 1½ oz glass of hard liquor (44 mL).  General instructions  · Schedule regular health,  dental, and eye exams.  · Stay current with your vaccines.  · Tell your health care provider if:  ? You often feel depressed.  ? You have ever been abused or do not feel safe at home.  Summary  · Adopting a healthy lifestyle and getting preventive care are important in promoting health and wellness.  · Follow your health care provider's instructions about healthy diet, exercising, and getting tested or screened for diseases.  · Follow your health care provider's instructions on monitoring your cholesterol and blood pressure.  This information is not intended to replace advice given to you by your health care provider. Make sure you discuss any questions you have with your health care provider.  Document Revised: 12/11/2019 Document Reviewed: 12/11/2019  [employer] Patient Education © 2021 [employer]    Managing Your Hypertension  Hypertension, also called high blood pressure, is when the force of the blood pressing against the walls of the arteries is too strong. Arteries are blood vessels that carry blood from your heart throughout your body. Hypertension forces the heart to work harder to pump blood and may cause the arteries to become narrow or stiff.  Understanding blood pressure readings  Your personal target blood pressure may vary depending on your medical conditions, your age, and other factors. A blood pressure reading includes a higher number over a lower number. Ideally, your blood pressure should be below 120/80. You should know that:  · The first, or top, number is called the systolic pressure. It is a measure of the pressure in your arteries as your heart beats.  · The second, or bottom number, is called the diastolic pressure. It is a measure of the pressure in your arteries as the heart relaxes.  Blood pressure is classified into four stages. Based on your blood pressure reading, your health care provider may use the following stages to determine what type of treatment you need, if any. Systolic  pressure and diastolic pressure are measured in a unit called mmHg.  Normal  · Systolic pressure: below 120.  · Diastolic pressure: below 80.  Elevated  · Systolic pressure: 120-129.  · Diastolic pressure: below 80.  Hypertension stage 1  · Systolic pressure: 130-139.  · Diastolic pressure: 80-89.  Hypertension stage 2  · Systolic pressure: 140 or above.  · Diastolic pressure: 90 or above.  How can this condition affect me?  Managing your hypertension is an important responsibility. Over time, hypertension can damage the arteries and decrease blood flow to important parts of the body, including the brain, heart, and kidneys. Having untreated or uncontrolled hypertension can lead to:  · A heart attack.  · A stroke.  · A weakened blood vessel (aneurysm).  · Heart failure.  · Kidney damage.  · Eye damage.  · Metabolic syndrome.  · Memory and concentration problems.  · Vascular dementia.  What actions can I take to manage this condition?  Hypertension can be managed by making lifestyle changes and possibly by taking medicines. Your health care provider will help you make a plan to bring your blood pressure within a normal range.  Nutrition    · Eat a diet that is high in fiber and potassium, and low in salt (sodium), added sugar, and fat. An example eating plan is called the Dietary Approaches to Stop Hypertension (DASH) diet. To eat this way:  ? Eat plenty of fresh fruits and vegetables. Try to fill one-half of your plate at each meal with fruits and vegetables.  ? Eat whole grains, such as whole-wheat pasta, brown rice, or whole-grain bread. Fill about one-fourth of your plate with whole grains.  ? Eat low-fat dairy products.  ? Avoid fatty cuts of meat, processed or cured meats, and poultry with skin. Fill about one-fourth of your plate with lean proteins such as fish, chicken without skin, beans, eggs, and tofu.  ? Avoid pre-made and processed foods. These tend to be higher in sodium, added sugar, and fat.  · Reduce  your daily sodium intake. Most people with hypertension should eat less than 1,500 mg of sodium a day.    Lifestyle    · Work with your health care provider to maintain a healthy body weight or to lose weight. Ask what an ideal weight is for you.  · Get at least 30 minutes of exercise that causes your heart to beat faster (aerobic exercise) most days of the week. Activities may include walking, swimming, or biking.  · Include exercise to strengthen your muscles (resistance exercise), such as weight lifting, as part of your weekly exercise routine. Try to do these types of exercises for 30 minutes at least 3 days a week.  · Do not use any products that contain nicotine or tobacco, such as cigarettes, e-cigarettes, and chewing tobacco. If you need help quitting, ask your health care provider.  · Control any long-term (chronic) conditions you have, such as high cholesterol or diabetes.  · Identify your sources of stress and find ways to manage stress. This may include meditation, deep breathing, or making time for fun activities.    Alcohol use  · Do not drink alcohol if:  ? Your health care provider tells you not to drink.  ? You are pregnant, may be pregnant, or are planning to become pregnant.  · If you drink alcohol:  ? Limit how much you use to:  § 0-1 drink a day for women.  § 0-2 drinks a day for men.  ? Be aware of how much alcohol is in your drink. In the [place] one drink equals one 12 oz bottle of beer (355 mL), one 5 oz glass of wine (148 mL), or one 1½ oz glass of hard liquor (44 mL).  Medicines  Your health care provider may prescribe medicine if lifestyle changes are not enough to get your blood pressure under control and if:  · Your systolic blood pressure is 130 or higher.  · Your diastolic blood pressure is 80 or higher.  Take medicines only as told by your health care provider. Follow the directions carefully. Blood pressure medicines must be taken as told by your health care provider. The medicine  does not work as well when you skip doses. Skipping doses also puts you at risk for problems.  Monitoring  Before you monitor your blood pressure:  · Do not smoke, drink caffeinated beverages, or exercise within 30 minutes before taking a measurement.  · Use the bathroom and empty your bladder (urinate).  · Sit quietly for at least 5 minutes before taking measurements.  Monitor your blood pressure at home as told by your health care provider. To do this:  · Sit with your back straight and supported.  · Place your feet flat on the floor. Do not cross your legs.  · Support your arm on a flat surface, such as a table. Make sure your upper arm is at heart level.  · Each time you measure, take two or three readings one minute apart and record the results.  You may also need to have your blood pressure checked regularly by your health care provider.  General information  · Talk with your health care provider about your diet, exercise habits, and other lifestyle factors that may be contributing to hypertension.  · Review all the medicines you take with your health care provider because there may be side effects or interactions.  · Keep all visits as told by your health care provider. Your health care provider can help you create and adjust your plan for managing your high blood pressure.  Where to find more information  · National Heart, Lung, and [facility] [place]: www.nhlbi.nih.gov  · American Heart Association: www.heart.org  Contact a health care provider if:  · You think you are having a reaction to medicines you have taken.  · You have repeated (recurrent) headaches.  · You feel dizzy.  · You have swelling in your ankles.  · You have trouble with your vision.  Get help right away if:  · You develop a severe headache or confusion.  · You have unusual weakness or numbness, or you feel faint.  · You have severe pain in your chest or abdomen.  · You vomit repeatedly.  · You have trouble breathing.  These symptoms may  represent a serious problem that is an emergency. Do not wait to see if the symptoms will go away. Get medical help right away. Call your local emergency services (911 in the [place] Do not drive yourself to the hospital.  Summary  · Hypertension is when the force of blood pumping through your arteries is too strong. If this condition is not controlled, it may put you at risk for serious complications.  · Your personal target blood pressure may vary depending on your medical conditions, your age, and other factors. For most people, a normal blood pressure is less than 120/80.  · Hypertension is managed by lifestyle changes, medicines, or both.  · Lifestyle changes to help manage hypertension include losing weight, eating a healthy, low-sodium diet, exercising more, stopping smoking, and limiting alcohol.  This information is not intended to replace advice given to you by your health care provider. Make sure you discuss any questions you have with your health care provider.  Document Revised: 01/22/2021 Document Reviewed: 11/17/2020  [employer] Patient Education © 2021 [employer]    Preventive Care 40-64 Years Old, Male  Preventive care refers to lifestyle choices and visits with your health care provider that can promote health and wellness. This includes:  · A yearly physical exam. This is also called an annual well check.  · Regular dental and eye exams.  · Immunizations.  · Screening for certain conditions.  · Healthy lifestyle choices, such as eating a healthy diet, getting regular exercise, not using drugs or products that contain nicotine and tobacco, and limiting alcohol use.  What can I expect for my preventive care visit?  Physical exam  Your health care provider will check:  · Height and weight. These may be used to calculate body mass index (BMI), which is a measurement that tells if you are at a healthy weight.  · Heart rate and blood pressure.  · Your skin for abnormal spots.  Counseling  Your health  care provider may ask you questions about:  · Alcohol, tobacco, and drug use.  · Emotional well-being.  · Home and relationship well-being.  · Sexual activity.  · Eating habits.  · Work and work environment.  What immunizations do I need?    Influenza (flu) vaccine  · This is recommended every year.  Tetanus, diphtheria, and pertussis (Tdap) vaccine  · You may need a Td booster every 10 years.  Varicella (chickenpox) vaccine  · You may need this vaccine if you have not already been vaccinated.  Zoster (shingles) vaccine  · You may need this after age 60.  Measles, mumps, and rubella (MMR) vaccine  · You may need at least one dose of MMR if you were born in 1957 or later. You may also need a second dose.  Pneumococcal conjugate (PCV13) vaccine  · You may need this if you have certain conditions and were not previously vaccinated.  Pneumococcal polysaccharide (PPSV23) vaccine  · You may need one or two doses if you smoke cigarettes or if you have certain conditions.  Meningococcal conjugate (MenACWY) vaccine  · You may need this if you have certain conditions.  Hepatitis A vaccine  · You may need this if you have certain conditions or if you travel or work in places where you may be exposed to hepatitis A.  Hepatitis B vaccine  · You may need this if you have certain conditions or if you travel or work in places where you may be exposed to hepatitis B.  Haemophilus influenzae type b (Hib) vaccine  · You may need this if you have certain risk factors.  Human papillomavirus (HPV) vaccine  · If recommended by your health care provider, you may need three doses over 6 months.  You may receive vaccines as individual doses or as more than one vaccine together in one shot (combination vaccines). Talk with your health care provider about the risks and benefits of combination vaccines.  What tests do I need?  Blood tests  · Lipid and cholesterol levels. These may be checked every 5 years, or more frequently if you are over 50  years old.  · Hepatitis C test.  · Hepatitis B test.  Screening  · Lung cancer screening. You may have this screening every year starting at age 55 if you have a 30-pack-year history of smoking and currently smoke or have quit within the past 15 years.  · Prostate cancer screening. Recommendations will vary depending on your family history and other risks.  · Colorectal cancer screening. All adults should have this screening starting at age 50 and continuing until age 75. Your health care provider may recommend screening at age 45 if you are at increased risk. You will have tests every 1-10 years, depending on your results and the type of screening test.  · Diabetes screening. This is done by checking your blood sugar (glucose) after you have not eaten for a while (fasting). You may have this done every 1-3 years.  · Sexually transmitted disease (STD) testing.  Follow these instructions at home:  Eating and drinking  · Eat a diet that includes fresh fruits and vegetables, whole grains, lean protein, and low-fat dairy products.  · Take vitamin and mineral supplements as recommended by your health care provider.  · Do not drink alcohol if your health care provider tells you not to drink.  · If you drink alcohol:  ? Limit how much you have to 0-2 drinks a day.  ? Be aware of how much alcohol is in your drink. In the [place] one drink equals one 12 oz bottle of beer (355 mL), one 5 oz glass of wine (148 mL), or one 1½ oz glass of hard liquor (44 mL).  Lifestyle  · Take daily care of your teeth and gums.  · Stay active. Exercise for at least 30 minutes on 5 or more days each week.  · Do not use any products that contain nicotine or tobacco, such as cigarettes, e-cigarettes, and chewing tobacco. If you need help quitting, ask your health care provider.  · If you are sexually active, practice safe sex. Use a condom or other form of protection to prevent STIs (sexually transmitted infections).  · Talk with your health care  "provider about taking a low-dose aspirin every day starting at age 50.  What's next?  · Go to your health care provider once a year for a well check visit.  · Ask your health care provider how often you should have your eyes and teeth checked.  · Stay up to date on all vaccines.  This information is not intended to replace advice given to you by your health care provider. Make sure you discuss any questions you have with your health care provider.  Document Revised: 12/12/2019 Document Reviewed: 12/12/2019  [employer] Patient Education © 2020 [employer]    https://www.cancer.org/cancer/colon-rectal-cancer/causes-risks-prevention/risk-factors.html\">   Colorectal Cancer Screening    Colorectal cancer screening is a group of tests that are used to check for colorectal cancer before symptoms develop. Colorectal refers to the colon and rectum. The colon and rectum are located at the end of the digestive tract and carry stool (feces) out of the body.  Who should have screening?  All adults who are 45-75 years old should have screening. Your health care provider may recommend screening before age 45. You will have tests every 1-10 years, depending on your results and the type of screening test. Screening recommendations for adults who are 76-85 years old vary depending on a person's health. People older than age 85 should no longer get colorectal cancer screening.  You may have screening tests starting before age 45, or more often than other people, if you have any of these risk factors:  · A personal or family history of colorectal cancer or abnormal growths known as polyps in your colon.  · Inflammatory bowel disease, such as ulcerative colitis or Crohn's disease.  · A history of having radiation treatment to the abdomen or the area between the hip bones (pelvic area) for cancer.  · A type of genetic syndrome that is passed from parent to child (hereditary), such as:  ? Rice syndrome.  ? Familial adenomatous " polyposis.  ? Turcot syndrome.  ? Peutz-Jeghers syndrome.  ? MUTYH-associated polyposis (MAP).  · A personal history of diabetes.  Types of tests  There are several types of colorectal screening tests. You may have one or more of the following:  · Guaiac-based fecal occult blood testing. For this test, a stool sample is checked for hidden (occult) blood, which could be a sign of colorectal cancer.  · Fecal immunochemical test (FIT). For this test, a stool sample is checked for blood, which could be a sign of colorectal cancer.  · Stool DNA test. For this test, a stool sample is checked for blood and changes in DNA that could lead to colorectal cancer.  · Sigmoidoscopy. During this test, a thin, flexible tube with a camera on the end, called a sigmoidoscope, is used to examine the rectum and the lower colon.  · Colonoscopy. During this test, a long, flexible tube with a camera on the end, called a colonoscope, is used to examine the entire colon and rectum. Also, sometimes a tissue sample is taken to be looked at under a microscope (biopsy) or small polyps are removed during this test.  · Virtual colonoscopy. Instead of a colonoscope, this type of colonoscopy uses a CT scan to take pictures of the colon and rectum. A CT scan is a type of X-ray that is made using computers.  What are the benefits of screening?  Screening reduces your risk for colorectal cancer and can help identify cancer at an early stage, when the cancer can be removed or treated more easily. It is common for polyps to form in the lining of the colon, especially as you age. These polyps may be cancerous or become cancerous over time. Screening can identify these polyps.  What are the risks of screening?  Generally, these are safe tests. However, problems may occur, including:  · The need for more tests to confirm results from a stool sample test. Stool sample tests have fewer risks than other types of screening tests.  · Being exposed to low levels  of radiation, if you had a test involving X-rays. This may slightly increase your cancer risk. The benefit of detecting cancer outweighs the slight increase in risk.  · Bleeding, damage to the intestine, or infection caused by a sigmoidoscopy or colonoscopy.  · A reaction to medicines given during a sigmoidoscopy or colonoscopy.  Talk with your health care provider to understand your risk for colorectal cancer and to make a screening plan that is right for you.  Questions to ask your health care provider  · When should I start colorectal cancer screening?  · What is my risk for colorectal cancer?  · How often do I need screening?  · Which screening tests do I need?  · How do I get my test results?  · What do my results mean?  Where to find more information  Learn more about colorectal cancer screening from:  · The American Cancer Society: cancer.org  · National Cancer [place]: cancer.gov  Summary  · Colorectal cancer screening is a group of tests used to check for colorectal cancer before symptoms develop.  · All adults who are 45-75 years old should have screening. Your health care provider may recommend screening before age 45.  · You may have screening tests starting before age 45, or more often than other people, if you have certain risk factors.  · Screening reduces your risk for colorectal cancer and can help identify cancer at an early stage, when the cancer can be removed or treated more easily.  · Talk with your health care provider to understand your risk for colorectal cancer and to make a screening plan that is right for you.  This information is not intended to replace advice given to you by your health care provider. Make sure you discuss any questions you have with your health care provider.  Document Revised: 04/07/2021 Document Reviewed: 04/07/2021  [employer] Patient Education © 2021 [employer]    Immunization Schedule, 50-64 Years Old    Vaccines are usually given at various ages, according to  a schedule. Your health care provider will recommend vaccines for you based on your age, medical history, and lifestyle or other factors such as travel or where you work.  You may receive vaccines as individual doses or as more than one vaccine together in one shot (combination vaccines). Talk with your health care provider about the risks and benefits of combination vaccines.  Recommended immunizations for 50-64 years old  Influenza vaccine  · You should get a dose of the influenza vaccine every year.  Tetanus, diphtheria, and pertussis vaccine  A vaccine that protects against tetanus, diphtheria, and pertussis is known as the Tdap vaccine. A vaccine that protects against tetanus and diphtheria is known as the Td vaccine.  · You should only get the Td vaccine if you have had at least 1 dose of the Tdap vaccine.  · You should get 1 dose of the Td or Tdap vaccine every 10 years, or you should get 1 dose of the Tdap vaccine if:  ? You have not previously gotten a Tdap vaccine.  ? You do not know if you have ever gotten a Tdap vaccine.  Zoster vaccine  This is also known as the RZV vaccine. You should get 2 doses of the RZV vaccine 2 to 6 months apart. It is important to get the RZV vaccine even if you:  · Have had shingles.  · Have received the ZVL vaccine, an older version of the RZV vaccine.  · Are unsure if you have had chickenpox (varicella).  Pneumococcal conjugate vaccine  This is also known as the PCV13 vaccine. You should get the PCV13 vaccine as recommended if you have certain high-risk conditions. These include:  · Diabetes.  · Chronic conditions of the heart, lungs, or liver.  · Conditions that affect the body's disease-fighting system (immune system).  Pneumococcal polysaccharide vaccine  This is also known as the PPSV23 vaccine. You should get the PPSV23 vaccine as recommended if you have certain high-risk conditions. These include:  · Diabetes.  · Chronic conditions of the heart, lungs, or  liver.  · Conditions that affect the immune system.  Hepatitis A vaccine  This is also known as the HepA vaccine. If you did not get the HepA vaccine previously, you should get it if:  · You are at risk for a hepatitis A infection. You may be at risk for infection if you:  ? Have chronic liver disease.  ? Have HIV or AIDS.  ? Are a man who has sex with men.  ? Use drugs.  ? Are homeless.  ? May be exposed to hepatitis A through work.  ? Travel to countries where hepatitis A is common.  ? Have or will have close contact with someone who was adopted from another country.  · You are not at risk for infection but want protection from hepatitis A.  Hepatitis B vaccine  This is also known as the HepB vaccine. If you did not get the HepB vaccine previously, you should get it if:  · You are at risk for hepatitis B infection. You are at risk if you:  ? Have chronic liver disease.  ? Have HIV or AIDS.  ? Have sex with a partner who has hepatitis B, or:  § You have multiple sex partners.  § You are a man who has sex with men.  ? Use drugs.  ? May be exposed to hepatitis B through work.  ? Live with someone who has hepatitis B.  ? Receive dialysis treatment.  ? Have diabetes.  ? Travel to countries where hepatitis B is common.  · You are not at risk of infection but want protection from hepatitis B.  Measles, mumps, and rubella vaccine  This is also known as the MMR vaccine. You may need to get the MMR vaccine if you were born in 1957 or later and:  · You need to catch up on doses you missed in the past.  · You have not been given the vaccine before.  · You do not have evidence of immunity (by a blood test).  Varicella vaccine  This is also known as the ASAF vaccine. You may need to get the ASAF vaccine if you do not have evidence of immunity (by a blood test) and you may be exposed to varicella through work. This is especially important if you work in a health care setting.  Meningococcal conjugate vaccine  This is also known as  the MenACWY vaccine. You may need to get the MenACWY vaccine if you:  · Have not been given the vaccine before.  · Need to catch up on doses you missed in the past.  This vaccine is especially important if you:  · Do not have a spleen.  · Have sickle cell disease.  · Have HIV.  · Take medicines that suppress your immune system.  · Travel to countries where meningococcal disease is common.  · Are exposed to Neisseria meningitidis at work.  Serogroup B meningococcal vaccine  This is also known as the MenB vaccine. You may need to get the MenB vaccine if you:  · Have not been given the vaccine before.  · Need to catch up on doses you missed in the past.  This vaccine is especially important if you:  · Do not have a spleen.  · Have sickle cell disease.  · Take medicines that suppress your immune system.  · Are exposed to Neisseria meningitidis at work.  Haemophilus influenzae type b vaccine  This is also known as the Hib vaccine. Anyone older than 5 years of age is usually not given the Hib vaccine. However, if you have certain high-risk conditions, you may need to get this vaccine. These conditions include:  · Not having a spleen.  · Having received a stem cell transplant.  Before you get a vaccine:  Talk with your health care provider about which vaccines are right for you. This is especially important if:  · You previously had a reaction after getting a vaccine.  · You have a weakened immune system. You may have a weakened immune system if you:  ? Are taking medicines that reduce (suppress) the activity of your immune system.  ? Are taking medicines to treat cancer (chemotherapy).  ? Have HIV or AIDS.  · You work in an environment where you may be exposed to a disease.  · You plan to travel outside of the country.  · You have a chronic illness, such as heart disease, kidney disease, diabetes, or lung disease.  Summary  · Before you get a vaccine, tell your health care provider if you have reacted to vaccines in the  past or have a condition that weakens your immune system.  · At 50-64 years, you should get a dose of the flu vaccine every year and a dose of the Td or Tdap vaccine every 10 years.  · You should get 2 doses of the RZV vaccine 2 to 6 months apart.  · Depending on your medical history and your risk factors, you may need other vaccines. Ask your health care provider whether you are up to date on all your vaccines.  This information is not intended to replace advice given to you by your health care provider. Make sure you discuss any questions you have with your health care provider.  Document Revised: 10/13/2020 Document Reviewed: 10/13/2020  [employer] Patient Education © 2021 [employer]

## 2021-10-31 NOTE — PROGRESS NOTES
"     Follow Up Office Note     Patient Name: Randy Asif  : 1960   MRN: [id number]     Chief Complaint:    Chief Complaint   Patient presents with   • Annual Exam       History of Present Illness: Randy Asif is a 61 y.o. male who presents today ***  HPI     Subjective      Review of Systems:   Review of Systems     Past Medical History:   Past Medical History:   Diagnosis Date   • Abnormal finding on MRI of brain    • Asthmatic bronchitis    • Brain cancer (HCC)    • Elevated AST (SGOT)    • Elevated glucose    • Seizures (HCC)    • Shingles          Medications:     Current Outpatient Medications:   •  carvedilol (COREG) 25 MG tablet, Take 1 tablet by mouth 2 (Two) Times a Day With Meals. Follow-up for refills, Disp: 60 tablet, Rfl: 3  •  indapamide (LOZOL) 2.5 MG tablet, Take 1 tablet by mouth Every Morning., Disp: 30 tablet, Rfl: 2  •  lamoTRIgine (LaMICtal) 100 MG tablet, Take 200 mg by mouth 2 (Two) Times a Day., Disp: , Rfl:   •  losartan (COZAAR) 100 MG tablet, TAKE ONE TABLET BY MOUTH DAILY, Disp: 90 tablet, Rfl: 0  •  sildenafil (REVATIO) 20 MG tablet, TAKE FIVE TABLETS BY MOUTH DAILY AS NEEDED FOR ERECTILE DYSFUNCTION, Disp: 120 tablet, Rfl: 3    Allergies:   Allergies   Allergen Reactions   • Indapamide Myalgia     Muscle cramps     • Lisinopril Cough   • Oxycodone-Acetaminophen Anxiety   • Percocet [Oxycodone-Acetaminophen] Anxiety         Objective     Physical Exam:  Vital Signs:   Vitals:    10/29/21 0930   BP: 132/82   Pulse: 78   Resp: 20   Temp: 97 °F (36.1 °C)   SpO2: 98%   Weight: 104 kg (230 lb)   Height: 193 cm (76\")   PainSc: 0-No pain     Body mass index is 28 kg/m².     Physical Exam    Assessment / Plan        "

## 2021-10-31 NOTE — ASSESSMENT & PLAN NOTE
Psychological condition is stable.  Referral to psychiatry for substance abuse counseling and medical management of alcoholism  Psychological condition  will be reassessed at the next regular appointment.

## 2021-11-18 ENCOUNTER — TELEMEDICINE (OUTPATIENT)
Dept: PSYCHIATRY | Facility: CLINIC | Age: 61
End: 2021-11-18

## 2021-11-18 DIAGNOSIS — F41.1 GENERALIZED ANXIETY DISORDER: ICD-10-CM

## 2021-11-18 DIAGNOSIS — F10.20 ALCOHOL DEPENDENCE, DAILY USE (HCC): Primary | Chronic | ICD-10-CM

## 2021-11-18 PROCEDURE — 90792 PSYCH DIAG EVAL W/MED SRVCS: CPT

## 2021-11-18 RX ORDER — ACAMPROSATE CALCIUM 333 MG/1
666 TABLET, DELAYED RELEASE ORAL 3 TIMES DAILY
Qty: 180 TABLET | Refills: 0 | Status: SHIPPED | OUTPATIENT
Start: 2021-11-18 | End: 2021-12-10 | Stop reason: SDUPTHER

## 2021-11-18 NOTE — PROGRESS NOTES
"This provider is located at [place], [place]. The Patient is seen remotely using Video. Patient is being seen via telehealth and confirm that they are in a secure environment for this session. Patient is located in [place], [place]. The patient's condition being diagnosed/treated is appropriate for telemedicine. Provider identified as Manuel Perez as well as credentials APRN MSN PMHNP-BC.   The client/patient gave consent to be seen remotely, and when consent is given they understand that the consent allows for patient identifiable information to be sent to a third party as needed.  They may refuse to be seen remotely at any time. The electronic data is encrypted and password protected, and the patient has been advised of the potential risks to privacy not withstanding such measures.    Subjective     Randy Asif is a 61 y.o. male who presents today for initial evaluation     Chief Complaint: Alcohol use    History of Present Illness: This is the first encounter for this APRN with the patient.  Patient is referral from his primary care physician for evaluation of daily alcohol use.  Patient  5 years ago after being  for 22 years.  He states his ex-wife was a heavy drinker.  Patient reports that his drinking has been increasing over the last 5 years.  States currently he is up to drinking up to 6 shots of vodka daily.  Patient states he does not drink anything during the day, but a switch flips at 7 PM and that is when he drinks.  He states he recognizes this is a problem.  Patient is what he says in his own words a \"functioning alcoholic\".  He states he was listening to the podcast that mentioned there are medications that can help with cutting down and/or quitting drinking.  He has been a chiropractor for 33 years.  He states he walks 5-1/2 miles per day.  States he is trying to get his health in order.  Patient does have a history of a seizure disorder as result of a brain tumor that was " found when he was 20 years old.  Patient states he has not had a seizure in the last couple of years.  Patient states he has went 3 to 4 days without any alcohol when on vacation with his family, and did not experience any withdrawal symptoms during those times.  Patient denies any depressive type symptoms.  States he does not feel he has any problems in that area.  Denies any suicidal or homicidal ideation.  States his sleep and appetite are good.  He also denies having any type of anxiety.  He states that he may have some worry and over thinking traits, but does not feel that it is anything that needs to be treated at this time.  Patient denies any history of any manic type behaviors.  Denies any paranoia.  Denies any auditory or visual hallucinations.    The following portions of the patient's history were reviewed and updated as appropriate: allergies, current medications, past family history, past medical history, past social history, past surgical history and problem list.    Past Psychiatric History: Patient states he took Paxil for 1 year when he was in his 40s.  States at that time he had a bout with depression, but that was an isolated incident.  Denies any history of hospitalizations.  Denies any history of suicide attempts.    Family Psychiatric History: Patient's brother is a recovering alcoholic.  No suicides among first-degree relatives.    Substance Use History: Patient drinks up to 6 shots of vodka daily.  States he starts with 2 shots every night, and sometimes drinks more.  Denies any withdrawal symptoms when not drinking.  Denies any cravings.  Denies any history of seizures when stopping alcohol.  Denies any history of DTs.  Patient does have a seizure disorder, but states has not had a seizure in several years since being on Lamictal.    Past Medical History:  Past Medical History:   Diagnosis Date   • Abnormal finding on MRI of brain    • Asthmatic bronchitis    • Brain cancer (HCC)    •  Elevated AST (SGOT)    • Elevated glucose    • Seizures (HCC)    • Shingles        Social History: Patient was born in [place].  States he moved to [place] at age 4.  He was raised by his mother and father.  He has 1 older brother and 1 younger sister.  Denies any history of abuse growing up.  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  States he is down to working 12 hours/week and is close to halfway.  Patient was  for 22 years and has been  for the past 5 years.  He has 3 children total.  He has 2 biological children and 1 adopted daughter from [place].  Denies any legal issues.  Hobbies include watching football, and spending time with family.  Social History     Socioeconomic History   • Marital status:    Tobacco Use   • Smoking status: Never Smoker   • Smokeless tobacco: Never Used   Substance and Sexual Activity   • Alcohol use: Yes     Alcohol/week: 6.0 standard drinks     Types: 6 Standard drinks or equivalent per week     Comment: daily   • Drug use: No   • Sexual activity: Defer     Comment: Single        Family History:  Family History   Problem Relation Age of Onset   • No Known Problems Mother    • Heart disease Father    • Kidney disease Father    • Thyroid disease Sister        Past Surgical History:  Past Surgical History:   Procedure Laterality Date   • BRAIN TUMOR EXCISION  1981   • COLONOSCOPY      complete 2011  repeat 10 yrs       Problem List:  Patient Active Problem List   Diagnosis   • Generalized anxiety disorder   • BPH without urinary obstruction   • HTN (hypertension)   • Complex partial epilepsy (HCC)   • Overlapping malignant neoplasm of brain (HCC)   • Erectile dysfunction   • Alcohol dependence, daily use (HCC)       Allergy:   Allergies   Allergen Reactions   • Indapamide Myalgia     Muscle cramps     • Lisinopril Cough   • Oxycodone-Acetaminophen Anxiety   • Percocet [Oxycodone-Acetaminophen] Anxiety        Current Medications:    Current Outpatient Medications   Medication Sig Dispense Refill   • acamprosate (CAMPRAL) 333 MG EC tablet Take 2 tablets by mouth 3 (Three) Times a Day. 180 tablet 0   • carvedilol (COREG) 25 MG tablet Take 1 tablet by mouth 2 (Two) Times a Day With Meals. Follow-up for refills 60 tablet 3   • indapamide (LOZOL) 2.5 MG tablet Take 1 tablet by mouth Every Morning. 30 tablet 2   • lamoTRIgine (LaMICtal) 100 MG tablet Take 200 mg by mouth 2 (Two) Times a Day.     • losartan (COZAAR) 100 MG tablet TAKE ONE TABLET BY MOUTH DAILY 90 tablet 0   • sildenafil (REVATIO) 20 MG tablet TAKE FIVE TABLETS BY MOUTH DAILY AS NEEDED FOR ERECTILE DYSFUNCTION 120 tablet 3     No current facility-administered medications for this visit.       Review of Symptoms:    Review of Systems   Constitutional: Negative.    HENT: Negative.    Eyes: Negative.    Respiratory: Negative.    Cardiovascular:        Hypertension   Gastrointestinal: Negative.    Endocrine: Negative.    Genitourinary: Negative.    Musculoskeletal: Negative.    Skin: Negative.    Neurological: Positive for seizures.   Hematological: Negative.    Psychiatric/Behavioral: The patient is nervous/anxious.          Physical Exam:   There were no vitals taken for this visit.    Appearance: Normal  Gait, Station, Strength: The normal limits    Mental Status Exam:   Hygiene:   good  Cooperation:  Cooperative  Eye Contact:  Good  Psychomotor Behavior:  Appropriate  Affect:  Full range  Mood: normal  Hopelessness: Denies  Speech:  Normal  Thought Process:  Goal directed  Thought Content:  Normal  Suicidal:  None  Homicidal:  None  Hallucinations:  None  Delusion:  None  Memory:  Intact  Orientation:  Person, Place, Time and Situation  Reliability:  good  Insight:  Good  Judgement:  Good  Impulse Control:  Good    PHQ-9 Depression Screening  Little interest or pleasure in doing things? (P) 3   Feeling down, depressed, or hopeless? (P) 0   Trouble falling or staying asleep, or  sleeping too much? (P) 0   Feeling tired or having little energy? (P) 0   Poor appetite or overeating? (P) 0   Feeling bad about yourself - or that you are a failure or have let yourself or your family down? (P) 0   Trouble concentrating on things, such as reading the newspaper or watching television? (P) 0   Moving or speaking so slowly that other people could have noticed? Or the opposite - being so fidgety or restless that you have been moving around a lot more than usual? (P) 0   Thoughts that you would be better off dead, or of hurting yourself in some way? (P) 0   PHQ-9 Total Score (P) 3   If you checked off any problems, how difficult have these problems made it for you to do your work, take care of things at home, or get along with other people? (P) Not difficult at all     PHQ-9 Total Score: (P) 3    PILY 7 anxiety screening tool that patient filled out virtually reviewed by this APRN at today's encounter.    PROMIS scale screening tool that patient filled out virtually reviewed by this APRN at today's encounter.    [facility] request number [id number] reviewed by this APRN at today's encounter.    Previous Provider notes and available records reviewed by this APRN today.     Lab Results:   Admission on 08/27/2021, Discharged on 08/27/2021   Component Date Value Ref Range Status   • SARS-CoV-2 TERESITA 08/27/2021 Not Detected  Not Detected Final       Assessment/Plan   Problems Addressed this Visit        Mental Health    Alcohol dependence, daily use (HCC) - Primary (Chronic)    Relevant Medications    acamprosate (CAMPRAL) 333 MG EC tablet    Generalized anxiety disorder    Relevant Medications    acamprosate (CAMPRAL) 333 MG EC tablet      Diagnoses       Codes Comments    Alcohol dependence, daily use (HCC)    -  Primary ICD-10-CM: F10.20  ICD-9-CM: 303.91     Generalized anxiety disorder     ICD-10-CM: F41.1  ICD-9-CM: 300.02           Visit Diagnoses:    ICD-10-CM ICD-9-CM   1. Alcohol dependence, daily use  ([facility])  F10.20 303.91   2. Generalized anxiety disorder  F41.1 300.02     Discussed treatment options with the patient.  Patient states he had heard about naltrexone being used and alcohol disorders.  Discussed with patient that Campral may be an better option to try.  Patient agreeable to do so.  Start Campral 666 mg 3 times daily for alcohol use disorder.  Had a long discussion with patient about watching for any withdrawal type symptoms from stopping the alcohol.  Informed patient that if he noticed any tremors, increase in pulse, increase in temperature, increase in blood pressure, or any type of seizure activity that he should go to the emergency room immediately.  Informed patient that this can be life-threatening.  Patient verbalized understanding and agreed.  Informed patient that it could be possible that he will require an inpatient detox if the symptoms occur.  Patient does not want to start therapy at this time.  We will see patient again in 4 weeks to assess efficacy.  Instructed patient to abstain from using alcohol.  Asked patient if he thought his anxiety needed treated at this time for the excessive worrying.  He states he thinks it is manageable at this point.    TREATMENT PLAN/GOALS: Continue supportive psychotherapy efforts and medications as indicated. Treatment and medication options discussed during today's visit. Patient acknowledged and verbally consented to continue with current treatment plan and was educated on the importance of compliance with treatment and follow-up appointments.    Short Term Goals: Patient will be compliant with medication, and patient will have no significant medication related side effects.  Patient will be engaged in psychotherapy as indicated.  Patient will report subjective improvement of symptoms.    Long term goals: To stabilize mood and treat/improve subjective symptoms, the patient will stay out of the hospital, the patient will be at an optimal level of  functioning, and the patient will take all medications as prescribed.  The patient verbalized understanding and agreement with goals that were mutually set.    MEDICATION ISSUES:    Discussed medication options and treatment plan of prescribed medication as well as the risks, benefits, and side effects including potential falls, possible impaired driving and metabolic adversities among others. Patient is agreeable to call the office with any worsening of symptoms or onset of side effects. Patient is agreeable to call 911 or go to the nearest ER should he/she begin having SI/HI.     MEDS ORDERED DURING VISIT:  New Medications Ordered This Visit   Medications   • acamprosate (CAMPRAL) 333 MG EC tablet     Sig: Take 2 tablets by mouth 3 (Three) Times a Day.     Dispense:  180 tablet     Refill:  0       Return in about 4 weeks (around 12/16/2021) for Video visit.             This document has been electronically signed by TANYA Garzon  November 18, 2021 17:13 EST    Part of this note may be an electronic transmission of spoken language to printed text using the Dragon Dictation System.

## 2021-11-19 DIAGNOSIS — I10 ESSENTIAL HYPERTENSION: ICD-10-CM

## 2021-11-19 RX ORDER — CARVEDILOL 25 MG/1
TABLET ORAL
Qty: 60 TABLET | Refills: 2 | Status: SHIPPED | OUTPATIENT
Start: 2021-11-19 | End: 2022-02-21 | Stop reason: SDUPTHER

## 2021-12-10 DIAGNOSIS — F10.20 ALCOHOL DEPENDENCE, DAILY USE (HCC): Chronic | ICD-10-CM

## 2021-12-13 RX ORDER — ACAMPROSATE CALCIUM 333 MG/1
666 TABLET, DELAYED RELEASE ORAL 3 TIMES DAILY
Qty: 180 TABLET | Refills: 0 | Status: SHIPPED | OUTPATIENT
Start: 2021-12-13 | End: 2021-12-21 | Stop reason: SDUPTHER

## 2021-12-15 DIAGNOSIS — F10.20 ALCOHOL DEPENDENCE, DAILY USE (HCC): Chronic | ICD-10-CM

## 2021-12-15 RX ORDER — ACAMPROSATE CALCIUM 333 MG/1
TABLET, DELAYED RELEASE ORAL
Qty: 180 TABLET | Refills: 0 | OUTPATIENT
Start: 2021-12-15

## 2021-12-21 ENCOUNTER — TELEMEDICINE (OUTPATIENT)
Dept: PSYCHIATRY | Facility: CLINIC | Age: 61
End: 2021-12-21

## 2021-12-21 DIAGNOSIS — F41.1 GENERALIZED ANXIETY DISORDER: Primary | ICD-10-CM

## 2021-12-21 DIAGNOSIS — F10.20 ALCOHOL DEPENDENCE, DAILY USE (HCC): Chronic | ICD-10-CM

## 2021-12-21 PROCEDURE — 99214 OFFICE O/P EST MOD 30 MIN: CPT

## 2021-12-21 RX ORDER — ACAMPROSATE CALCIUM 333 MG/1
666 TABLET, DELAYED RELEASE ORAL 3 TIMES DAILY
Qty: 180 TABLET | Refills: 0 | Status: SHIPPED | OUTPATIENT
Start: 2021-12-21 | End: 2022-02-07 | Stop reason: SDUPTHER

## 2021-12-21 NOTE — PROGRESS NOTES
This provider is located at [place], [place]. The Patient is seen remotely using Video. Patient is being seen via telehealth and confirm that they are in a secure environment for this session. Patient is located in [place], [place]. The patient's condition being diagnosed/treated is appropriate for telemedicine. Provider identified as Manuel Perez as well as credentials TANYA MSN PMHNP-BC.   The client/patient gave consent to be seen remotely, and when consent is given they understand that the consent allows for patient identifiable information to be sent to a third party as needed.  They may refuse to be seen remotely at any time. The electronic data is encrypted and password protected, and the patient has been advised of the potential risks to privacy not withstanding such measures.    Chief Complaint  Alcohol Problem and Anxiety    Subjective        Randy Asif presents to [facility] for   History of Present Illness  Patient seen today for a follow-up visit for alcohol use, and anxiety.  Patient was started on Campral at last visit.  Patient states that he has saw some improvement.  He states the Campral helped decrease his worry and he does not feel as anxious as before.  He states that he had family over for [place] and he was not as worried about it as he has been in the past.  States his appetite and sleep are good.  He states that his nighttime drinking has stopped.  He states before finally wake up to go to the bathroom in the middle of the night that he would take a drink to help him get back to sleep.  States now that when he wakes up to go to the bathroom he can lay back down and go to sleep without having to do this.  He also relates this to not worrying as much as that is part of the reason that he was drinking to fall back asleep.  Patient denies any symptoms of depression.  Denies any suicidal or homicidal ideation.  Patient reports that he does not feel  anxious.  Patient reports that he is trying to make decisions to decrease his drinking.  Gives an example that he is scheduling things to do during the typical time that he drinks at home.  States that this has worked a couple of occasions.  States he is taking up a pottery class, stained glass window class, and hot yoga.  Patient still states he is drinking 4-6 shots of vodka every night.  Denies any side effects to the medication.  Denies any paranoia.  Denies any auditory or visual hallucinations.  Objective   Vital Signs:   There were no vitals taken for this visit.      PHQ-9 Score:   PHQ-9 Total Score: (P) 0     Mental Status Exam:   Hygiene:   good  Cooperation:  Cooperative  Eye Contact:  Good  Psychomotor Behavior:  Appropriate  Affect:  Full range  Mood: normal  Speech:  Normal  Thought Process:  Goal directed  Thought Content:  Normal  Suicidal:  None  Homicidal:  None  Hallucinations:  None  Delusion:  None  Memory:  Intact  Orientation:  Person, Place, Time and Situation  Reliability:  good  Insight:  Fair  Judgement:  Good  Impulse Control:  Good  Physical/Medical Issues:  No      PHQ-9 Depression Screening  Little interest or pleasure in doing things? (P) 0   Feeling down, depressed, or hopeless? (P) 0   Trouble falling or staying asleep, or sleeping too much? (P) 0   Feeling tired or having little energy? (P) 0   Poor appetite or overeating? (P) 0   Feeling bad about yourself - or that you are a failure or have let yourself or your family down? (P) 0   Trouble concentrating on things, such as reading the newspaper or watching television? (P) 0   Moving or speaking so slowly that other people could have noticed? Or the opposite - being so fidgety or restless that you have been moving around a lot more than usual? (P) 0   Thoughts that you would be better off dead, or of hurting yourself in some way? (P) 0   PHQ-9 Total Score (P) 0   If you checked off any problems, how difficult have these problems  made it for you to do your work, take care of things at home, or get along with other people? (P) Not difficult at all     PHQ-9 Total Score: (P) 0    PILY 7 anxiety screening tool that patient filled out virtually reviewed by this APRN at today's encounter.    PROMIS scale screening tool that patient filled out virtually reviewed by this APRN at today's encounter.    Previous Provider notes and available records reviewed by this APRN today.   Current Medications:   Current Outpatient Medications   Medication Sig Dispense Refill   • acamprosate (CAMPRAL) 333 MG EC tablet Take 2 tablets by mouth 3 (Three) Times a Day. 180 tablet 0   • carvedilol (COREG) 25 MG tablet TAKE ONE TABLET BY MOUTH TWICE A DAY WITH MEALS 60 tablet 2   • indapamide (LOZOL) 2.5 MG tablet Take 1 tablet by mouth Every Morning. 30 tablet 2   • lamoTRIgine (LaMICtal) 100 MG tablet Take 200 mg by mouth 2 (Two) Times a Day.     • losartan (COZAAR) 100 MG tablet TAKE ONE TABLET BY MOUTH DAILY 90 tablet 0   • sildenafil (REVATIO) 20 MG tablet TAKE FIVE TABLETS BY MOUTH DAILY AS NEEDED FOR ERECTILE DYSFUNCTION 120 tablet 3     No current facility-administered medications for this visit.       Physical Exam   Result Review :    The following data was reviewed by: TANYA Garzon on 12/21/2021:  Common labs    Common Labsle 4/16/21 4/16/21 4/16/21 4/16/21    0831 0831 0831 0831   Glucose   111 (A)    BUN   13    Creatinine   0.88    eGFR Non African Am   88    Sodium   137    Potassium   3.8    Chloride   96 (A)    Calcium   9.8    Albumin   4.60    Total Bilirubin   0.4    Alkaline Phosphatase   48    AST (SGOT)   38    ALT (SGPT)   67 (A)    WBC 5.50      Hemoglobin 15.9      Hematocrit 46.0      Platelets 214      Total Cholesterol  229 (A)     Triglycerides  151 (A)     HDL Cholesterol  49     LDL Cholesterol   153 (A)     PSA    0.814   (A) Abnormal value               Assessment and Plan   Problem List Items Addressed This Visit        Mental  Health    Alcohol dependence, daily use (HCC) (Chronic)    Relevant Medications    acamprosate (CAMPRAL) 333 MG EC tablet    Generalized anxiety disorder - Primary    Relevant Medications    acamprosate (CAMPRAL) 333 MG EC tablet        Discussed treatment options with patient.  Patient wants to continue the Campral 666 mg 3 times daily for alcohol use disorder.  Informed patient again that this medication is more for abstinence, and does not work as well if the patient is still drinking.  Discussed with patient what his ultimate goal was.  He stated that he wanted to decrease and/or quit his drinking.  Informed him that it is going to take work on his part more than the medication to achieve this.  Informed him that therapy would be a good option and that we have addiction counselors that could help him.  Patient is not willing to do that at this time.  Spoke with patient about some different plans that we could have to decrease the drinking.  Discussed with patient that not having alcohol in the house.  Patient was not ready to make that step yet.  Patient is trying to find things to do to occupy his time and thinks that we will help him achieve his goal.  Did discuss that step one of the Alcoholics Anonymous program was to admit that he was powerless over alcohol.  Patient did not know this.  Patient speaks about being driven and thinks he can do anything he sets his mind to.  Cautioned patient that that is now how this disease works and that he will probably need help outside of that to achieve this.  Patient is not ready for that yet.  Offered therapy service for patient when he was ready.  Patient verbalized understanding.  Again discussed alcohol withdrawal symptoms and what to look for.  Discussed with with patient about watching for any withdrawal type symptoms from stopping the alcohol.  Informed patient that if he noticed any tremors, increase in pulse, increase in temperature, increase in blood pressure,  or any type of seizure activity that he should go to the emergency room immediately.  Informed patient that this can be life-threatening.    TREATMENT PLAN/GOALS: Continue supportive psychotherapy efforts and medications as indicated. Treatment and medication options discussed during today's visit. Patient ackowledged and verbally consented to continue with current treatment plan and was educated on the importance of compliance with treatment and follow-up appointments.    DEPRESSION:  Patient screened positive for depression based on a PHQ-9 score of 0 on 12/21/2021. Follow-up recommendations include: Suicide Risk Assessment performed.       MEDICATION ISSUES:  We discussed risks, benefits, and side effects of the above medications and the patient was agreeable with the plan. Patient was educated on the importance of compliance with treatment and follow-up appointments.  Patient is agreeable to call the office with any worsening of symptoms or onset of side effects. Patient is agreeable to call 911 or go to the nearest ER should he/she begin having SI/HI.      Counseled patient regarding multimodal approach with healthy nutrition, healthy sleep, regular physical activity, social activities, counseling, and medications.      Coping skills reviewed and encouraged positive framing of thoughts     Assisted patient in processing above session content; acknowledged and normalized patient’s thoughts, feelings, and concerns.  Applied  positive coping skills and behavior management in session.  Allowed patient to freely discuss issues without interruption or judgment. Provided safe, confidential environment to facilitate the development of positive therapeutic relationship and encourage open, honest communication. Assisted patient in identifying risk factors which would indicate the need for higher level of care including thoughts to harm self or others and/or self-harming behavior and encouraged patient to contact this  office, call 911, or present to the nearest emergency room should any of these events occur. Discussed crisis intervention services and means to access.     MEDS ORDERED DURING VISIT:  New Medications Ordered This Visit   Medications   • acamprosate (CAMPRAL) 333 MG EC tablet     Sig: Take 2 tablets by mouth 3 (Three) Times a Day.     Dispense:  180 tablet     Refill:  0       I spent 35 minutes caring for Randy on this date of service. This time includes time spent by me in the following activities:preparing for the visit, performing a medically appropriate examination and/or evaluation , counseling and educating the patient/family/caregiver, ordering medications, tests, or procedures and documenting information in the medical record    Follow Up   Return in about 4 weeks (around 1/18/2022) for Video visit.    Patient was given instructions and counseling regarding his condition or for health maintenance advice. Please see specific information pulled into the AVS if appropriate.         This document has been electronically signed by TANYA Garzon  December 21, 2021 14:22 EST    Part of this note may be an electronic transcription/translation of spoken language to printed text using the Dragon Dictation System.

## 2022-01-06 DIAGNOSIS — I10 UNCONTROLLED HYPERTENSION: ICD-10-CM

## 2022-01-06 RX ORDER — INDAPAMIDE 2.5 MG/1
2.5 TABLET, FILM COATED ORAL EVERY MORNING
Qty: 30 TABLET | Refills: 2 | Status: SHIPPED | OUTPATIENT
Start: 2022-01-06 | End: 2022-04-02

## 2022-01-17 RX ORDER — LOSARTAN POTASSIUM 100 MG/1
TABLET ORAL
Qty: 90 TABLET | Refills: 0 | Status: SHIPPED | OUTPATIENT
Start: 2022-01-17 | End: 2022-03-25

## 2022-02-04 ENCOUNTER — TELEPHONE (OUTPATIENT)
Dept: GASTROENTEROLOGY | Facility: CLINIC | Age: 62
End: 2022-02-04

## 2022-02-04 NOTE — TELEPHONE ENCOUNTER
PATIENT WOULD LIKE US TO CALL IN GENERIC [facility] TO [facility] ON [facility].  THANKS,  MARA MUÑOZ-HE DID NOT KNOW THE NAME OF THE RX.   [NS_DeliveryAttending1_OBGYN_ALL_OB_FT:[id number]],[NS_DeliveryAssist1_OBGYN_ALL_OB_FT:[id number]],[NS_DeliveryRN_OBGYN_ALL_OB_FT:[id number]]

## 2022-02-04 NOTE — TELEPHONE ENCOUNTER
CALLED PATIENT, RETURNING VCM WITH QUESTIONS REGARDING UPCOMING PROCEDURE. NO ANSWER, NO VCM WAS AVAILABLE.

## 2022-02-07 ENCOUNTER — TELEPHONE (OUTPATIENT)
Dept: GASTROENTEROLOGY | Facility: CLINIC | Age: 62
End: 2022-02-07

## 2022-02-07 ENCOUNTER — TELEMEDICINE (OUTPATIENT)
Dept: PSYCHIATRY | Facility: CLINIC | Age: 62
End: 2022-02-07

## 2022-02-07 DIAGNOSIS — F41.1 GENERALIZED ANXIETY DISORDER: Chronic | ICD-10-CM

## 2022-02-07 DIAGNOSIS — F10.20 ALCOHOL DEPENDENCE, DAILY USE: Primary | Chronic | ICD-10-CM

## 2022-02-07 PROCEDURE — 99214 OFFICE O/P EST MOD 30 MIN: CPT

## 2022-02-07 RX ORDER — ACAMPROSATE CALCIUM 333 MG/1
666 TABLET, DELAYED RELEASE ORAL 3 TIMES DAILY
Qty: 180 TABLET | Refills: 0 | Status: SHIPPED | OUTPATIENT
Start: 2022-02-07 | End: 2022-03-07 | Stop reason: SDUPTHER

## 2022-02-07 NOTE — PROGRESS NOTES
This provider is located at [place], [place]. The Patient is seen remotely using Video. Patient is being seen via telehealth and confirm that they are in a secure environment for this session. Patient is located in [facility] outside of his home. The patient's condition being diagnosed/treated is appropriate for telemedicine. Provider identified as Manuel Perez as well as credentials APRN MSN PMHNP-BC.   The client/patient gave consent to be seen remotely, and when consent is given they understand that the consent allows for patient identifiable information to be sent to a third party as needed.  They may refuse to be seen remotely at any time. The electronic data is encrypted and password protected, and the patient has been advised of the potential risks to privacy not withstanding such measures.    Chief Complaint  Alcohol Problem and Anxiety    Subjective        Randy Asif presents to [facility] for   History of Present Illness  Patient seen today for a follow-up visit for alcohol use and anxiety.  Patient states he was able to go 3 days without drinking any alcohol this past month.  States then he had a friend from [place], and that like to drink, and this caused the patient to drink.  States he was able to go every other day without alcohol for stretch of time.  Patient denies any symptoms of depression.  States his anxiety is still under control as the Campral has helped with his worry.  Patient is asking about any tools or suggestion this APRN has to help him stop his drinking.  He states his sleep and appetite have been good.  Patient reports that he is looking at retiring in the near future and wants to have a plan in place to help him with the free time that he will have.  Patient has been taking pottery and yoga classes to try to feel this time.  Patient denies any suicidal or homicidal ideation.  Denies any manic type symptoms.  Denies any auditory or  visual hallucinations.  Denies any withdrawal symptoms when not having alcohol.  Denies any paranoia.  Denies any side effect of the medication.  Objective   Vital Signs:   There were no vitals taken for this visit.      PHQ-9 Score:   PHQ-9 Total Score: 0     Mental Status Exam:   Hygiene:   good  Cooperation:  Cooperative  Eye Contact:  Good  Psychomotor Behavior:  Appropriate  Affect:  Full range  Mood: normal  Speech:  Normal  Thought Process:  Goal directed  Thought Content:  Normal  Suicidal:  None  Homicidal:  None  Hallucinations:  None  Delusion:  None  Memory:  Intact  Orientation:  Person, Place, Time and Situation  Reliability:  good  Insight:  Good  Judgement:  Good  Impulse Control:  Good  Physical/Medical Issues:  No      PHQ-9 Depression Screening  Little interest or pleasure in doing things? 0   Feeling down, depressed, or hopeless? 0   Trouble falling or staying asleep, or sleeping too much? 0   Feeling tired or having little energy? 0   Poor appetite or overeating? 0   Feeling bad about yourself - or that you are a failure or have let yourself or your family down? 0   Trouble concentrating on things, such as reading the newspaper or watching television? 0   Moving or speaking so slowly that other people could have noticed? Or the opposite - being so fidgety or restless that you have been moving around a lot more than usual? 0   Thoughts that you would be better off dead, or of hurting yourself in some way? 0   PHQ-9 Total Score 0   If you checked off any problems, how difficult have these problems made it for you to do your work, take care of things at home, or get along with other people? Not difficult at all    0    PILY 7 anxiety screening tool that patient filled out virtually reviewed by this APRN at today's encounter.    PROMIS scale screening tool that patient filled out virtually reviewed by this APRN at today's encounter.    Previous Provider notes and available records reviewed by this  APRN today.   Current Medications:   Current Outpatient Medications   Medication Sig Dispense Refill   • acamprosate (CAMPRAL) 333 MG EC tablet Take 2 tablets by mouth 3 (Three) Times a Day. 180 tablet 0   • carvedilol (COREG) 25 MG tablet TAKE ONE TABLET BY MOUTH TWICE A DAY WITH MEALS 60 tablet 2   • indapamide (LOZOL) 2.5 MG tablet Take 1 tablet by mouth Every Morning. 30 tablet 2   • lamoTRIgine (LaMICtal) 100 MG tablet Take 200 mg by mouth 2 (Two) Times a Day.     • losartan (COZAAR) 100 MG tablet TAKE ONE TABLET BY MOUTH DAILY 90 tablet 0   • sildenafil (REVATIO) 20 MG tablet TAKE FIVE TABLETS BY MOUTH DAILY AS NEEDED FOR ERECTILE DYSFUNCTION 120 tablet 3     No current facility-administered medications for this visit.       Physical Exam   Result Review :    The following data was reviewed by: TANYA Garzon on 02/07/2022:  Common labs    Common Labsle 4/16/21 4/16/21 4/16/21 4/16/21    0831 0831 0831 0831   Glucose   111 (A)    BUN   13    Creatinine   0.88    eGFR Non African Am   88    Sodium   137    Potassium   3.8    Chloride   96 (A)    Calcium   9.8    Albumin   4.60    Total Bilirubin   0.4    Alkaline Phosphatase   48    AST (SGOT)   38    ALT (SGPT)   67 (A)    WBC 5.50      Hemoglobin 15.9      Hematocrit 46.0      Platelets 214      Total Cholesterol  229 (A)     Triglycerides  151 (A)     HDL Cholesterol  49     LDL Cholesterol   153 (A)     PSA    0.814   (A) Abnormal value                 Assessment and Plan   Problem List Items Addressed This Visit        Mental Health    Alcohol dependence, daily use (HCC) - Primary (Chronic)    Relevant Medications    acamprosate (CAMPRAL) 333 MG EC tablet    Generalized anxiety disorder    Relevant Medications    acamprosate (CAMPRAL) 333 MG EC tablet        Patient inquired if there were any other tools that this APRN could provide.  Discussed with patient that starting therapy would be a great tool for him to use with trying to come off alcohol.   Also informed him that some type of groups or IOP could also be beneficial as well.  Patient states he is interested in doing so.  So, we will coordinate a therapy appointment for him to help with his alcohol use and also to help coordinate additional treatment as the patient desires.  Continue Campral 333 mg take 2 tablets 3 times daily for alcohol use disorder.  Encourage patient to abstain from drinking.  Praised patient for the progress he has made thus far.  We will see patient again in 4 weeks to reassess.    TREATMENT PLAN/GOALS: Continue supportive psychotherapy efforts and medications as indicated. Treatment and medication options discussed during today's visit. Patient ackowledged and verbally consented to continue with current treatment plan and was educated on the importance of compliance with treatment and follow-up appointments.    DEPRESSION:  Patient screened positive for depression based on a PHQ-9 score of 0 on 2/7/2022. Follow-up recommendations include: Prescribed antidepressant medication treatment and Referral to Social Work.       MEDICATION ISSUES:  We discussed risks, benefits, and side effects of the above medications and the patient was agreeable with the plan. Patient was educated on the importance of compliance with treatment and follow-up appointments.  Patient is agreeable to call the office with any worsening of symptoms or onset of side effects. Patient is agreeable to call 911 or go to the nearest ER should he/she begin having SI/HI.      Counseled patient regarding multimodal approach with healthy nutrition, healthy sleep, regular physical activity, social activities, counseling, and medications.      Coping skills reviewed and encouraged positive framing of thoughts     Assisted patient in processing above session content; acknowledged and normalized patient’s thoughts, feelings, and concerns.  Applied  positive coping skills and behavior management in session.  Allowed patient to  freely discuss issues without interruption or judgment. Provided safe, confidential environment to facilitate the development of positive therapeutic relationship and encourage open, honest communication. Assisted patient in identifying risk factors which would indicate the need for higher level of care including thoughts to harm self or others and/or self-harming behavior and encouraged patient to contact this office, call 911, or present to the nearest emergency room should any of these events occur. Discussed crisis intervention services and means to access.     MEDS ORDERED DURING VISIT:  New Medications Ordered This Visit   Medications   • acamprosate (CAMPRAL) 333 MG EC tablet     Sig: Take 2 tablets by mouth 3 (Three) Times a Day.     Dispense:  180 tablet     Refill:  0         Follow Up   Return in about 4 weeks (around 3/7/2022) for Video visit.    Patient was given instructions and counseling regarding his condition or for health maintenance advice. Please see specific information pulled into the AVS if appropriate.         This document has been electronically signed by TANYA Garzon  February 7, 2022 17:34 EST    Part of this note may be an electronic transcription/translation of spoken language to printed text using the Dragon Dictation System.

## 2022-02-08 ENCOUNTER — OUTSIDE FACILITY SERVICE (OUTPATIENT)
Dept: GASTROENTEROLOGY | Facility: CLINIC | Age: 62
End: 2022-02-08

## 2022-02-08 PROCEDURE — 88305 TISSUE EXAM BY PATHOLOGIST: CPT | Performed by: INTERNAL MEDICINE

## 2022-02-08 PROCEDURE — 45385 COLONOSCOPY W/LESION REMOVAL: CPT | Performed by: INTERNAL MEDICINE

## 2022-02-09 ENCOUNTER — LAB REQUISITION (OUTPATIENT)
Dept: LAB | Facility: HOSPITAL | Age: 62
End: 2022-02-09

## 2022-02-09 DIAGNOSIS — Z12.11 ENCOUNTER FOR SCREENING FOR MALIGNANT NEOPLASM OF COLON: ICD-10-CM

## 2022-02-09 DIAGNOSIS — D12.2 BENIGN NEOPLASM OF ASCENDING COLON: ICD-10-CM

## 2022-02-09 DIAGNOSIS — D12.3 BENIGN NEOPLASM OF TRANSVERSE COLON: ICD-10-CM

## 2022-02-09 DIAGNOSIS — K57.30 DIVERTICULOSIS OF LARGE INTESTINE WITHOUT PERFORATION OR ABSCESS WITHOUT BLEEDING: ICD-10-CM

## 2022-02-10 ENCOUNTER — TELEPHONE (OUTPATIENT)
Dept: PSYCHIATRY | Facility: CLINIC | Age: 62
End: 2022-02-10

## 2022-02-10 LAB
CYTO UR: NORMAL
LAB AP CASE REPORT: NORMAL
LAB AP CLINICAL INFORMATION: NORMAL
LAB AP DIAGNOSIS COMMENT: NORMAL
PATH REPORT.FINAL DX SPEC: NORMAL
PATH REPORT.GROSS SPEC: NORMAL

## 2022-02-10 NOTE — TELEPHONE ENCOUNTER
----- Message from TANYA Garzon sent at 2/7/2022  5:30 PM EST -----  Patient would like a therapy appointment.  Thanks

## 2022-02-21 DIAGNOSIS — I10 ESSENTIAL HYPERTENSION: ICD-10-CM

## 2022-02-21 RX ORDER — CARVEDILOL 25 MG/1
25 TABLET ORAL 2 TIMES DAILY WITH MEALS
Qty: 60 TABLET | Refills: 1 | Status: SHIPPED | OUTPATIENT
Start: 2022-02-21 | End: 2022-04-20

## 2022-03-07 ENCOUNTER — TELEMEDICINE (OUTPATIENT)
Dept: PSYCHIATRY | Facility: CLINIC | Age: 62
End: 2022-03-07

## 2022-03-07 DIAGNOSIS — F41.1 GENERALIZED ANXIETY DISORDER: ICD-10-CM

## 2022-03-07 DIAGNOSIS — F10.20 ALCOHOL DEPENDENCE, DAILY USE: Primary | ICD-10-CM

## 2022-03-07 PROCEDURE — 99213 OFFICE O/P EST LOW 20 MIN: CPT

## 2022-03-07 RX ORDER — ACAMPROSATE CALCIUM 333 MG/1
666 TABLET, DELAYED RELEASE ORAL 3 TIMES DAILY
Qty: 180 TABLET | Refills: 0 | Status: SHIPPED | OUTPATIENT
Start: 2022-03-07 | End: 2022-04-04 | Stop reason: SDUPTHER

## 2022-03-07 NOTE — PROGRESS NOTES
This provider is located at [place], [place]. The Patient is seen remotely using Video. Patient is being seen via telehealth and confirm that they are in a secure environment for this session. Patient is located in [place], [place] at his home. The patient's condition being diagnosed/treated is appropriate for telemedicine. Provider identified as Manuel Perez as well as credentials APRN MSN PMHNP-BC.   The client/patient gave consent to be seen remotely, and when consent is given they understand that the consent allows for patient identifiable information to be sent to a third party as needed.  They may refuse to be seen remotely at any time. The electronic data is encrypted and password protected, and the patient has been advised of the potential risks to privacy not withstanding such measures.    Chief Complaint  Alcohol Problem and Anxiety    Subjective        Randy Asif presents to [facility] for   History of Present Illness  Patient seen today for follow-up visit for alcohol abuse and anxiety.  Patient reports that he was able to go Friday, Monday, Wednesday, and the next Friday without any alcohol.  Patient states on the days that he does drink he still drinks 6 shots of vodka daily.  Patient states he tries to keep his time occupied as downtime seems to contribute to his desire to drink.  States he continues to do staying glass and take classes for that.  He states that he has been contemplating alf, but knows he needs to get his drinking under control before doing so.  States his nephew is joining his practice and that is also been stressful.  States the nephew agreed to share expenses, but has not paid any money yet.  States he also has a plan to go to [place] this summer to spend time with his children.  Patient states he would like to stop his drinking before that time as he does not want that to be part of his trip.  Patient denies any depression  currently.  States he does have some anxiety but he feels that it is normal anxiety due to his situation.  States he continues to take Campral 660 mg 3 times daily.  Denies any side effects to the medication.  Denies any auditory or visual hallucinations.  Denies any suicidal or homicidal ideation.  Denies any paranoia.  Denies any hypomanic type symptoms.  Objective   Vital Signs:   There were no vitals taken for this visit.      PHQ-9 Score:   PHQ-9 Total Score:       Mental Status Exam:   Hygiene:   good  Cooperation:  Cooperative  Eye Contact:  Good  Psychomotor Behavior:  Appropriate  Affect:  Full range  Mood: normal  Speech:  Normal  Thought Process:  Goal directed  Thought Content:  Normal  Suicidal:  None  Homicidal:  None  Hallucinations:  None  Delusion:  None  Memory:  Intact  Orientation:  Person, Place, Time and Situation  Reliability:  good  Insight:  Good  Judgement:  Good  Impulse Control:  Good  Physical/Medical Issues:  No      PHQ-9 Depression Screening  Little interest or pleasure in doing things?     Feeling down, depressed, or hopeless?     Trouble falling or staying asleep, or sleeping too much?     Feeling tired or having little energy?     Poor appetite or overeating?     Feeling bad about yourself - or that you are a failure or have let yourself or your family down?     Trouble concentrating on things, such as reading the newspaper or watching television?     Moving or speaking so slowly that other people could have noticed? Or the opposite - being so fidgety or restless that you have been moving around a lot more than usual?     Thoughts that you would be better off dead, or of hurting yourself in some way?     PHQ-9 Total Score     If you checked off any problems, how difficult have these problems made it for you to do your work, take care of things at home, or get along with other people?       PHQ-9 Total Score:      PILY 7 anxiety screening tool that patient filled out virtually  reviewed by this APRN at today's encounter.    PROMIS scale screening tool that patient filled out virtually reviewed by this APRN at today's encounter.    Previous Provider notes and available records reviewed by this APRN today.   Current Medications:   Current Outpatient Medications   Medication Sig Dispense Refill   • acamprosate (CAMPRAL) 333 MG EC tablet Take 2 tablets by mouth 3 (Three) Times a Day. 180 tablet 0   • carvedilol (COREG) 25 MG tablet Take 1 tablet by mouth 2 (Two) Times a Day With Meals. 60 tablet 1   • indapamide (LOZOL) 2.5 MG tablet Take 1 tablet by mouth Every Morning. 30 tablet 2   • lamoTRIgine (LaMICtal) 100 MG tablet Take 200 mg by mouth 2 (Two) Times a Day.     • losartan (COZAAR) 100 MG tablet TAKE ONE TABLET BY MOUTH DAILY 90 tablet 0   • sildenafil (REVATIO) 20 MG tablet TAKE FIVE TABLETS BY MOUTH DAILY AS NEEDED FOR ERECTILE DYSFUNCTION 120 tablet 3     No current facility-administered medications for this visit.       Physical Exam   Result Review :    The following data was reviewed by: TANYA Garzon on 03/07/2022:  Common labs    Common Labsle 4/16/21 4/16/21 4/16/21 4/16/21    0831 0831 0831 0831   Glucose   111 (A)    BUN   13    Creatinine   0.88    eGFR Non African Am   88    Sodium   137    Potassium   3.8    Chloride   96 (A)    Calcium   9.8    Albumin   4.60    Total Bilirubin   0.4    Alkaline Phosphatase   48    AST (SGOT)   38    ALT (SGPT)   67 (A)    WBC 5.50      Hemoglobin 15.9      Hematocrit 46.0      Platelets 214      Total Cholesterol  229 (A)     Triglycerides  151 (A)     HDL Cholesterol  49     LDL Cholesterol   153 (A)     PSA    0.814   (A) Abnormal value               Assessment and Plan   Problem List Items Addressed This Visit        Mental Health    Alcohol dependence, daily use (HCC) - Primary (Chronic)    Relevant Medications    acamprosate (CAMPRAL) 333 MG EC tablet    Generalized anxiety disorder    Relevant Medications    acamprosate  (CAMPRAL) 333 MG EC tablet        Praised the patient for the progress he is made.  At the beginning of treatment, patient was drinking 6 shots of vodka daily.  Being able to go every other day for a week is a great improvement.  Encourage patient to continue to make progress.  Again informed him that Campral is best used in abstinence.  Patient verbalized understanding and agreed.  Encouraged patient to limit his drinking and avoid places that would make him want to drink.  Patient has a therapy appointment coming up next week, and he is excited about that.  Patient would like to work on coping skills and ways to help reduce his drinking.  Continue Campral 666 mg 3 times daily for alcohol abuse.  We will see patient again in 4 weeks to reassess.    TREATMENT PLAN/GOALS: Continue supportive psychotherapy efforts and medications as indicated. Treatment and medication options discussed during today's visit. Patient ackowledged and verbally consented to continue with current treatment plan and was educated on the importance of compliance with treatment and follow-up appointments.    DEPRESSION:  Patient screened positive for depression based on a PHQ-9 score of  on . Follow-up recommendations include: Referral to Social Work.       MEDICATION ISSUES:  We discussed risks, benefits, and side effects of the above medications and the patient was agreeable with the plan. Patient was educated on the importance of compliance with treatment and follow-up appointments.  Patient is agreeable to call the office with any worsening of symptoms or onset of side effects. Patient is agreeable to call 911 or go to the nearest ER should he/she begin having SI/HI.      Counseled patient regarding multimodal approach with healthy nutrition, healthy sleep, regular physical activity, social activities, counseling, and medications.      Coping skills reviewed and encouraged positive framing of thoughts     Assisted patient in processing  above session content; acknowledged and normalized patient’s thoughts, feelings, and concerns.  Applied  positive coping skills and behavior management in session.  Allowed patient to freely discuss issues without interruption or judgment. Provided safe, confidential environment to facilitate the development of positive therapeutic relationship and encourage open, honest communication. Assisted patient in identifying risk factors which would indicate the need for higher level of care including thoughts to harm self or others and/or self-harming behavior and encouraged patient to contact this office, call 911, or present to the nearest emergency room should any of these events occur. Discussed crisis intervention services and means to access.     MEDS ORDERED DURING VISIT:  New Medications Ordered This Visit   Medications   • acamprosate (CAMPRAL) 333 MG EC tablet     Sig: Take 2 tablets by mouth 3 (Three) Times a Day.     Dispense:  180 tablet     Refill:  0         Follow Up   Return in about 4 weeks (around 4/4/2022) for Video visit.    Patient was given instructions and counseling regarding his condition or for health maintenance advice. Please see specific information pulled into the AVS if appropriate.         This document has been electronically signed by TANYA Garzon  March 7, 2022 09:56 EST    Part of this note may be an electronic transcription/translation of spoken language to printed text using the Dragon Dictation System.

## 2022-03-16 ENCOUNTER — TELEMEDICINE (OUTPATIENT)
Dept: PSYCHIATRY | Facility: CLINIC | Age: 62
End: 2022-03-16

## 2022-03-16 DIAGNOSIS — F10.20 ALCOHOL DEPENDENCE, DAILY USE: Primary | ICD-10-CM

## 2022-03-16 PROCEDURE — 90791 PSYCH DIAGNOSTIC EVALUATION: CPT | Performed by: SOCIAL WORKER

## 2022-03-25 ENCOUNTER — OFFICE VISIT (OUTPATIENT)
Dept: FAMILY MEDICINE CLINIC | Facility: CLINIC | Age: 62
End: 2022-03-25

## 2022-03-25 VITALS
SYSTOLIC BLOOD PRESSURE: 110 MMHG | BODY MASS INDEX: 27.76 KG/M2 | HEART RATE: 68 BPM | HEIGHT: 76 IN | WEIGHT: 228 LBS | OXYGEN SATURATION: 96 % | TEMPERATURE: 97 F | DIASTOLIC BLOOD PRESSURE: 74 MMHG | RESPIRATION RATE: 22 BRPM

## 2022-03-25 DIAGNOSIS — L23.7 ALLERGIC CONTACT DERMATITIS DUE TO PLANTS, EXCEPT FOOD: Primary | ICD-10-CM

## 2022-03-25 DIAGNOSIS — I10 PRIMARY HYPERTENSION: Chronic | ICD-10-CM

## 2022-03-25 DIAGNOSIS — R94.31 ABNORMAL ECG: ICD-10-CM

## 2022-03-25 DIAGNOSIS — Z82.49 FAMILY HISTORY OF HEART DISEASE: ICD-10-CM

## 2022-03-25 DIAGNOSIS — I49.3 PVC'S (PREMATURE VENTRICULAR CONTRACTIONS): ICD-10-CM

## 2022-03-25 PROBLEM — F10.20 ALCOHOL DEPENDENCE, DAILY USE: Chronic | Status: RESOLVED | Noted: 2021-10-31 | Resolved: 2022-03-25

## 2022-03-25 PROCEDURE — 99214 OFFICE O/P EST MOD 30 MIN: CPT | Performed by: NURSE PRACTITIONER

## 2022-03-25 PROCEDURE — 93000 ELECTROCARDIOGRAM COMPLETE: CPT | Performed by: NURSE PRACTITIONER

## 2022-03-25 RX ORDER — CLOBETASOL PROPIONATE 0.5 MG/G
CREAM TOPICAL
Qty: 30 G | Refills: 0 | Status: SHIPPED | OUTPATIENT
Start: 2022-03-25 | End: 2022-09-13 | Stop reason: SDUPTHER

## 2022-03-25 RX ORDER — LOSARTAN POTASSIUM 50 MG/1
50 TABLET ORAL DAILY
Qty: 30 TABLET | Refills: 5
Start: 2022-03-25 | End: 2022-09-13

## 2022-03-25 RX ORDER — DICLOFENAC SODIUM 75 MG/1
TABLET, DELAYED RELEASE ORAL
COMMUNITY
Start: 2022-01-04 | End: 2022-04-08

## 2022-03-25 NOTE — PROGRESS NOTES
Follow Up Office Note     Patient Name: Randy Asif  : 1960   MRN: [id number]     Chief Complaint:    Chief Complaint   Patient presents with   • Rash   • Hypertension       History of Present Illness: Randy Asif is a 62 y.o. male who presents today with complaint of rash and itching on bilateral lower legs times several days.  Patient states that he frequently goes for walks and will sometimes cuts through weeded areas as a shortcut.  Patient believes he was exposed to poison ivy.  He states that he is very sensitive to poison ivy and will get it a couple of times every year.  Additionally, patient presents for reevaluation management of chronic hypertension.  His blood pressure has been stable and controlled on current medications. Patient is concerned about his cardiac health due to his family history of heart disease as well as his personal history of PVCs. He is requesting a cardiac workup today.      Subjective      Review of Systems:   Review of Systems   Constitutional: Negative for activity change, appetite change, chills, diaphoresis, fatigue and fever.   HENT: Negative.  Negative for congestion, rhinorrhea and sore throat.    Eyes: Negative for pain.   Respiratory: Negative for cough, chest tightness, shortness of breath, wheezing and stridor.    Cardiovascular: Positive for palpitations (occasional). Negative for chest pain and leg swelling.   Gastrointestinal: Negative.  Negative for diarrhea and vomiting.   Skin: Positive for rash.   Neurological: Negative.         Past Medical History:   Past Medical History:   Diagnosis Date   • Abnormal finding on MRI of brain    • Asthmatic bronchitis    • Brain cancer (HCC)    • Elevated AST (SGOT)    • Elevated glucose    • Seizures (HCC)    • Shingles          Medications:     Current Outpatient Medications:   •  acamprosate (CAMPRAL) 333 MG EC tablet, Take 2 tablets by mouth 3 (Three) Times a Day., Disp: 180 tablet, Rfl: 0  •  carvedilol  "(COREG) 25 MG tablet, Take 1 tablet by mouth 2 (Two) Times a Day With Meals., Disp: 60 tablet, Rfl: 1  •  indapamide (LOZOL) 2.5 MG tablet, Take 1 tablet by mouth Every Morning., Disp: 30 tablet, Rfl: 2  •  lamoTRIgine (LaMICtal) 100 MG tablet, Take 200 mg by mouth 2 (Two) Times a Day., Disp: , Rfl:   •  losartan (COZAAR) 50 MG tablet, Take 1 tablet by mouth Daily., Disp: 30 tablet, Rfl: 5  •  sildenafil (REVATIO) 20 MG tablet, TAKE FIVE TABLETS BY MOUTH DAILY AS NEEDED FOR ERECTILE DYSFUNCTION, Disp: 120 tablet, Rfl: 3  •  clobetasol (TEMOVATE) 0.05 % cream, Apply to affected areas in thin layer BID, Disp: 30 g, Rfl: 0  •  diclofenac (VOLTAREN) 75 MG EC tablet, , Disp: , Rfl:     Allergies:   Allergies   Allergen Reactions   • Indapamide Myalgia     Muscle cramps     • Lisinopril Cough   • Oxycodone-Acetaminophen Anxiety   • Percocet [Oxycodone-Acetaminophen] Anxiety         Objective     Physical Exam:  Vital Signs:   Vitals:    03/25/22 0850   BP: 110/74   Pulse: 68   Resp: 22   Temp: 97 °F (36.1 °C)   SpO2: 96%   Weight: 103 kg (228 lb)   Height: 193 cm (76\")   PainSc: 0-No pain     Body mass index is 27.75 kg/m².     Physical Exam  Vitals and nursing note reviewed. Exam conducted with a chaperone present.   Constitutional:       General: He is not in acute distress.     Appearance: Normal appearance. He is well-developed and well-groomed. He is not ill-appearing, toxic-appearing or diaphoretic.   HENT:      Head: Normocephalic and atraumatic.      Mouth/Throat:      Lips: Pink.      Pharynx: Uvula midline.   Neck:      Vascular: No carotid bruit.   Cardiovascular:      Rate and Rhythm: Normal rate. Rhythm irregular.      Pulses: Normal pulses.      Heart sounds: Normal heart sounds, S1 normal and S2 normal. No murmur heard.  Pulmonary:      Effort: Pulmonary effort is normal. No respiratory distress.      Breath sounds: Normal breath sounds.   Musculoskeletal:      Cervical back: Normal range of motion and neck " supple.      Right lower leg: No edema.      Left lower leg: No edema.   Skin:     General: Skin is warm and dry.      Findings: Rash present.      Comments: Multiple erythematous papular lesions on BLE. Patient reports lesions were initially weeping, but have started to dry up now. He is still c/o pruritis.    Neurological:      General: No focal deficit present.      Mental Status: He is alert and oriented to person, place, and time. Mental status is at baseline.   Psychiatric:         Attention and Perception: Attention and perception normal.         Mood and Affect: Mood and affect normal.         Speech: Speech normal.         Behavior: Behavior normal. Behavior is cooperative.         Thought Content: Thought content normal.         Cognition and Memory: Cognition and memory normal.         Judgment: Judgment normal.         Assessment / Plan      Assessment/Plan:   Diagnoses and all orders for this visit:    1. Allergic contact dermatitis due to plants, except food (Primary)  -     clobetasol (TEMOVATE) 0.05 % cream; Apply to affected areas in thin layer BID  Dispense: 30 g; Refill: 0    2. Primary hypertension  Assessment & Plan:  Hypertension is improving with treatment.  Continue current treatment regimen.  Dietary sodium restriction.  Regular aerobic exercise.  Continue current medications.  Ambulatory blood pressure monitoring.  Blood pressure will be reassessed at the next regular appointment.    Orders:  -     ECG 12 Lead  -     Ambulatory Referral to [facility] [place] - ROJAS  -     losartan (COZAAR) 50 MG tablet; Take 1 tablet by mouth Daily.  Dispense: 30 tablet; Refill: 5    3. PVC's (premature ventricular contractions)  -     ECG 12 Lead  -     Ambulatory Referral to [facility] [place] - ROJAS    4. Abnormal ECG  -     Ambulatory Referral to [facility] [place] - ROJAS    5. Family history of heart disease  -     ECG 12 Lead  -     Ambulatory Referral to  [facility] [place] - ROJAS       ECG 12 Lead    Date/Time: 3/25/2022 9:12 AM  Performed by: Navya Mora APRN  Authorized by: Navya Mora APRN   Comparison: compared with previous ECG from 11/5/2020  Comparison to previous ECG: New onset first degree AV block when compared to previous ECG  Rhythm: sinus rhythm  Rate: normal  BPM: 63  Conduction: 1st degree AV block  QRS axis: normal  Other: no other findings    Clinical impression: abnormal EKG            Follow Up:   PRN and at next scheduled appointment(s) with PCP.    Discussed the nature of the medical condition(s) risks, complications, implications, management, safe and proper use of medications. Encouraged medication compliance, and keeping scheduled follow up appointments with me and any other providers.      RTC if symptoms fail to improve, to ER if symptoms worsen.      TANYA Blair  [facility] [place]

## 2022-03-26 PROBLEM — I49.3 PVC'S (PREMATURE VENTRICULAR CONTRACTIONS): Status: ACTIVE | Noted: 2022-03-26

## 2022-03-26 PROBLEM — R94.31 ABNORMAL ECG: Status: ACTIVE | Noted: 2022-03-26

## 2022-03-26 PROBLEM — Z82.49 FAMILY HISTORY OF HEART DISEASE: Status: ACTIVE | Noted: 2022-03-26

## 2022-03-26 PROBLEM — I49.3 PVC'S (PREMATURE VENTRICULAR CONTRACTIONS): Chronic | Status: ACTIVE | Noted: 2022-03-26

## 2022-04-02 DIAGNOSIS — I10 UNCONTROLLED HYPERTENSION: ICD-10-CM

## 2022-04-02 RX ORDER — INDAPAMIDE 2.5 MG/1
TABLET, FILM COATED ORAL
Qty: 90 TABLET | Refills: 0 | Status: SHIPPED | OUTPATIENT
Start: 2022-04-02 | End: 2022-06-27

## 2022-04-04 ENCOUNTER — TELEMEDICINE (OUTPATIENT)
Dept: PSYCHIATRY | Facility: CLINIC | Age: 62
End: 2022-04-04

## 2022-04-04 DIAGNOSIS — F10.20 ALCOHOL DEPENDENCE, DAILY USE: ICD-10-CM

## 2022-04-04 PROCEDURE — 99213 OFFICE O/P EST LOW 20 MIN: CPT

## 2022-04-04 RX ORDER — ACAMPROSATE CALCIUM 333 MG/1
666 TABLET, DELAYED RELEASE ORAL 3 TIMES DAILY
Qty: 180 TABLET | Refills: 0 | Status: SHIPPED | OUTPATIENT
Start: 2022-04-04 | End: 2022-05-18

## 2022-04-04 NOTE — PROGRESS NOTES
This provider is located at [place], [place]. The Patient is seen remotely using Video. Patient is being seen via telehealth and confirm that they are in a secure environment for this session. Patient is located in [facility] outside of his home. The patient's condition being diagnosed/treated is appropriate for telemedicine. Provider identified as Manuel Perez as well as credentials APRN MSN PMHNP-BC.   The client/patient gave consent to be seen remotely, and when consent is given they understand that the consent allows for patient identifiable information to be sent to a third party as needed.  They may refuse to be seen remotely at any time. The electronic data is encrypted and password protected, and the patient has been advised of the potential risks to privacy not withstanding such measures.    Chief Complaint  Alcohol Problem    Subjective        Randy Asif presents to [facility] for   History of Present Illness  Patient seen today for follow-up visit for alcohol abuse.  Patient reports that he has been able to go 3 days in row without drinking anything and he is proud of that.  States he has hopes of stretching out to 4 to 5 days at a time with no drinking.  Identifies his relapse triggers as going out to eat with his family.  Gives an example of going to a restaurant and having a drink and his sister and encouraging him to drink more.  States his sister said she will drive him home, but the sister ended up drinking 2 bottles of wine during that occasion as well.  Patient states when he does drink he has woke up in [place] and checked his breathalyzer and its 0.00.  States his sleep and appetite are good.  Denies any depression or anxiety currently.  Denies any manic type symptoms.  Denies any paranoia.  Denies any auditory or visual hallucinations.  States he continues to take the Campral and denies any side effects from it.  Objective   Vital Signs:   There  were no vitals taken for this visit.        Mental Status Exam:   Hygiene:   good  Cooperation:  Cooperative  Eye Contact:  Good  Psychomotor Behavior:  Appropriate  Affect:  Full range  Mood: normal  Speech:  Normal  Thought Process:  Goal directed  Thought Content:  Normal  Suicidal:  None  Homicidal:  None  Hallucinations:  None  Delusion:  None  Memory:  Intact  Orientation:  Person, Place, Time and Situation  Reliability:  good  Insight:  Good  Judgement:  Good  Impulse Control:  Good  Physical/Medical Issues:  No      PHQ-2 Depression Screening  Little interest or pleasure in doing things? 0-->not at all   Feeling down, depressed, or hopeless? 0-->not at all   PHQ-2 Total Score 0     PILY 7 anxiety screening tool that patient filled out virtually reviewed by this APRN at today's encounter.    PROMIS scale screening tool that patient filled out virtually reviewed by this APRN at today's encounter.    Previous Provider notes and available records reviewed by this APRN today.   Current Medications:   Current Outpatient Medications   Medication Sig Dispense Refill   • acamprosate (CAMPRAL) 333 MG EC tablet Take 2 tablets by mouth 3 (Three) Times a Day. 180 tablet 0   • carvedilol (COREG) 25 MG tablet Take 1 tablet by mouth 2 (Two) Times a Day With Meals. 60 tablet 1   • clobetasol (TEMOVATE) 0.05 % cream Apply to affected areas in thin layer BID 30 g 0   • diclofenac (VOLTAREN) 75 MG EC tablet      • indapamide (LOZOL) 2.5 MG tablet TAKE ONE TABLET BY MOUTH EVERY MORNING 90 tablet 0   • lamoTRIgine (LaMICtal) 100 MG tablet Take 200 mg by mouth 2 (Two) Times a Day.     • losartan (COZAAR) 50 MG tablet Take 1 tablet by mouth Daily. 30 tablet 5   • sildenafil (REVATIO) 20 MG tablet TAKE FIVE TABLETS BY MOUTH DAILY AS NEEDED FOR ERECTILE DYSFUNCTION 120 tablet 3     No current facility-administered medications for this visit.       Physical Exam   Result Review :    The following data was reviewed by: Manuel Perez  APRN on 04/04/2022:  Common labs    Common Labsle 4/16/21 4/16/21 4/16/21 4/16/21    0831 0831 0831 0831   Glucose   111 (A)    BUN   13    Creatinine   0.88    eGFR Non African Am   88    Sodium   137    Potassium   3.8    Chloride   96 (A)    Calcium   9.8    Albumin   4.60    Total Bilirubin   0.4    Alkaline Phosphatase   48    AST (SGOT)   38    ALT (SGPT)   67 (A)    WBC 5.50      Hemoglobin 15.9      Hematocrit 46.0      Platelets 214      Total Cholesterol  229 (A)     Triglycerides  151 (A)     HDL Cholesterol  49     LDL Cholesterol   153 (A)     PSA    0.814   (A) Abnormal value               Assessment and Plan   Problem List Items Addressed This Visit    None     Visit Diagnoses     Alcohol dependence, daily use (HCC)        Relevant Medications    acamprosate (CAMPRAL) 333 MG EC tablet        Patient was able to go 3 days in a row without drinking.  Praised patient for his progress.  Patient would like continue Campral 666 mg 3 times daily for alcohol abuse.  Patient is excited to get started with Rin Renteria LCSW with regular appointment starting in May.  Continue to encourage patient to decrease his drinking and and discussed that his triggers seem to be eating out and drinking with his family.  Patient agreed.  We will see patient again in 4 weeks to reassess.    TREATMENT PLAN/GOALS: Continue supportive psychotherapy efforts and medications as indicated. Treatment and medication options discussed during today's visit. Patient ackowledged and verbally consented to continue with current treatment plan and was educated on the importance of compliance with treatment and follow-up appointments.    DEPRESSION:  Patient screened positive for depression based on a PHQ-9 score of  on . Follow-up recommendations include: Suicide Risk Assessment performed.       MEDICATION ISSUES:  We discussed risks, benefits, and side effects of the above medications and the patient was agreeable with the plan. Patient  was educated on the importance of compliance with treatment and follow-up appointments.  Patient is agreeable to call the office with any worsening of symptoms or onset of side effects. Patient is agreeable to call 911 or go to the nearest ER should he/she begin having SI/HI.      Counseled patient regarding multimodal approach with healthy nutrition, healthy sleep, regular physical activity, social activities, counseling, and medications.      Coping skills reviewed and encouraged positive framing of thoughts     Assisted patient in processing above session content; acknowledged and normalized patient’s thoughts, feelings, and concerns.  Applied  positive coping skills and behavior management in session.  Allowed patient to freely discuss issues without interruption or judgment. Provided safe, confidential environment to facilitate the development of positive therapeutic relationship and encourage open, honest communication. Assisted patient in identifying risk factors which would indicate the need for higher level of care including thoughts to harm self or others and/or self-harming behavior and encouraged patient to contact this office, call 911, or present to the nearest emergency room should any of these events occur. Discussed crisis intervention services and means to access.     MEDS ORDERED DURING VISIT:  New Medications Ordered This Visit   Medications   • acamprosate (CAMPRAL) 333 MG EC tablet     Sig: Take 2 tablets by mouth 3 (Three) Times a Day.     Dispense:  180 tablet     Refill:  0         Follow Up   Return in about 4 weeks (around 5/2/2022) for Video visit.    Patient was given instructions and counseling regarding his condition or for health maintenance advice. Please see specific information pulled into the AVS if appropriate.         This document has been electronically signed by TANYA Garzon  April 4, 2022 08:25 EDT    Part of this note may be an electronic transcription/translation  of spoken language to printed text using the Dragon Dictation System.

## 2022-04-07 NOTE — PROGRESS NOTES
"[facility]    Chief Complaint  Establish Care (PVCs) and Abnormal ECG    Subjective    History of Present Illness {CC  Problem List  Visit  Diagnosis   Encounters  Notes  Medications  Labs  Result Review Imaging  Media :23}     Randy Asif is a 62 y.o. male with HTN, PVCs, malignant brain neoplasm, seizures who presents today for evaluation of family history of heart disease. He reports that walks 5 miles a day and swims at the gym 3 times a week in preparation for a stress test. He says he is here because he wants a stress test for prevention because of his father's history. His father had a MI at age 62. He reports a hx of PVCs when he was drinking too much caffeine and had a cardiac evaluation about 10 years ago that was reportedly normal. No hx of LHC. He notes some intermittent palpitations, but he is not sure if it is just gas. He says it is not often. He notes some KEY after climbing a couple of flights of stairs but recovers very quickly       Cardiac risks: HTN, hyperlipidemia, gender, age, family hx (father had first MI at 62)  Objective     Vital Signs:   Vitals:    04/08/22 0855 04/08/22 0857 04/08/22 0858   BP: 129/86 127/84 133/86   BP Location: Right arm Left arm Left arm   Patient Position: Sitting Sitting Sitting   Cuff Size: Adult Adult Adult   Pulse: 66 67 69   Resp:   18   Temp: 97.1 °F (36.2 °C) 97.1 °F (36.2 °C) 97.1 °F (36.2 °C)   TempSrc: Temporal Temporal Temporal   SpO2: 99% 97% 97%   Weight:   104 kg (228 lb 8 oz)   Height:   193 cm (76\")     Body mass index is 27.81 kg/m².  Physical Exam  Vitals reviewed.   Constitutional:       Appearance: Normal appearance.   HENT:      Head: Normocephalic.   Neck:      Vascular: No carotid bruit.   Cardiovascular:      Rate and Rhythm: Normal rate and regular rhythm.      Pulses: Normal pulses.      Heart sounds: Normal heart sounds, S1 normal and S2 normal. No murmur heard.  Pulmonary:      " Effort: Pulmonary effort is normal. No respiratory distress.      Breath sounds: Normal breath sounds.   Chest:      Chest wall: No tenderness.   Abdominal:      General: Abdomen is flat.      Palpations: Abdomen is soft.   Musculoskeletal:      Cervical back: Neck supple.      Right lower leg: No edema.      Left lower leg: No edema.   Skin:     General: Skin is warm and dry.   Neurological:      General: No focal deficit present.      Mental Status: He is alert and oriented to person, place, and time. Mental status is at baseline.   Psychiatric:         Mood and Affect: Mood normal.         Behavior: Behavior normal.         Thought Content: Thought content normal.              Result Review  Data Reviewed:{ Labs  Result Review  Imaging  Med Tab  Media :23}   ECG 12 Lead (03/25/2022 09:12)  Vitamin B12 (10/31/2021 09:42)  Vitamin B1, Whole Blood (10/31/2021 09:41)  Hepatitis C Antibody (10/31/2021 09:41)  Lipid Panel (10/31/2021 09:41)  CBC Auto Differential (10/31/2021 09:41)  Comprehensive Metabolic Panel (10/31/2021 09:41)    Consultant notes Navya MARTÍNEZ            Assessment and Plan {CC Problem List  Visit Diagnosis  ROS  Review (Popup)  Health Maintenance  Quality  BestPractice  Medications  SmartSets  SnapShot Encounters  Media :23}   1. Family history of premature CAD  We discussed cardiac prevention including good blood pressure and lipid control as well as regular exercise.  We discussed statin therapy and aspirin.  We will plan on starting statins pending upcoming lipid panel.  Will consider aspirin pending CCS  - CT Cardiac Calcium Score Without Dye; Future    2. Primary hypertension  Appears to be well controlled  Continue carvedilol, indapamide, and losartan  - CT Cardiac Calcium Score Without Dye; Future    3. Hyperlipidemia, unspecified hyperlipidemia type    - CT Cardiac Calcium Score Without Dye; Future    The 10-year ASCVD risk score (Lafitte DARIANA Jr., et al., 2013) is:  13.2%    Values used to calculate the score:      Age: 62 years      Sex: Male      Is Non- : No      Diabetic: No      Tobacco smoker: No      Systolic Blood Pressure: 133 mmHg      Is BP treated: Yes      HDL Cholesterol: 49 mg/dL      Total Cholesterol: 229 mg/dL  Discuss benefits of statins for prevention of plaque rupture.  Recommend statin therapy if lipids still elevated.  He reports that he will have lab work ordered from PCP done today    4. PVCs  He currently is mostly asymptomatic. Had normal evaluation years ago. Advised him to call if he starts experiencing worsening palpitations      Follow Up {Instructions Charge Capture  Follow-up Communications :23}   Return if symptoms worsen or fail to improve.    Patient was given instructions and counseling regarding his condition or for health maintenance advice. Please see specific information pulled into the AVS if appropriate.  Advised to call the [facility] with any questions, concerns, or worsening symptoms.

## 2022-04-08 ENCOUNTER — LAB (OUTPATIENT)
Dept: LAB | Facility: HOSPITAL | Age: 62
End: 2022-04-08

## 2022-04-08 ENCOUNTER — OFFICE VISIT (OUTPATIENT)
Dept: CARDIOLOGY | Facility: HOSPITAL | Age: 62
End: 2022-04-08

## 2022-04-08 VITALS
BODY MASS INDEX: 27.83 KG/M2 | OXYGEN SATURATION: 97 % | SYSTOLIC BLOOD PRESSURE: 133 MMHG | DIASTOLIC BLOOD PRESSURE: 86 MMHG | HEART RATE: 69 BPM | RESPIRATION RATE: 18 BRPM | HEIGHT: 76 IN | TEMPERATURE: 97.1 F | WEIGHT: 228.5 LBS

## 2022-04-08 DIAGNOSIS — Z11.59 NEED FOR HEPATITIS C SCREENING TEST: ICD-10-CM

## 2022-04-08 DIAGNOSIS — I10 PRIMARY HYPERTENSION: ICD-10-CM

## 2022-04-08 DIAGNOSIS — I49.3 PVC'S (PREMATURE VENTRICULAR CONTRACTIONS): Chronic | ICD-10-CM

## 2022-04-08 DIAGNOSIS — Z82.49 FAMILY HISTORY OF PREMATURE CAD: Primary | ICD-10-CM

## 2022-04-08 DIAGNOSIS — Z00.00 ANNUAL PHYSICAL EXAM: ICD-10-CM

## 2022-04-08 DIAGNOSIS — E78.5 HYPERLIPIDEMIA, UNSPECIFIED HYPERLIPIDEMIA TYPE: ICD-10-CM

## 2022-04-08 DIAGNOSIS — F10.20 ALCOHOL DEPENDENCE, DAILY USE: ICD-10-CM

## 2022-04-08 LAB
ALBUMIN SERPL-MCNC: 5.2 G/DL (ref 3.5–5.2)
ALBUMIN/GLOB SERPL: 2.3 G/DL
ALP SERPL-CCNC: 52 U/L (ref 39–117)
ALT SERPL W P-5'-P-CCNC: 145 U/L (ref 1–41)
ANION GAP SERPL CALCULATED.3IONS-SCNC: 9.7 MMOL/L (ref 5–15)
AST SERPL-CCNC: 62 U/L (ref 1–40)
BASOPHILS # BLD AUTO: 0.08 10*3/MM3 (ref 0–0.2)
BASOPHILS NFR BLD AUTO: 1.3 % (ref 0–1.5)
BILIRUB SERPL-MCNC: 0.7 MG/DL (ref 0–1.2)
BUN SERPL-MCNC: 16 MG/DL (ref 8–23)
BUN/CREAT SERPL: 18.4 (ref 7–25)
CALCIUM SPEC-SCNC: 10.2 MG/DL (ref 8.6–10.5)
CHLORIDE SERPL-SCNC: 90 MMOL/L (ref 98–107)
CHOLEST SERPL-MCNC: 263 MG/DL (ref 0–200)
CO2 SERPL-SCNC: 31.3 MMOL/L (ref 22–29)
CREAT SERPL-MCNC: 0.87 MG/DL (ref 0.76–1.27)
DEPRECATED RDW RBC AUTO: 46.1 FL (ref 37–54)
EGFRCR SERPLBLD CKD-EPI 2021: 98.2 ML/MIN/1.73
EOSINOPHIL # BLD AUTO: 0.37 10*3/MM3 (ref 0–0.4)
EOSINOPHIL NFR BLD AUTO: 5.8 % (ref 0.3–6.2)
ERYTHROCYTE [DISTWIDTH] IN BLOOD BY AUTOMATED COUNT: 13.6 % (ref 12.3–15.4)
GLOBULIN UR ELPH-MCNC: 2.3 GM/DL
GLUCOSE SERPL-MCNC: 131 MG/DL (ref 65–99)
HCT VFR BLD AUTO: 46.6 % (ref 37.5–51)
HCV AB SER DONR QL: NORMAL
HDLC SERPL-MCNC: 55 MG/DL (ref 40–60)
HGB BLD-MCNC: 15.9 G/DL (ref 13–17.7)
IMM GRANULOCYTES # BLD AUTO: 0.07 10*3/MM3 (ref 0–0.05)
IMM GRANULOCYTES NFR BLD AUTO: 1.1 % (ref 0–0.5)
LDLC SERPL CALC-MCNC: 184 MG/DL (ref 0–100)
LDLC/HDLC SERPL: 3.3 {RATIO}
LYMPHOCYTES # BLD AUTO: 1.81 10*3/MM3 (ref 0.7–3.1)
LYMPHOCYTES NFR BLD AUTO: 28.3 % (ref 19.6–45.3)
MCH RBC QN AUTO: 31.4 PG (ref 26.6–33)
MCHC RBC AUTO-ENTMCNC: 34.1 G/DL (ref 31.5–35.7)
MCV RBC AUTO: 91.9 FL (ref 79–97)
MONOCYTES # BLD AUTO: 0.95 10*3/MM3 (ref 0.1–0.9)
MONOCYTES NFR BLD AUTO: 14.9 % (ref 5–12)
NEUTROPHILS NFR BLD AUTO: 3.11 10*3/MM3 (ref 1.7–7)
NEUTROPHILS NFR BLD AUTO: 48.6 % (ref 42.7–76)
NRBC BLD AUTO-RTO: 0 /100 WBC (ref 0–0.2)
PLATELET # BLD AUTO: 228 10*3/MM3 (ref 140–450)
PMV BLD AUTO: 9.8 FL (ref 6–12)
POTASSIUM SERPL-SCNC: 4.2 MMOL/L (ref 3.5–5.2)
PROT SERPL-MCNC: 7.5 G/DL (ref 6–8.5)
RBC # BLD AUTO: 5.07 10*6/MM3 (ref 4.14–5.8)
SODIUM SERPL-SCNC: 131 MMOL/L (ref 136–145)
TRIGL SERPL-MCNC: 133 MG/DL (ref 0–150)
VIT B12 BLD-MCNC: 870 PG/ML (ref 211–946)
VLDLC SERPL-MCNC: 24 MG/DL (ref 5–40)
WBC NRBC COR # BLD: 6.39 10*3/MM3 (ref 3.4–10.8)

## 2022-04-08 PROCEDURE — 84425 ASSAY OF VITAMIN B-1: CPT

## 2022-04-08 PROCEDURE — 99214 OFFICE O/P EST MOD 30 MIN: CPT | Performed by: NURSE PRACTITIONER

## 2022-04-08 PROCEDURE — 80061 LIPID PANEL: CPT

## 2022-04-08 PROCEDURE — 80053 COMPREHEN METABOLIC PANEL: CPT

## 2022-04-08 PROCEDURE — 86803 HEPATITIS C AB TEST: CPT

## 2022-04-08 PROCEDURE — 82607 VITAMIN B-12: CPT

## 2022-04-08 PROCEDURE — 85025 COMPLETE CBC W/AUTO DIFF WBC: CPT

## 2022-04-16 RX ORDER — LOSARTAN POTASSIUM 100 MG/1
TABLET ORAL
Qty: 90 TABLET | Refills: 0 | OUTPATIENT
Start: 2022-04-16

## 2022-04-19 LAB — VIT B1 BLD-SCNC: 125 NMOL/L (ref 66.5–200)

## 2022-04-20 DIAGNOSIS — I10 ESSENTIAL HYPERTENSION: ICD-10-CM

## 2022-04-20 RX ORDER — CARVEDILOL 25 MG/1
TABLET ORAL
Qty: 60 TABLET | Refills: 1 | Status: SHIPPED | OUTPATIENT
Start: 2022-04-20 | End: 2022-06-17

## 2022-05-08 ENCOUNTER — APPOINTMENT (OUTPATIENT)
Dept: CT IMAGING | Facility: HOSPITAL | Age: 62
End: 2022-05-08

## 2022-05-08 ENCOUNTER — HOSPITAL ENCOUNTER (OUTPATIENT)
Dept: CT IMAGING | Facility: HOSPITAL | Age: 62
Discharge: HOME | End: 2022-05-08
Admitting: NURSE PRACTITIONER

## 2022-05-08 DIAGNOSIS — E78.5 HYPERLIPIDEMIA, UNSPECIFIED HYPERLIPIDEMIA TYPE: ICD-10-CM

## 2022-05-08 DIAGNOSIS — I10 PRIMARY HYPERTENSION: ICD-10-CM

## 2022-05-08 DIAGNOSIS — Z82.49 FAMILY HISTORY OF PREMATURE CAD: ICD-10-CM

## 2022-05-08 PROCEDURE — 75571 CT HRT W/O DYE W/CA TEST: CPT

## 2022-05-09 ENCOUNTER — TELEPHONE (OUTPATIENT)
Dept: CARDIOLOGY | Facility: HOSPITAL | Age: 62
End: 2022-05-09

## 2022-05-09 DIAGNOSIS — E78.5 HYPERLIPIDEMIA, UNSPECIFIED HYPERLIPIDEMIA TYPE: Primary | ICD-10-CM

## 2022-05-09 DIAGNOSIS — R93.1 ELEVATED CORONARY ARTERY CALCIUM SCORE: ICD-10-CM

## 2022-05-09 DIAGNOSIS — Z82.49 FAMILY HISTORY OF PREMATURE CAD: ICD-10-CM

## 2022-05-09 RX ORDER — ASPIRIN 81 MG/1
81 TABLET ORAL DAILY
Qty: 30 TABLET | Refills: 3
Start: 2022-05-09 | End: 2023-02-08

## 2022-05-09 RX ORDER — ROSUVASTATIN CALCIUM 10 MG/1
10 TABLET, COATED ORAL DAILY
Qty: 30 TABLET | Refills: 1 | Status: SHIPPED | OUTPATIENT
Start: 2022-05-09 | End: 2022-07-26

## 2022-05-09 NOTE — TELEPHONE ENCOUNTER
"Called patient with CCS results which showed a CCS of 359. Left main was 243. Recommend starting statin.  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  He currently is on Campral and is going to go to counseling.  He is currently cutting back.  We will start him on Crestor 10 mg daily and repeat LFTs in 4 to 6 weeks.  We will refer him to cardiology since coronary calcification is mostly located in the left main.  At this time he is asymptomatic.  I advised him to call me if he develops any symptoms.  He verbalized understanding with no further questions or concerns  "

## 2022-05-16 ENCOUNTER — TELEMEDICINE (OUTPATIENT)
Dept: PSYCHIATRY | Facility: CLINIC | Age: 62
End: 2022-05-16

## 2022-05-16 DIAGNOSIS — F10.20 ALCOHOL DEPENDENCE, DAILY USE: Primary | ICD-10-CM

## 2022-05-16 PROCEDURE — 90832 PSYTX W PT 30 MINUTES: CPT | Performed by: SOCIAL WORKER

## 2022-05-16 NOTE — PROGRESS NOTES
[facility]   Follow-up Progress Note     Date: May 24, 2022  Time In: 12:31PM  Time Out: 12:53 PM      PROGRESS NOTE  Data:  Randy Asif is a 62 y.o. male presenting to [facility] for follow-up with Rin Roberts LCSW. The patient is seen remotely using [facility] My Chart. Patient is being seen via telehealth and stated they are in a secure environment for this session. The patient's condition being diagnosed/treated is appropriate for telemedicine. The provider identified herself as well as her credentials. The patient and/or patients guardian consent to be seen remotely, and when consent is given they understand that the consent allows for patient identifiable information to be sent to a third party as needed. They may refuse to be seen remotely at any time. The electronic data is encrypted and password protected, and the patient has been advised of the potential risks to privacy not withstanding such measures.    Today Patient presented for follow up appointment and checked in with how things been going.  Patient stated things have been okay and he went 5 days without a drink.  Patient stated he has been able to reduce his alcohol consumption syndrome.  Patient did report some binge drinking following multiple days of sobriety and stated he will usually have about 6 shots, 2-hour span.  Patient stated other than this he has not been drinking frequently and is able to do a lot of things to stay control and stay busy.  Patient stated he would like to be alcohol free before his trip to [place] with his children.  Patient and provider discussed the need to consult with his physician if he plans to stop drinking cold turkey.  Patient provider discussed treatment recommendations as well as transition to a new therapist due to current therapist leaving.    Clinical Maneuvering/Intervention: Treatment planning     Assisted Patient in  processing above session content; acknowledged and normalized patient’s thoughts, feelings, and concerns.  Rationalized patient thought process regarding alcohol use.  Discussed triggers associated with patient's alcohol use.  Also discussed coping skills for patient to implement such as behavior changes.    Allowed Patient to freely discuss issues  without interruption or judgement with unconditional positive regard, active listening skills, and empathy. Therapist provided a safe, confidential environment to facilitate the development of a positive therapeutic relationship and encouraged open, honest communication. Assisted Patient in identifying risk factors which would indicate the need for higher level of care including thoughts to harm self or others and/or self-harming behavior and encouraged Patient to contact this office, call 911, or present to the nearest emergency room should any of these events occur. Discussed crisis intervention services and means to access. Patient adamantly and convincingly denies current suicidal or homicidal ideation or perceptual disturbance. Assisted Patient in processing session content; acknowledged and normalized Patient’s thoughts, feelings, and concerns by utilizing a person-centered approach in efforts to build appropriate rapport and a positive therapeutic relationship with open and honest communication. Therapist utilized dialectical behavior techniques to teach and model emotional regulation and relaxation methods. Therapist assisted Patient with identifying and implementing healthier coping strategies.     Assessment   Patient appears to be maintaining relative stability as compared to baseline.  Patient continues to struggle with alcohol use.   As a result, they can be reasonably expected to continue to benefit from treatment and would likely be at increased risk for decompensation otherwise.    Mental Status Exam:   Hygiene:   good  Cooperation:  Cooperative  Eye  Contact:  Good  Psychomotor Behavior:  Appropriate  Affect:  Appropriate  Mood: normal  Speech:  Normal  Thought Process:  Goal directed  Thought Content:  Normal  Suicidal:  None  Homicidal:  None  Hallucinations:  None  Delusion:  None  Memory:  Intact  Orientation:  Person, Place, Time and Situation  Reliability:  good  Insight:  Good  Judgement:  Good  Impulse Control:  Good  Physical/Medical Issues:  No      PHQ-Score Total:  PHQ-9 Total Score:      PILY-7:       Patient's Support Network Includes:  children    Functional Status: Moderate impairment     Progress toward goal: Not at goal    Prognosis: Good with Ongoing Treatment            Impression/Formulation:    VISIT DIAGNOSIS:     ICD-10-CM ICD-9-CM   1. Alcohol dependence, daily use ([facility])  F10.20 303.91        Patient appeared alert and oriented.  Patient is voluntarily requesting to continue outpatient therapy at [facility].  Patient is receptive to assistance with maintaining a stable lifestyle.  Patient presents with history of alcohol use.  Patient is agreeable to attend routine therapy sessions.  Patient expressed desire to maintain stability and participate in the therapeutic process.        Crisis Plan:  If symptoms/behaviors persist, Patient will present to the nearest hospital for an assessment. Advised patient of [facility] ER and assessment services.     Plan:   Patient will continue in individual outpatient therapy with focus on improved functioning and coping skills, maintaining stability, and avoiding decompensation and the need for higher level of care.    Patient will contact this office, call 911 or present to the nearest emergency room should suicidal or homicidal ideations occur. Provide Cognitive Behavioral Therapy and Solution Focused Therapy to improve functioning, maintain stability, and avoid decompensation and the need for higher level of care.     Return in about 2 weeks, or earlier if  symptoms worsen or fail to improve.    Recommended Referrals: Patient is being referred to a new therapist        This document has been electronically signed by Rin Roberts LCSW  May 24, 2022 09:38 EDT        Part of this note may be an electronic transcription/translation of spoken language to printed text using the Dragon Dictation System.

## 2022-05-18 DIAGNOSIS — F10.20 ALCOHOL DEPENDENCE, DAILY USE: ICD-10-CM

## 2022-05-18 RX ORDER — ACAMPROSATE CALCIUM 333 MG/1
TABLET, DELAYED RELEASE ORAL
Qty: 180 TABLET | Refills: 0 | Status: SHIPPED | OUTPATIENT
Start: 2022-05-18 | End: 2022-06-20 | Stop reason: SDUPTHER

## 2022-06-15 DIAGNOSIS — F10.20 ALCOHOL DEPENDENCE, DAILY USE: ICD-10-CM

## 2022-06-15 RX ORDER — ACAMPROSATE CALCIUM 333 MG/1
TABLET, DELAYED RELEASE ORAL
Qty: 180 TABLET | Refills: 0 | OUTPATIENT
Start: 2022-06-15

## 2022-06-17 DIAGNOSIS — I10 ESSENTIAL HYPERTENSION: ICD-10-CM

## 2022-06-17 RX ORDER — CARVEDILOL 25 MG/1
TABLET ORAL
Qty: 60 TABLET | Refills: 1 | Status: SHIPPED | OUTPATIENT
Start: 2022-06-17 | End: 2022-08-22

## 2022-06-20 ENCOUNTER — TELEMEDICINE (OUTPATIENT)
Dept: PSYCHIATRY | Facility: CLINIC | Age: 62
End: 2022-06-20

## 2022-06-20 ENCOUNTER — LAB (OUTPATIENT)
Dept: LAB | Facility: HOSPITAL | Age: 62
End: 2022-06-20

## 2022-06-20 ENCOUNTER — OFFICE VISIT (OUTPATIENT)
Dept: FAMILY MEDICINE CLINIC | Facility: CLINIC | Age: 62
End: 2022-06-20

## 2022-06-20 VITALS
OXYGEN SATURATION: 95 % | SYSTOLIC BLOOD PRESSURE: 120 MMHG | TEMPERATURE: 97.7 F | HEIGHT: 76 IN | WEIGHT: 232.8 LBS | DIASTOLIC BLOOD PRESSURE: 80 MMHG | HEART RATE: 67 BPM | RESPIRATION RATE: 18 BRPM | BODY MASS INDEX: 28.35 KG/M2

## 2022-06-20 DIAGNOSIS — F10.20 ALCOHOL DEPENDENCE, DAILY USE: Primary | ICD-10-CM

## 2022-06-20 DIAGNOSIS — F10.20 ALCOHOL DEPENDENCE, DAILY USE: ICD-10-CM

## 2022-06-20 DIAGNOSIS — E78.5 HYPERLIPIDEMIA, UNSPECIFIED HYPERLIPIDEMIA TYPE: Primary | ICD-10-CM

## 2022-06-20 DIAGNOSIS — R74.8 ELEVATED LIVER ENZYMES: ICD-10-CM

## 2022-06-20 DIAGNOSIS — F40.243 ANXIETY WITH FLYING: ICD-10-CM

## 2022-06-20 LAB
ALBUMIN SERPL-MCNC: 4.7 G/DL (ref 3.5–5.2)
ALBUMIN/GLOB SERPL: 2.4 G/DL
ALP SERPL-CCNC: 59 U/L (ref 39–117)
ALT SERPL W P-5'-P-CCNC: 53 U/L (ref 1–41)
ANION GAP SERPL CALCULATED.3IONS-SCNC: 14.4 MMOL/L (ref 5–15)
AST SERPL-CCNC: 27 U/L (ref 1–40)
BILIRUB SERPL-MCNC: 0.4 MG/DL (ref 0–1.2)
BUN SERPL-MCNC: 13 MG/DL (ref 8–23)
BUN/CREAT SERPL: 15.7 (ref 7–25)
CALCIUM SPEC-SCNC: 9.9 MG/DL (ref 8.6–10.5)
CHLORIDE SERPL-SCNC: 96 MMOL/L (ref 98–107)
CHOLEST SERPL-MCNC: 217 MG/DL (ref 0–200)
CO2 SERPL-SCNC: 25.6 MMOL/L (ref 22–29)
CREAT SERPL-MCNC: 0.83 MG/DL (ref 0.76–1.27)
EGFRCR SERPLBLD CKD-EPI 2021: 99.6 ML/MIN/1.73
GLOBULIN UR ELPH-MCNC: 2 GM/DL
GLUCOSE SERPL-MCNC: 103 MG/DL (ref 65–99)
HDLC SERPL-MCNC: 43 MG/DL (ref 40–60)
LDLC SERPL CALC-MCNC: 154 MG/DL (ref 0–100)
LDLC/HDLC SERPL: 3.53 {RATIO}
POTASSIUM SERPL-SCNC: 3.4 MMOL/L (ref 3.5–5.2)
PROT SERPL-MCNC: 6.7 G/DL (ref 6–8.5)
SODIUM SERPL-SCNC: 136 MMOL/L (ref 136–145)
TRIGL SERPL-MCNC: 112 MG/DL (ref 0–150)
VLDLC SERPL-MCNC: 20 MG/DL (ref 5–40)

## 2022-06-20 PROCEDURE — 36415 COLL VENOUS BLD VENIPUNCTURE: CPT | Performed by: NURSE PRACTITIONER

## 2022-06-20 PROCEDURE — 80061 LIPID PANEL: CPT | Performed by: NURSE PRACTITIONER

## 2022-06-20 PROCEDURE — 90834 PSYTX W PT 45 MINUTES: CPT | Performed by: COUNSELOR

## 2022-06-20 PROCEDURE — 99213 OFFICE O/P EST LOW 20 MIN: CPT | Performed by: NURSE PRACTITIONER

## 2022-06-20 PROCEDURE — 80053 COMPREHEN METABOLIC PANEL: CPT | Performed by: NURSE PRACTITIONER

## 2022-06-20 RX ORDER — DIAZEPAM 2 MG/1
2 TABLET ORAL 2 TIMES DAILY PRN
Qty: 3 TABLET | Refills: 0 | Status: SHIPPED | OUTPATIENT
Start: 2022-06-20 | End: 2023-02-08

## 2022-06-20 RX ORDER — ACAMPROSATE CALCIUM 333 MG/1
666 TABLET, DELAYED RELEASE ORAL 3 TIMES DAILY
Qty: 180 TABLET | Refills: 0 | Status: SHIPPED | OUTPATIENT
Start: 2022-06-20 | End: 2022-07-26 | Stop reason: SDUPTHER

## 2022-06-20 RX ORDER — ASPIRIN 81 MG/1
81 TABLET ORAL DAILY
Qty: 30 TABLET | Refills: 3 | Status: CANCELLED
Start: 2022-06-20

## 2022-06-20 NOTE — PROGRESS NOTES
Date: June 20, 2022  Time In:  8:58 am   Time Out: 9:47 am   This provider is located at the [facility]), [address], [place] [address] using a secure [employer] Video Visit through [employer]. Patient is being seen remotely via telehealth at home address in [place] and stated they are in a secure environment for this session. The patient's condition being diagnosed/treated is appropriate for telemedicine. The provider identified herself as well as her credentials. The patient, and/or patients guardian, consent to be seen remotely, and when consent is given they understand that the consent allows for patient identifiable information to be sent to a third party as needed. They may refuse to be seen remotely at any time. The electronic data is encrypted and password protected, and the patient and/or guardian has been advised of the potential risks to privacy not withstanding such measures.     You have chosen to receive care through a telehealth visit.  Do you consent to use a video/audio connection for your medical care today? Yes    PROGRESS NOTE  Data:  Randy Asif is a 62 y.o. male who presents today for follow up.  Patient was transfer from another therapist. Therapist conducted check in with patient in order to identify progress and what has been worked on with previous therapist. Patient stated that he went to [facility] in [place] recently for assistance.  Patient stated that he has been up to 5 day of no alcohol. Patient stated that he will be going to [place] soon. Patient stated that he learned a lot of coping skills. Patient stated that he has a plan for himself and also for his son. Patient expressed that going to , stay away from friends who would be trigger. Patient described his reasoning for wanting to get assistance and changing behaviors. Patient stated that he focused on his plan. Patient stated that his medication is also  helpful. Patient identified that he is goal oriented which he feels like has been helpful in being successful this far and will continue in the future. Patient stated that he has supports in place and these are people that will hold him accountable. Patient explained that he has a plan for when he gets back from his trip so that he can also be focused on maintaining without alcohol. Patient discussed his aftercare plan from being inpatient. Patient shared that he plans to do things that he enjoys and establish hobbies because he wants to enjoy life.     Clinical Maneuvering/Intervention: CBT    (Scales based on 0 - 10 with 10 being the worst)  Depression:  Not scaled in session Anxiety: Not scaled in session        Assisted patient in processing above session content; acknowledged and normalized patient’s thoughts, feelings, and concerns.  Rationalized patient thought process regarding plans for the future.  Discussed triggers associated with patient's alcohol use.  Also discussed coping skills for patient to implement such as attending AA.     Utilized person centered rapport building with patient in order to begin therapeutic alliance. Allowed patient to freely discuss issues without interruption or judgment. Provided safe, confidential environment to facilitate the development of positive therapeutic relationship and encourage open, honest communication. Patient discussed his history use of alcohol beginning when patient was a teenager. Patient discussed the important setting his goals and sticking to them. Patient was able to identify what he would like to achieve in the process. Patient described the process of receiving inpatient treatment. Patient expressed ways that he will help in support of his son and ways that his son can also be a support for him.  Assisted patient in identifying risk factors which would indicate the need for higher level of care including thoughts to harm self or others and/or  self-harming behavior and encouraged patient to contact this office, call 911, or present to the nearest emergency room should any of these events occur. Discussed crisis intervention services and means to access. Patient adamantly and convincingly denies current suicidal or homicidal ideation or perceptual disturbance. Patient was able to state a plan in order to remain successful and not engage in consumption as part of his goal in preparing for upcoming trip.     Assessment:   Assessment   Patient appears to maintain relative stability as compared to their baseline.  However, patient continues to struggle with alcohol use. which continues to cause impairment in important areas of functioning.  A result, they can be reasonably expected to continue to benefit from treatment and would likely be at increased risk for decompensation otherwise.    Mental Status Exam:   Hygiene:   good  Cooperation:  Cooperative  Eye Contact:  Fair  Psychomotor Behavior:  Appropriate  Affect:  Full range  Mood: normal  Speech:  Normal  Thought Process:  Goal directed  Thought Content:  Normal  Suicidal:  None  Homicidal:  None  Hallucinations:  None  Delusion:  None  Memory:  Intact  Orientation:  Person, Place, Time and Situation  Reliability:  good  Insight:  Good  Judgement:  Good  Impulse Control:  Fair  Physical/Medical Issues:  No        Patient's Support Network Includes:  children and appropriate friends    Functional Status: Moderate impairment     Progress toward goal: Not at goal    Prognosis: Good with Ongoing Treatment          Plan:  Patient will continue in individual outpatient therapy with focus on improved functioning and coping skills, maintaining stability, and avoiding decompensation and the need for higher level of care.    Patient will adhere to medication regimen as prescribed and report any side effects. Patient will contact this office, call 911 or present to the nearest emergency room should suicidal or  homicidal ideations occur. Provide Cognitive Behavioral Therapy and Solution Focused Therapy to improve functioning, maintain stability, and avoid decompensation and the need for higher level of care.     Return in about 1 week, or earlier if symptoms worsen or fail to improve.           VISIT DIAGNOSIS:     ICD-10-CM ICD-9-CM   1. Alcohol dependence, daily use ([facility])  F10.20 303.91             This document has been electronically signed by PRO Harrison  June 20, 2022 09:02 EDT      Part of this note may be an electronic transcription/translation of spoken language to printed text using the Dragon Dictation System.

## 2022-06-20 NOTE — PROGRESS NOTES
"Chief Complaint  medication questions (Pt. Travel to [place] June 29-July 17 and wanted to discuss if he can be prescribed Valium for the flights. )    Subjective        Randy Asif presents to [facility]  Patient presents for follow up elevated liver enzymes. States he has stopped drinking alcohol in last 2 months and would like repeat lab to assess liver enzymes. There was a liver ultrasound ordered per  that he has not had performed yet and he wanted to wait and stop alcohol first and get repeat labs.  He is requesting a low dose of Valium for flight upcoming flight to [place] and back. States he only wants lowest dose because he knows benzodiazepines are addictive.       Objective   Vital Signs:  /80 (BP Location: Right arm, Patient Position: Sitting, Cuff Size: Adult)   Pulse 67   Temp 97.7 °F (36.5 °C) (Temporal)   Resp 18   Ht 193 cm (75.98\")   Wt 106 kg (232 lb 12.8 oz)   SpO2 95%   BMI 28.35 kg/m²   Estimated body mass index is 28.35 kg/m² as calculated from the following:    Height as of this encounter: 193 cm (75.98\").    Weight as of this encounter: 106 kg (232 lb 12.8 oz).          Physical Exam  Vitals and nursing note reviewed.   Constitutional:       General: He is not in acute distress.     Appearance: Normal appearance.   HENT:      Head: Normocephalic.      Right Ear: External ear normal.      Left Ear: External ear normal.      Nose: Nose normal.   Eyes:      Extraocular Movements: Extraocular movements intact.      Conjunctiva/sclera: Conjunctivae normal.      Pupils: Pupils are equal, round, and reactive to light.   Neck:      Vascular: No carotid bruit.   Cardiovascular:      Rate and Rhythm: Normal rate and regular rhythm.      Heart sounds: Normal heart sounds.   Pulmonary:      Effort: Pulmonary effort is normal.      Breath sounds: Normal breath sounds.   Abdominal:      General: Bowel sounds are normal. There is no distension.      " Palpations: Abdomen is soft.      Tenderness: There is no abdominal tenderness.   Musculoskeletal:      Cervical back: Normal range of motion.      Right lower leg: No edema.      Left lower leg: No edema.   Skin:     General: Skin is warm and dry.   Neurological:      Mental Status: He is alert and oriented to person, place, and time.   Psychiatric:         Mood and Affect: Mood normal.         Behavior: Behavior normal.         Thought Content: Thought content normal.         Judgment: Judgment normal.        Result Review :    Common labs    Common Labsle 4/8/22 4/8/22 4/8/22 6/20/22 6/20/22    0939 0939 0939 1054 1054   Glucose   131 (A) 103 (A)    BUN   16 13    Creatinine   0.87 0.83    Sodium   131 (A) 136    Potassium   4.2 3.4 (A)    Chloride   90 (A) 96 (A)    Calcium   10.2 9.9    Albumin   5.20 4.70    Total Bilirubin   0.7 0.4    Alkaline Phosphatase   52 59    AST (SGOT)   62 (A) 27    ALT (SGPT)   145 (A) 53 (A)    WBC 6.39       Hemoglobin 15.9       Hematocrit 46.6       Platelets 228       Total Cholesterol  263 (A)   217 (A)   Triglycerides  133   112   HDL Cholesterol  55   43   LDL Cholesterol   184 (A)   154 (A)   (A) Abnormal value            Data reviewed: previous office visit note with PCP          Assessment and Plan   Diagnoses and all orders for this visit:    1. Hyperlipidemia, unspecified hyperlipidemia type (Primary)  Assessment & Plan:  Lipid abnormalities are improving with treatment.  Nutritional counseling was provided. and Pharmacotherapy as ordered.  Lipids will be reassessed At next appointment with PCP.    Orders:  -     Comprehensive Metabolic Panel  -     Lipid Panel    2. Anxiety with flying  Assessment & Plan:  Prescribed one time Valium prescription to use for upcoming flight as patient reports much anxiety with flying and has long flight upcoming.     Orders:  -     diazePAM (Valium) 2 MG tablet; Take 1 tablet by mouth 2 (Two) Times a Day As Needed for Anxiety for up to  3 doses.  Dispense: 3 tablet; Refill: 0    3. Elevated liver enzymes  Assessment & Plan:  Patient with recent elevated liver enzymes and reports alcohol cessation and will reassess liver enzymes today. He wants to have lab reassessment before doing previously ordered ultrasound. Will inform of results upon review and return.     Orders:  -     Cancel: Comprehensive Metabolic Panel           Follow Up   Return in about 3 months (around 9/20/2022), or if symptoms worsen or fail to improve, for Next scheduled follow up.  Patient was given instructions and counseling regarding his condition or for health maintenance advice. Please see specific information pulled into the AVS if appropriate.

## 2022-06-21 ENCOUNTER — TELEPHONE (OUTPATIENT)
Dept: CARDIOLOGY | Facility: HOSPITAL | Age: 62
End: 2022-06-21

## 2022-06-21 NOTE — TELEPHONE ENCOUNTER
Called patient with lab results. Elevated lipids noted. He did not start the statin because he figured that it took 20 years of the plaque to get there and that maybe he had another 20 years ago.  I discussed risk of plaque rupture and benefits of statin therapy.  He says he is willing to start taking.  Patient to keep his follow-up with Dr. Rice.  Discussed low potassium level and recommended to increase potassium rich foods.  Will defer further monitoring to PCP

## 2022-06-24 PROBLEM — E78.5 HYPERLIPIDEMIA: Status: ACTIVE | Noted: 2022-06-24

## 2022-06-24 PROBLEM — R74.8 ELEVATED LIVER ENZYMES: Status: ACTIVE | Noted: 2022-06-24

## 2022-06-24 PROBLEM — F40.243 ANXIETY WITH FLYING: Status: ACTIVE | Noted: 2022-06-24

## 2022-06-24 NOTE — ASSESSMENT & PLAN NOTE
Lipid abnormalities are improving with treatment.  Nutritional counseling was provided. and Pharmacotherapy as ordered.  Lipids will be reassessed At next appointment with PCP.

## 2022-06-24 NOTE — ASSESSMENT & PLAN NOTE
Prescribed one time Valium prescription to use for upcoming flight as patient reports much anxiety with flying and has long flight upcoming.

## 2022-06-24 NOTE — ASSESSMENT & PLAN NOTE
Patient with recent elevated liver enzymes and reports alcohol cessation and will reassess liver enzymes today. He wants to have lab reassessment before doing previously ordered ultrasound. Will inform of results upon review and return.

## 2022-06-26 DIAGNOSIS — I10 UNCONTROLLED HYPERTENSION: ICD-10-CM

## 2022-06-27 ENCOUNTER — TELEPHONE (OUTPATIENT)
Dept: FAMILY MEDICINE CLINIC | Facility: CLINIC | Age: 62
End: 2022-06-27

## 2022-06-27 ENCOUNTER — TELEPHONE (OUTPATIENT)
Dept: PSYCHIATRY | Facility: CLINIC | Age: 62
End: 2022-06-27

## 2022-06-27 DIAGNOSIS — F10.20 ALCOHOL DEPENDENCE, DAILY USE: ICD-10-CM

## 2022-06-27 RX ORDER — NALTREXONE HYDROCHLORIDE 50 MG/1
50 TABLET, FILM COATED ORAL DAILY
Qty: 30 TABLET | Refills: 0 | Status: SHIPPED | OUTPATIENT
Start: 2022-06-27 | End: 2022-07-26 | Stop reason: SDUPTHER

## 2022-06-27 RX ORDER — INDAPAMIDE 2.5 MG/1
TABLET, FILM COATED ORAL
Qty: 90 TABLET | Refills: 0 | Status: SHIPPED | OUTPATIENT
Start: 2022-06-27 | End: 2022-09-29

## 2022-06-27 NOTE — TELEPHONE ENCOUNTER
Can you confirm the medication with the pharmacy?  Sounds like naltrexone 50 mg daily.  Also, can you asked the patient if he is continuing to take Campral as well?  Thanks

## 2022-06-27 NOTE — TELEPHONE ENCOUNTER
Caller: Randy Asif    Relationship: Self    Best call back number: [phone]    What is the best time to reach you: ANYTIME    Who are you requesting to speak with (clinical staff, provider,  specific staff member): CLINICAL STAFF    Do you know the name of the person who called: SELF    What was the call regarding: PATIENT STATES THAT HE WOULD LIKE A CALL ABOUT A MEDICATION THAT HE RECEIVED IN REHAB FOR ALCOHOLISM.    Do you require a callback: YES

## 2022-06-27 NOTE — TELEPHONE ENCOUNTER
Rx Refill Note  Requested Prescriptions     Pending Prescriptions Disp Refills   • indapamide (LOZOL) 2.5 MG tablet [Pharmacy Med Name: INDAPAMIDE 2.5 MG TABLET] 90 tablet 0     Sig: TAKE ONE TABLET BY MOUTH EVERY MORNING      Last office visit with prescribing clinician: 3/25/2022      Next office visit with prescribing clinician: 6/27/2022            Clarisse Becker LPN  06/27/22, 08:41 EDT

## 2022-06-27 NOTE — TELEPHONE ENCOUNTER
Patient is going to [place] tomorrow, he cancelled for tomorrow and rescheduled. He did check himself into rehab for alcohol and while he was there the doctor prescribed Maltrexone, he said this has worked wonders but he has run out of this med and he called the rehab and was told that once discharged the doctor there will no longer fill meds and he was referred to call our office. Is this something you can call in for him asap as he fly's out tomorrow morning to [place].     If yes the prescription is     Maltrexone 50mg 1x Day    Thank You

## 2022-07-17 DIAGNOSIS — F10.20 ALCOHOL DEPENDENCE, DAILY USE: ICD-10-CM

## 2022-07-18 RX ORDER — ROSUVASTATIN CALCIUM 10 MG/1
TABLET, COATED ORAL
Qty: 30 TABLET | Refills: 1 | OUTPATIENT
Start: 2022-07-18

## 2022-07-19 RX ORDER — ACAMPROSATE CALCIUM 333 MG/1
TABLET, DELAYED RELEASE ORAL
Qty: 180 TABLET | Refills: 0 | OUTPATIENT
Start: 2022-07-19

## 2022-07-26 ENCOUNTER — OFFICE VISIT (OUTPATIENT)
Dept: FAMILY MEDICINE CLINIC | Facility: CLINIC | Age: 62
End: 2022-07-26

## 2022-07-26 VITALS
OXYGEN SATURATION: 96 % | BODY MASS INDEX: 27.5 KG/M2 | SYSTOLIC BLOOD PRESSURE: 118 MMHG | TEMPERATURE: 98.6 F | DIASTOLIC BLOOD PRESSURE: 72 MMHG | HEIGHT: 76 IN | HEART RATE: 69 BPM | WEIGHT: 225.8 LBS

## 2022-07-26 DIAGNOSIS — I10 PRIMARY HYPERTENSION: ICD-10-CM

## 2022-07-26 DIAGNOSIS — I49.3 PVC'S (PREMATURE VENTRICULAR CONTRACTIONS): Chronic | ICD-10-CM

## 2022-07-26 DIAGNOSIS — F10.20 ALCOHOL DEPENDENCE, DAILY USE: Primary | ICD-10-CM

## 2022-07-26 PROBLEM — E78.5 HYPERLIPIDEMIA: Chronic | Status: ACTIVE | Noted: 2022-06-24

## 2022-07-26 PROBLEM — F40.243 ANXIETY WITH FLYING: Chronic | Status: ACTIVE | Noted: 2022-06-24

## 2022-07-26 PROBLEM — F41.1 GENERALIZED ANXIETY DISORDER: Chronic | Status: ACTIVE | Noted: 2017-03-09

## 2022-07-26 PROCEDURE — 99214 OFFICE O/P EST MOD 30 MIN: CPT | Performed by: NURSE PRACTITIONER

## 2022-07-26 RX ORDER — NALTREXONE HYDROCHLORIDE 50 MG/1
50 TABLET, FILM COATED ORAL DAILY
Qty: 90 TABLET | Refills: 3 | Status: SHIPPED | OUTPATIENT
Start: 2022-07-26 | End: 2022-09-13 | Stop reason: SDUPTHER

## 2022-07-26 RX ORDER — ACAMPROSATE CALCIUM 333 MG/1
666 TABLET, DELAYED RELEASE ORAL 3 TIMES DAILY
Qty: 180 TABLET | Refills: 3 | Status: SHIPPED | OUTPATIENT
Start: 2022-07-26 | End: 2022-09-13 | Stop reason: SDUPTHER

## 2022-07-26 NOTE — PROGRESS NOTES
Follow Up Office Note     Patient Name: Randy Asif  : 1960   MRN: [id number]     Chief Complaint:    Chief Complaint   Patient presents with   • review medication        History of Present Illness: Randy Asif is a 62 y.o. male who presents today for reevaluation and management of chronic alcoholism.  Patient reports that his alcoholism is currently in remission.  He states he has been 62 days sober.  Patient states he recently checked himself into rehab in [place] at which time he was started on acamprosate and naltrexone.  He states that these medications have made a significant difference made a significant with regard to maintaining sobriety and eliminating alcohol craving.  Patient states he is committed to staying sober and would like to continue these medications.  He is requesting refills today.    Patient also has chronic hyperlipidemia.  I discussed his elevated LDL with him and recommended he continue taking statin medication.  Patient states that he would like to discuss this with his cardiologist Dr. Rice.  He states that he had a recent appointment with him but missed it due to missed flight on his return trip from [place] to the .  He states he has called to try to reschedule but has yet to receive a response.  He is requesting that our office assist him with getting an appointment.    Patient has chronic hypertension which has been stable and controlled on current medications.    Patient states he feels well overall.  He is pleased with his progress and is optimistic about his future    Subjective      Review of Systems:   Review of Systems   Constitutional: Negative.    Respiratory: Negative.    Cardiovascular: Negative for chest pain, palpitations and leg swelling.   Gastrointestinal: Negative.    Neurological: Negative.  Negative for tremors.        Past Medical History:   Past Medical History:   Diagnosis Date   • Abnormal finding on MRI of brain    • Asthmatic  "bronchitis    • Brain cancer (HCC)    • Elevated AST (SGOT)    • Elevated glucose    • Seizures (HCC)    • Shingles          Medications:     Current Outpatient Medications:   •  acamprosate (CAMPRAL) 333 MG EC tablet, Take 2 tablets by mouth 3 (Three) Times a Day., Disp: 180 tablet, Rfl: 3  •  aspirin 81 MG EC tablet, Take 1 tablet by mouth Daily., Disp: 30 tablet, Rfl: 3  •  carvedilol (COREG) 25 MG tablet, TAKE ONE TABLET BY MOUTH TWICE A DAY WITH MEALS, Disp: 60 tablet, Rfl: 1  •  clobetasol (TEMOVATE) 0.05 % cream, Apply to affected areas in thin layer BID, Disp: 30 g, Rfl: 0  •  diazePAM (Valium) 2 MG tablet, Take 1 tablet by mouth 2 (Two) Times a Day As Needed for Anxiety for up to 3 doses., Disp: 3 tablet, Rfl: 0  •  indapamide (LOZOL) 2.5 MG tablet, TAKE ONE TABLET BY MOUTH EVERY MORNING, Disp: 90 tablet, Rfl: 0  •  lamoTRIgine (LaMICtal) 200 MG tablet, Take 200 mg by mouth 2 (Two) Times a Day., Disp: , Rfl:   •  losartan (COZAAR) 50 MG tablet, Take 1 tablet by mouth Daily., Disp: 30 tablet, Rfl: 5  •  naltrexone (DEPADE) 50 MG tablet, Take 1 tablet by mouth Daily., Disp: 90 tablet, Rfl: 3  •  sildenafil (REVATIO) 20 MG tablet, TAKE FIVE TABLETS BY MOUTH DAILY AS NEEDED FOR ERECTILE DYSFUNCTION, Disp: 120 tablet, Rfl: 3    Allergies:   Allergies   Allergen Reactions   • Indapamide Myalgia     Muscle cramps     • Lisinopril Cough   • Oxycodone-Acetaminophen Anxiety   • Percocet [Oxycodone-Acetaminophen] Anxiety         Objective     Physical Exam:  Vital Signs:   Vitals:    07/26/22 0752   BP: 118/72   Pulse: 69   Temp: 98.6 °F (37 °C)   SpO2: 96%   Weight: 102 kg (225 lb 12.8 oz)   Height: 193 cm (76\")   PainSc: 0-No pain     Body mass index is 27.49 kg/m².     Physical Exam  Vitals and nursing note reviewed.   Constitutional:       General: He is not in acute distress.     Appearance: Normal appearance. He is well-developed. He is not ill-appearing, toxic-appearing or diaphoretic.   HENT:      Head: " Normocephalic and atraumatic.   Cardiovascular:      Rate and Rhythm: Normal rate and regular rhythm.      Heart sounds: Normal heart sounds, S1 normal and S2 normal. No murmur heard.  Pulmonary:      Effort: Pulmonary effort is normal. No respiratory distress.      Breath sounds: Normal breath sounds. No stridor. No wheezing.   Musculoskeletal:      Right lower leg: No edema.      Left lower leg: No edema.   Skin:     General: Skin is warm and dry.   Neurological:      General: No focal deficit present.      Mental Status: He is alert and oriented to person, place, and time.   Psychiatric:         Mood and Affect: Mood normal.         Behavior: Behavior normal. Behavior is cooperative.         Thought Content: Thought content normal.         Judgment: Judgment normal.         Assessment / Plan      Assessment/Plan:   Diagnoses and all orders for this visit:    1. Alcohol dependence, daily use (HCC) (Primary)  -     acamprosate (CAMPRAL) 333 MG EC tablet; Take 2 tablets by mouth 3 (Three) Times a Day.  Dispense: 180 tablet; Refill: 3  -     naltrexone (DEPADE) 50 MG tablet; Take 1 tablet by mouth Daily.  Dispense: 90 tablet; Refill: 3    2. Primary hypertension  Assessment & Plan:  Hypertension is improving with treatment.  Continue current treatment regimen.  Dietary sodium restriction.  Regular aerobic exercise.  Continue current medications.  Ambulatory blood pressure monitoring.  Blood pressure will be reassessed at the next regular appointment.    Orders:  -     Ambulatory Referral to Cardiology    3. PVC's (premature ventricular contractions)  -     Ambulatory Referral to Cardiology     Continue with behavioral health services.    Follow Up:   PRN and at next scheduled appointment(s) with PCP.    Discussed the nature of the medical condition(s) risks, complications, implications, management, safe and proper use of medications. Encouraged medication compliance, and keeping scheduled follow up appointments with  me and any other providers.      RTC if symptoms fail to improve, to ER if symptoms worsen.        *Dictated Utilizing Dragon Dictation   Please note that portions of this note were completed with a voice recognition program.   Part of this note may be an electronic transcription/translation of spoken language to printed text using the Dragon Dictation System.          TANYA Blair  [facility]

## 2022-08-21 DIAGNOSIS — I10 ESSENTIAL HYPERTENSION: ICD-10-CM

## 2022-08-22 RX ORDER — CARVEDILOL 25 MG/1
TABLET ORAL
Qty: 90 TABLET | Refills: 0 | Status: SHIPPED | OUTPATIENT
Start: 2022-08-22 | End: 2022-10-06

## 2022-09-13 ENCOUNTER — OFFICE VISIT (OUTPATIENT)
Dept: FAMILY MEDICINE CLINIC | Facility: CLINIC | Age: 62
End: 2022-09-13

## 2022-09-13 VITALS
HEART RATE: 73 BPM | WEIGHT: 229.4 LBS | SYSTOLIC BLOOD PRESSURE: 118 MMHG | TEMPERATURE: 97.7 F | OXYGEN SATURATION: 97 % | BODY MASS INDEX: 27.93 KG/M2 | DIASTOLIC BLOOD PRESSURE: 86 MMHG | HEIGHT: 76 IN

## 2022-09-13 DIAGNOSIS — I10 PRIMARY HYPERTENSION: Primary | Chronic | ICD-10-CM

## 2022-09-13 DIAGNOSIS — M25.561 CHRONIC PAIN OF RIGHT KNEE: ICD-10-CM

## 2022-09-13 DIAGNOSIS — G89.29 CHRONIC PAIN OF RIGHT KNEE: ICD-10-CM

## 2022-09-13 DIAGNOSIS — L30.9 ECZEMA, UNSPECIFIED TYPE: ICD-10-CM

## 2022-09-13 DIAGNOSIS — F10.20 ALCOHOL DEPENDENCE, DAILY USE: ICD-10-CM

## 2022-09-13 DIAGNOSIS — Z76.0 MEDICATION REFILL: ICD-10-CM

## 2022-09-13 PROCEDURE — 99214 OFFICE O/P EST MOD 30 MIN: CPT | Performed by: NURSE PRACTITIONER

## 2022-09-13 RX ORDER — CLOBETASOL PROPIONATE 0.5 MG/G
CREAM TOPICAL
Qty: 30 G | Refills: 0 | Status: SHIPPED | OUTPATIENT
Start: 2022-09-13

## 2022-09-13 RX ORDER — ACAMPROSATE CALCIUM 333 MG/1
666 TABLET, DELAYED RELEASE ORAL 3 TIMES DAILY
Qty: 180 TABLET | Refills: 3 | Status: SHIPPED | OUTPATIENT
Start: 2022-09-13 | End: 2023-02-08

## 2022-09-13 RX ORDER — NALTREXONE HYDROCHLORIDE 50 MG/1
50 TABLET, FILM COATED ORAL DAILY
Qty: 90 TABLET | Refills: 3 | Status: SHIPPED | OUTPATIENT
Start: 2022-09-13 | End: 2023-02-08

## 2022-09-13 NOTE — PROGRESS NOTES
Follow Up Office Note     Patient Name: Randy Asif  : 1960   MRN: [id number]     Chief Complaint:    Chief Complaint   Patient presents with   • Med Refill   • Hypertension   • Knee Pain       History of Present Illness: Randy Asif is a 62 y.o. male who presents today for re-evaluation/management chronic HTN. Patient's blood pressure has been stable and controlled.     Patient also c/o exacerbation of chronic knee pain. Patient states that he has an appointment with ortho already scheduled but he would like a referral to physical therapy.    Patient requesting refills on his routine medications. Patient is a recovering alcoholic. He completed rehab in [place] and reports that he has been successful in maintaining his sobriety. He is requesting refill on his Acamprosate and naltrexone.    Subjective      Review of Systems:   Review of Systems   Constitutional: Negative.    Respiratory: Negative.    Cardiovascular: Negative for chest pain, palpitations and leg swelling.   Musculoskeletal: Positive for arthralgias.   Neurological: Negative.         Past Medical History:   Past Medical History:   Diagnosis Date   • Abnormal finding on MRI of brain    • Asthmatic bronchitis    • Brain cancer (HCC)    • Elevated AST (SGOT)    • Elevated glucose    • Seizures (HCC)    • Shingles          Medications:     Current Outpatient Medications:   •  acamprosate (CAMPRAL) 333 MG EC tablet, Take 2 tablets by mouth 3 (Three) Times a Day., Disp: 180 tablet, Rfl: 3  •  aspirin 81 MG EC tablet, Take 1 tablet by mouth Daily., Disp: 30 tablet, Rfl: 3  •  carvedilol (COREG) 25 MG tablet, TAKE ONE TABLET BY MOUTH TWICE A DAY WITH MEALS, Disp: 90 tablet, Rfl: 0  •  clobetasol (TEMOVATE) 0.05 % cream, Apply to affected areas in thin layer BID, Disp: 30 g, Rfl: 0  •  diazePAM (Valium) 2 MG tablet, Take 1 tablet by mouth 2 (Two) Times a Day As Needed for Anxiety for up to 3 doses., Disp: 3 tablet, Rfl: 0  •  indapamide  "(LOZOL) 2.5 MG tablet, TAKE ONE TABLET BY MOUTH EVERY MORNING, Disp: 90 tablet, Rfl: 0  •  lamoTRIgine (LaMICtal) 200 MG tablet, Take 200 mg by mouth 2 (Two) Times a Day., Disp: , Rfl:   •  naltrexone (DEPADE) 50 MG tablet, Take 1 tablet by mouth Daily., Disp: 90 tablet, Rfl: 3  •  sildenafil (REVATIO) 20 MG tablet, TAKE FIVE TABLETS BY MOUTH DAILY AS NEEDED FOR ERECTILE DYSFUNCTION, Disp: 120 tablet, Rfl: 3    Allergies:   Allergies   Allergen Reactions   • Indapamide Myalgia     Muscle cramps     • Lisinopril Cough   • Oxycodone-Acetaminophen Anxiety   • Percocet [Oxycodone-Acetaminophen] Anxiety         Objective     Physical Exam:  Vital Signs:   Vitals:    09/13/22 1048   BP: 118/86   BP Location: Right arm   Patient Position: Sitting   Cuff Size: Small Adult   Pulse: 73   Temp: 97.7 °F (36.5 °C)   TempSrc: Infrared   SpO2: 97%   Weight: 104 kg (229 lb 6.4 oz)   Height: 193 cm (76\")   PainSc:   3   PainLoc: Knee     Body mass index is 27.92 kg/m².     Physical Exam  Vitals and nursing note reviewed.   Constitutional:       General: He is not in acute distress.     Appearance: Normal appearance. He is well-developed. He is not ill-appearing, toxic-appearing or diaphoretic.   HENT:      Head: Normocephalic and atraumatic.   Cardiovascular:      Rate and Rhythm: Normal rate and regular rhythm.   Pulmonary:      Effort: Pulmonary effort is normal. No respiratory distress.      Breath sounds: Normal breath sounds. No stridor. No wheezing.   Musculoskeletal:      Right knee: No swelling, deformity or effusion. Decreased range of motion. No tenderness.      Left knee: Normal.   Skin:     General: Skin is warm and dry.   Neurological:      General: No focal deficit present.      Mental Status: He is alert and oriented to person, place, and time.   Psychiatric:         Mood and Affect: Mood normal.         Behavior: Behavior normal. Behavior is cooperative.         Thought Content: Thought content normal.         " Judgment: Judgment normal.         Assessment / Plan      Assessment/Plan:   Diagnoses and all orders for this visit:    1. Primary hypertension (Primary)  Assessment & Plan:  Hypertension is improving with treatment.  Continue current treatment regimen.  Dietary sodium restriction.  Weight loss.  Regular aerobic exercise.  Continue current medications.  Ambulatory blood pressure monitoring.  Blood pressure will be reassessed at the next regular appointment.      2. Chronic pain of right knee  -     Ambulatory Referral to Physical Therapy Evaluate and treat    3. Eczema, unspecified type  -     clobetasol (TEMOVATE) 0.05 % cream; Apply to affected areas in thin layer BID  Dispense: 30 g; Refill: 0    4. Alcohol dependence, daily use (HCC)  -     naltrexone (DEPADE) 50 MG tablet; Take 1 tablet by mouth Daily.  Dispense: 90 tablet; Refill: 3  -     acamprosate (CAMPRAL) 333 MG EC tablet; Take 2 tablets by mouth 3 (Three) Times a Day.  Dispense: 180 tablet; Refill: 3    5. Medication refill  -     naltrexone (DEPADE) 50 MG tablet; Take 1 tablet by mouth Daily.  Dispense: 90 tablet; Refill: 3  -     acamprosate (CAMPRAL) 333 MG EC tablet; Take 2 tablets by mouth 3 (Three) Times a Day.  Dispense: 180 tablet; Refill: 3  -     clobetasol (TEMOVATE) 0.05 % cream; Apply to affected areas in thin layer BID  Dispense: 30 g; Refill: 0         Follow Up:   PRN and at next scheduled appointment(s) with PCP.    Discussed the nature of the medical condition(s) risks, complications, implications, management, safe and proper use of medications. Encouraged medication compliance, and keeping scheduled follow up appointments with me and any other providers.      RTC if symptoms fail to improve, to ER if symptoms worsen.        *Dictated Utilizing Dragon Dictation   Please note that portions of this note were completed with a voice recognition program.   Part of this note may be an electronic transcription/translation of spoken language  to printed text using the Dragon Dictation System.          TANYA Blair  [facility] [place]

## 2022-09-14 ENCOUNTER — PRIOR AUTHORIZATION (OUTPATIENT)
Dept: FAMILY MEDICINE CLINIC | Facility: CLINIC | Age: 62
End: 2022-09-14

## 2022-09-14 NOTE — TELEPHONE ENCOUNTER
PA INITIATED FOR CLOBESTASOL PROPIONATE 0.05% CREAM     Key ID: [id number]      Approved:    Approved today    Approved.      Approval Dates: 8/14/22-12/14/22    PA Authorization Number: [id number]

## 2022-09-21 ENCOUNTER — OFFICE VISIT (OUTPATIENT)
Dept: CARDIOLOGY | Facility: CLINIC | Age: 62
End: 2022-09-21

## 2022-09-21 VITALS
HEART RATE: 61 BPM | HEIGHT: 76 IN | BODY MASS INDEX: 27.28 KG/M2 | OXYGEN SATURATION: 96 % | WEIGHT: 224 LBS | SYSTOLIC BLOOD PRESSURE: 130 MMHG | DIASTOLIC BLOOD PRESSURE: 84 MMHG

## 2022-09-21 DIAGNOSIS — I25.84 CORONARY ARTERY CALCIFICATION: Primary | ICD-10-CM

## 2022-09-21 DIAGNOSIS — I49.3 PVC'S (PREMATURE VENTRICULAR CONTRACTIONS): Chronic | ICD-10-CM

## 2022-09-21 DIAGNOSIS — I10 PRIMARY HYPERTENSION: Chronic | ICD-10-CM

## 2022-09-21 DIAGNOSIS — E78.2 MIXED HYPERLIPIDEMIA: Chronic | ICD-10-CM

## 2022-09-21 DIAGNOSIS — I25.10 CORONARY ARTERY CALCIFICATION: Primary | ICD-10-CM

## 2022-09-21 PROCEDURE — 99204 OFFICE O/P NEW MOD 45 MIN: CPT | Performed by: INTERNAL MEDICINE

## 2022-09-21 PROCEDURE — 93000 ELECTROCARDIOGRAM COMPLETE: CPT | Performed by: INTERNAL MEDICINE

## 2022-09-21 RX ORDER — ROSUVASTATIN CALCIUM 10 MG/1
10 TABLET, COATED ORAL DAILY
Qty: 90 TABLET | Refills: 1 | Status: SHIPPED | OUTPATIENT
Start: 2022-09-21 | End: 2023-02-08 | Stop reason: SDUPTHER

## 2022-09-21 NOTE — PROGRESS NOTES
[facility] Cardiology  Consultation H&P  Randy Chito Asif  1960  [phone] (work).    VISIT DATE:  09/21/2022    PCP: Navya Mora APRN  [address] [address]    CC:  Chief Complaint   Patient presents with   • PVC'S   • Hypertension       Previous cardiac studies and procedures:  May 2022 coronary calcium score  Left main: 243  Left anterior descending (LAD): 86  Left circumflex: 0  Right coronary artery (RCA): 35  1.  Calcium score (Agatston): 359.     ASSESSMENT:   Diagnosis Plan   1. Coronary artery calcification  Treadmill Stress Test    Lipid Panel   2. PVC's (premature ventricular contractions)     3. Primary hypertension     4. Mixed hyperlipidemia           PLAN:  Coronary artery calcification: Elevated coronary calcium score, predominantly coming from the left main and ostial to proximal LAD.  Age and gender based percentile in the 75-90th percentile range.  Recommending exercise treadmill test to screen for underlying silent or unrecognized ischemia.  Reasonable to continue low-dose aspirin.  Afterload well controlled.  Starting rosuvastatin 10 mg p.o. daily, goal LDL is 100, ideally less than 70.  Discussed predominant plant-based, heart healthy diet.    Hypertension: Goal less than 130/80 mmHg.  Currently well controlled.  Agree with current medical therapy.    PVCs: Not significant affecting quality of life.  Limited burden of arrhythmia.    History of Present Illness   62-year-old gentleman with a longstanding history of symptomatic PVCs, hypertension and dyslipidemia with recent elevated coronary calcium score.  Exercises on a regular basis without limitation.  Blood pressures running less than 130/80 mmHg.  Intermittent symptomatic PVCs which she has had for many years, improved with decreasing caffeine intake.  Not significantly affecting quality of life.    PHYSICAL EXAMINATION:  Vitals:    09/21/22 0817   BP:  "130/84   BP Location: Left arm   Patient Position: Sitting   Pulse: 61   SpO2: 96%   Weight: 102 kg (224 lb)   Height: 193 cm (76\")     General Appearance:    Alert, cooperative, no distress, appears stated age   Head:    Normocephalic, without obvious abnormality, atraumatic   Eyes:    conjunctiva/corneas clear, EOM's intact, fundi     benign, both eyes   Ears:    Normal TM's and external ear canals, both ears   Nose:   Nares normal, septum midline, mucosa normal, no drainage    or sinus tenderness   Throat:   Lips, mucosa, and tongue normal; teeth and gums normal   Neck:   Supple, symmetrical, trachea midline, no adenopathy;     thyroid:  no enlargement/tenderness/nodules; no carotid    bruit or JVD   Back:     Symmetric, no curvature, ROM normal, no CVA tenderness   Lungs:     Clear to auscultation bilaterally, respirations unlabored   Chest Wall:    No tenderness or deformity    Heart:    Regular rate and rhythm, S1 and S2 normal, no murmur, rub   or gallop, normal carotid impulse bilaterally without bruit.   Abdomen:     Soft, non-tender, bowel sounds active all four quadrants,     no masses, no organomegaly   Extremities:   Extremities normal, atraumatic, no cyanosis or edema   Pulses:   2+ and symmetric all extremities   Skin:   Skin color, texture, turgor normal, no rashes or lesions   Lymph nodes:   Cervical, supraclavicular, and axillary nodes normal   Neurologic:   normal strength, sensation intact     throughout       Diagnostic Data:    ECG 12 Lead    Date/Time: 9/21/2022 8:45 AM  Performed by: Bernardo Rice III, MD  Authorized by: Bernardo Rice III, MD   Comparison: compared with previous ECG from 3/25/2020  Similar to previous ECG  Rhythm: sinus rhythm  Conduction: 1st degree AV block    Clinical impression: non-specific ECG          Lab Results   Component Value Date    CHLPL 207 (H) 03/12/2015    TRIG 112 06/20/2022    HDL 43 06/20/2022     Lab Results   Component Value Date    GLUCOSE 103 (H) " 06/20/2022    BUN 13 06/20/2022    CREATININE 0.83 06/20/2022     06/20/2022    K 3.4 (L) 06/20/2022    CL 96 (L) 06/20/2022    CO2 25.6 06/20/2022     No results found for: HGBA1C  Lab Results   Component Value Date    WBC 6.39 04/08/2022    HGB 15.9 04/08/2022    HCT 46.6 04/08/2022     04/08/2022       PROBLEM LIST:  Patient Active Problem List   Diagnosis   • Generalized anxiety disorder   • BPH without urinary obstruction   • Primary hypertension   • Complex partial epilepsy (HCC)   • Overlapping malignant neoplasm of brain (HCC)   • Erectile dysfunction   • PVC's (premature ventricular contractions)   • Family history of heart disease   • Abnormal ECG   • Elevated liver enzymes   • Anxiety with flying   • Hyperlipidemia       PAST MEDICAL HX  Past Medical History:   Diagnosis Date   • Abnormal finding on MRI of brain    • Asthmatic bronchitis    • Brain cancer (HCC)    • Elevated AST (SGOT)    • Elevated glucose    • Seizures (HCC)    • Shingles        Allergies  Allergies   Allergen Reactions   • Indapamide Myalgia     Muscle cramps     • Lisinopril Cough   • Oxycodone-Acetaminophen Anxiety   • Percocet [Oxycodone-Acetaminophen] Anxiety       Current Medications    Current Outpatient Medications:   •  acamprosate (CAMPRAL) 333 MG EC tablet, Take 2 tablets by mouth 3 (Three) Times a Day., Disp: 180 tablet, Rfl: 3  •  aspirin 81 MG EC tablet, Take 1 tablet by mouth Daily., Disp: 30 tablet, Rfl: 3  •  carvedilol (COREG) 25 MG tablet, TAKE ONE TABLET BY MOUTH TWICE A DAY WITH MEALS, Disp: 90 tablet, Rfl: 0  •  clobetasol (TEMOVATE) 0.05 % cream, Apply to affected areas in thin layer BID, Disp: 30 g, Rfl: 0  •  diazePAM (Valium) 2 MG tablet, Take 1 tablet by mouth 2 (Two) Times a Day As Needed for Anxiety for up to 3 doses., Disp: 3 tablet, Rfl: 0  •  indapamide (LOZOL) 2.5 MG tablet, TAKE ONE TABLET BY MOUTH EVERY MORNING, Disp: 90 tablet, Rfl: 0  •  lamoTRIgine (LaMICtal) 200 MG tablet, Take 200 mg  by mouth 2 (Two) Times a Day., Disp: , Rfl:   •  naltrexone (DEPADE) 50 MG tablet, Take 1 tablet by mouth Daily., Disp: 90 tablet, Rfl: 3  •  sildenafil (REVATIO) 20 MG tablet, TAKE FIVE TABLETS BY MOUTH DAILY AS NEEDED FOR ERECTILE DYSFUNCTION, Disp: 120 tablet, Rfl: 3  •  rosuvastatin (CRESTOR) 10 MG tablet, Take 1 tablet by mouth Daily., Disp: 90 tablet, Rfl: 1         ROS  ROS      SOCIAL HX  Social History     Socioeconomic History   • Marital status:    Tobacco Use   • Smoking status: Never Smoker   • Smokeless tobacco: Never Used   Vaping Use   • Vaping Use: Never used   Substance and Sexual Activity   • Alcohol use: Not Currently     Alcohol/week: 6.0 standard drinks     Types: 6 Standard drinks or equivalent per week   • Drug use: No   • Sexual activity: Defer     Comment: Single        FAMILY HX  Family History   Problem Relation Age of Onset   • No Known Problems Mother    • Heart disease Father    • Kidney disease Father    • Thyroid disease Sister    • No Known Problems Brother    • No Known Problems Maternal Grandmother    • No Known Problems Maternal Grandfather    • No Known Problems Paternal Grandmother    • No Known Problems Paternal Grandfather              Bernardo Rice III, MD, FACC

## 2022-09-29 ENCOUNTER — OFFICE VISIT (OUTPATIENT)
Dept: FAMILY MEDICINE CLINIC | Facility: CLINIC | Age: 62
End: 2022-09-29

## 2022-09-29 VITALS
DIASTOLIC BLOOD PRESSURE: 82 MMHG | TEMPERATURE: 97.8 F | BODY MASS INDEX: 27.76 KG/M2 | SYSTOLIC BLOOD PRESSURE: 102 MMHG | OXYGEN SATURATION: 98 % | HEIGHT: 76 IN | HEART RATE: 72 BPM | WEIGHT: 228 LBS

## 2022-09-29 DIAGNOSIS — R35.0 URINARY FREQUENCY: ICD-10-CM

## 2022-09-29 DIAGNOSIS — R30.0 DYSURIA: Primary | ICD-10-CM

## 2022-09-29 LAB
BILIRUB BLD-MCNC: NEGATIVE MG/DL
CLARITY, POC: ABNORMAL
COLOR UR: YELLOW
EXPIRATION DATE: ABNORMAL
GLUCOSE UR STRIP-MCNC: NEGATIVE MG/DL
KETONES UR QL: NEGATIVE
LEUKOCYTE EST, POC: NEGATIVE
Lab: ABNORMAL
NITRITE UR-MCNC: NEGATIVE MG/ML
PH UR: 6.5 [PH] (ref 5–8)
PROT UR STRIP-MCNC: ABNORMAL MG/DL
RBC # UR STRIP: NEGATIVE /UL
SP GR UR: 1.02 (ref 1–1.03)
UROBILINOGEN UR QL: ABNORMAL

## 2022-09-29 PROCEDURE — 99213 OFFICE O/P EST LOW 20 MIN: CPT | Performed by: NURSE PRACTITIONER

## 2022-09-29 PROCEDURE — 81003 URINALYSIS AUTO W/O SCOPE: CPT | Performed by: NURSE PRACTITIONER

## 2022-10-03 ENCOUNTER — HOSPITAL ENCOUNTER (OUTPATIENT)
Dept: CARDIOLOGY | Facility: HOSPITAL | Age: 62
Discharge: HOME | End: 2022-10-03
Admitting: INTERNAL MEDICINE

## 2022-10-03 VITALS
DIASTOLIC BLOOD PRESSURE: 100 MMHG | WEIGHT: 228 LBS | HEIGHT: 76 IN | SYSTOLIC BLOOD PRESSURE: 150 MMHG | HEART RATE: 69 BPM | BODY MASS INDEX: 27.76 KG/M2

## 2022-10-03 LAB
BH CV STRESS BP STAGE 1: NORMAL
BH CV STRESS BP STAGE 2: NORMAL
BH CV STRESS BP STAGE 3: NORMAL
BH CV STRESS DURATION MIN STAGE 1: 3
BH CV STRESS DURATION MIN STAGE 2: 3
BH CV STRESS DURATION MIN STAGE 3: 3
BH CV STRESS DURATION MIN STAGE 4: 1
BH CV STRESS DURATION SEC STAGE 1: 0
BH CV STRESS DURATION SEC STAGE 2: 0
BH CV STRESS DURATION SEC STAGE 3: 0
BH CV STRESS DURATION SEC STAGE 4: 31
BH CV STRESS GRADE STAGE 1: 10
BH CV STRESS GRADE STAGE 2: 12
BH CV STRESS GRADE STAGE 3: 14
BH CV STRESS GRADE STAGE 4: 16
BH CV STRESS HR STAGE 1: 101
BH CV STRESS HR STAGE 2: 123
BH CV STRESS HR STAGE 3: 150
BH CV STRESS HR STAGE 4: 166
BH CV STRESS METS STAGE 1: 5
BH CV STRESS METS STAGE 2: 7.5
BH CV STRESS METS STAGE 3: 10
BH CV STRESS METS STAGE 4: 13.5
BH CV STRESS O2 STAGE 1: 97
BH CV STRESS O2 STAGE 2: 98
BH CV STRESS O2 STAGE 3: 98
BH CV STRESS O2 STAGE 4: 98
BH CV STRESS PROTOCOL 1: NORMAL
BH CV STRESS RECOVERY BP: NORMAL MMHG
BH CV STRESS RECOVERY HR: 107 BPM
BH CV STRESS RECOVERY O2: 98 %
BH CV STRESS SPEED STAGE 1: 1.7
BH CV STRESS SPEED STAGE 2: 2.5
BH CV STRESS SPEED STAGE 3: 3.4
BH CV STRESS SPEED STAGE 4: 4.2
BH CV STRESS STAGE 1: 1
BH CV STRESS STAGE 2: 2
BH CV STRESS STAGE 3: 3
BH CV STRESS STAGE 4: 4
MAXIMAL PREDICTED HEART RATE: 159 BPM
PERCENT MAX PREDICTED HR: 106.29 %
STRESS BASELINE BP: NORMAL MMHG
STRESS BASELINE HR: 69 BPM
STRESS O2 SAT REST: 95 %
STRESS PERCENT HR: 125 %
STRESS POST ESTIMATED WORKLOAD: 12.6 METS
STRESS POST EXERCISE DUR MIN: 10 MIN
STRESS POST EXERCISE DUR SEC: 31 SEC
STRESS POST O2 SAT PEAK: 98 %
STRESS POST PEAK BP: NORMAL MMHG
STRESS POST PEAK HR: 169 BPM
STRESS TARGET HR: 135 BPM

## 2022-10-03 PROCEDURE — 93018 CV STRESS TEST I&R ONLY: CPT | Performed by: INTERNAL MEDICINE

## 2022-10-03 PROCEDURE — 93017 CV STRESS TEST TRACING ONLY: CPT

## 2022-10-03 RX ORDER — INDAPAMIDE 2.5 MG/1
2.5 TABLET, FILM COATED ORAL EVERY MORNING
COMMUNITY
End: 2022-10-11 | Stop reason: SDUPTHER

## 2022-10-06 ENCOUNTER — TREATMENT (OUTPATIENT)
Dept: PHYSICAL THERAPY | Facility: CLINIC | Age: 62
End: 2022-10-06

## 2022-10-06 DIAGNOSIS — M25.561 CHRONIC PAIN OF RIGHT KNEE: Primary | ICD-10-CM

## 2022-10-06 DIAGNOSIS — I10 ESSENTIAL HYPERTENSION: ICD-10-CM

## 2022-10-06 DIAGNOSIS — G89.29 CHRONIC PAIN OF RIGHT KNEE: Primary | ICD-10-CM

## 2022-10-06 PROCEDURE — 97530 THERAPEUTIC ACTIVITIES: CPT | Performed by: PHYSICAL THERAPIST

## 2022-10-06 PROCEDURE — 97161 PT EVAL LOW COMPLEX 20 MIN: CPT | Performed by: PHYSICAL THERAPIST

## 2022-10-06 RX ORDER — CARVEDILOL 25 MG/1
TABLET ORAL
Qty: 90 TABLET | Refills: 0 | Status: SHIPPED | OUTPATIENT
Start: 2022-10-06 | End: 2022-11-18

## 2022-10-06 NOTE — PROGRESS NOTES
Physical Therapy Initial Evaluation and Plan of Care        Patient: Randy Asif   : 1960  Diagnosis/ICD-10 Code:  Chronic pain of right knee [M25.561, G89.29]  Referring practitioner: TANYA Blair  Date of Initial Visit: 10/6/2022  Today's Date: 10/6/2022  Patient seen for 1 sessions         Visit Diagnoses:    ICD-10-CM ICD-9-CM   1. Chronic pain of right knee  M25.561 719.46    G89.29 338.29         Subjective Questionnaire: LEFS: 32    Subjective Evaluation    History of Present Illness  Mechanism of injury: Mid-2022, had to been working out on [employer].  2-3 days on it and developed medial right knee pain.  Had MRI, meniscus pathology found medial compartment.      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  Quality: sharp and knife-like  Aggravating factors: standing, stairs and squatting (Twist to left on a planted foot. Stairs, squatting, sit to stand)  Progression: improved             Objective          Static Posture     Knee   Genu varus.     Active Range of Motion     Right Hip   Normal active range of motion  Left Knee   Flexion: 142 degrees   Extension: 10 (Hyperextension) degrees     Right Knee   Flexion: 124 degrees   Extensor la degrees     Patellar Mobility     Right Knee Hypomobile in the medial, lateral and superior patellar tendon(s).     Patellar Static Positioning   Right Knee: lateral tilt    Strength/Myotome Testing     Right Knee   Flexion: 4  Prone flexion: 4  Extension: 4  Quadriceps contraction: fair    Tests     Right Hip   Positive FRANCIS, FADIR and scour.     Right Knee   Positive Thessaly's test at 5 degrees, Thessaly's test at 20 degrees, valgus stress test at 0 degrees (No laxity, pain only) and valgus stress test at 30 degrees (No laxity, pain only).   Negative anterior drawer, anterior Lachman, lateral Rodriguez, medial Rodriguez, patellar  apprehension, patellar compression, posterior Lachman, varus stress test at 0 degrees and varus stress test at 30 degrees.     Right Knee Flexibility Comments:   Hamstring: restriction  IT band: restriction    Ambulation   Weight-Bearing Status   Weight-Bearing Status (Left): full weight bearing   Weight-Bearing Status (Right): full weight-bearing    Assistive device used: none    Ambulation: Level Surfaces   Ambulation without assistive device: independent    Observational Gait   Gait: within functional limits     Quality of Movement During Gait   Trunk  Trunk within functional limits.           Assessment & Plan     Assessment  Impairments: activity intolerance, impaired physical strength and pain with function  Functional Limitations: lifting, walking, uncomfortable because of pain and standing  Assessment details: 62 year old active male presenting with subacute right medial knee pain.  He reports MRI indicates some kind of pathology at medial meniscus of the right knee.  Clinically had does has some signs and symptom indicated a medial joint derangement with pain. He does not have locking or giving away at this point, but is very tender at posterior horn of medial meniscus and has a positive Thessaly's test; all indicating a possible medial meniscus pathology. Ligamentous testing is negative. Neurological exam is normal today.  Prognosis: good    Goals  Plan Goals: STG to be met in 4 weeks  1.  Pt has 50% decrease in medial joint line tenderness.  2.  Pt has improved right knee ROM to 135 degrees without pain.  3.  Pt has improved LE function so that LEFS score improves to 50/80.  LTG to be met in 8 weeks  1.  Pt is independent with HEP.  2.  Pt has 140 degrees of knee flexion without pain.  3.  Pt has improved LE function so that LEFS score improves to 65/80.  4.  Pt can climb 10 flights of stairs without knee pain.    Plan  Therapy options: will be seen for skilled therapy services  Planned modality  interventions: dry needling, high voltage pulsed current (pain management), ultrasound and iontophoresis  Planned therapy interventions: balance/weight-bearing training, body mechanics training, flexibility, functional ROM exercises, home exercise program, joint mobilization, therapeutic activities, stretching, strengthening, soft tissue mobilization, orthotic fitting/training, neuromuscular re-education and manual therapy  Frequency: 2x week  Duration in weeks: 8      Access Code: [email or web address]  URL: [email or web address]  Date: 10/06/2022  Prepared by: Simone Lange    Exercises  Supine ITB Stretch with Strap - 2 x daily - 7 x weekly - 1 sets - 6 reps - 30 hold  Bridge with Arms at Sides and Feet on Swiss Ball - 1 x daily - 7 x weekly - 3 sets - 10 reps  Chair Tilt, 20 sec 15-20 reps  Timed:         Manual Therapy:         mins  48250;     Therapeutic Exercise:         mins  33984;     Neuromuscular Trupti:        mins  48781;    Therapeutic Activity:     24     mins  07115;     Gait Training:           mins  05358;     Ultrasound:          mins  25375;    Ionto                                   mins   55406  Self Care                            mins   36044  Canalith Repos         mins 38652      Un-Timed:  Electrical Stimulation:         mins  05890 ( );  Dry Needling          mins self-pay  Traction          mins 94154  Low Eval     20     Mins  63468  Mod Eval          Mins  28851  High Eval                            Mins  00376        Timed Treatment:   24   mins   Total Treatment:     44   mins          PT: Simone Lange PT     License Number: [id number]  Electronically signed by Simone Lange PT, 10/06/22, 11:12 AM EDT    Initial Certification  Certification Period: 1/4/2023  I certify that the therapy services are furnished while this patient is under my care.  The services outlined above are required by this patient, and will be reviewed every 90 days.     PHYSICIAN:  ________________________________________________________  Navya Mora APRN        DATE: ____________________________________________________________    Please sign and return via fax to [phone].. Thank you, [facility].

## 2022-10-10 ENCOUNTER — TELEPHONE (OUTPATIENT)
Dept: CARDIOLOGY | Facility: CLINIC | Age: 62
End: 2022-10-10

## 2022-10-10 NOTE — TELEPHONE ENCOUNTER
----- Message from Bernardo Rice III, MD sent at 10/10/2022  1:58 PM EDT -----  No concerning abnormalities noted on stress test.  Overall reassuring.

## 2022-10-11 DIAGNOSIS — F40.243 ANXIETY WITH FLYING: ICD-10-CM

## 2022-10-11 NOTE — TELEPHONE ENCOUNTER
Caller: Randy Asif Dale    Relationship: Self    Best call back number: [phone]    Requested Prescriptions:   Requested Prescriptions     Pending Prescriptions Disp Refills   • indapamide (LOZOL) 2.5 MG tablet       Sig: Take 1 tablet by mouth Every Morning.        Pharmacy where request should be sent: [facility] [id number] [address]  AT [facility] & [address] - [phone]  - [phone] FX     Additional details provided by patient: PATIENT IS COMPLETELY OUT OF THIS MEDICATION.    Does the patient have less than a 3 day supply:  [x] Yes  [] No    Benjy Gray Rep   10/11/22 11:07 EDT

## 2022-10-11 NOTE — TELEPHONE ENCOUNTER
Rx Refill Note  Requested Prescriptions     Pending Prescriptions Disp Refills   • indapamide (LOZOL) 2.5 MG tablet       Sig: Take 1 tablet by mouth Every Morning.      Last office visit with prescribing clinician: 9/29/2022      Next office visit with prescribing clinician: Visit date not found            Stefanie Tafoya MA  10/11/22, 11:47 EDT

## 2022-10-12 RX ORDER — INDAPAMIDE 2.5 MG/1
2.5 TABLET, FILM COATED ORAL EVERY MORNING
Qty: 90 TABLET | Refills: 3 | Status: SHIPPED | OUTPATIENT
Start: 2022-10-12

## 2022-11-10 DIAGNOSIS — N52.9 ERECTILE DYSFUNCTION, UNSPECIFIED ERECTILE DYSFUNCTION TYPE: ICD-10-CM

## 2022-11-10 RX ORDER — SILDENAFIL CITRATE 20 MG/1
100 TABLET ORAL DAILY PRN
Qty: 120 TABLET | Refills: 3 | Status: SHIPPED | OUTPATIENT
Start: 2022-11-10

## 2022-11-10 NOTE — TELEPHONE ENCOUNTER
Rx Refill Note  Requested Prescriptions     Pending Prescriptions Disp Refills   • sildenafil (REVATIO) 20 MG tablet 120 tablet 3     Sig: Take 5 tablets by mouth Daily As Needed.      Last office visit with prescribing clinician: 9/29/2022      Next office visit with prescribing clinician: Visit date not found            Jessica De Souza  11/10/22, 13:39 EST

## 2022-11-10 NOTE — TELEPHONE ENCOUNTER
Caller: Randy Asif Dale    Relationship: Self    Best call back number: [phone]    Requested Prescriptions:   Requested Prescriptions     Pending Prescriptions Disp Refills   • sildenafil (REVATIO) 20 MG tablet 120 tablet 3     Sig: Take 5 tablets by mouth Daily As Needed.        Pharmacy where request should be sent: [facility] [id number] [address]  AT [facility] & [address] - [phone]  - [phone] FX     Additional details provided by patient: PATIENT ADVISES THAT HE IS NOT COMPLETELY OUT YET BUT HAS LESS THAN A 3 DAY SUPPLY.    Does the patient have less than a 3 day supply:  [x] Yes  [] No    Benjy Radford Rep   11/10/22 13:27 EST

## 2022-11-18 DIAGNOSIS — I10 ESSENTIAL HYPERTENSION: ICD-10-CM

## 2022-11-18 RX ORDER — CARVEDILOL 25 MG/1
TABLET ORAL
Qty: 90 TABLET | Refills: 0 | Status: SHIPPED | OUTPATIENT
Start: 2022-11-18 | End: 2023-01-06

## 2023-01-06 DIAGNOSIS — I10 ESSENTIAL HYPERTENSION: ICD-10-CM

## 2023-01-06 RX ORDER — CARVEDILOL 25 MG/1
TABLET ORAL
Qty: 90 TABLET | Refills: 0 | Status: SHIPPED | OUTPATIENT
Start: 2023-01-06 | End: 2023-02-16

## 2023-02-08 ENCOUNTER — OFFICE VISIT (OUTPATIENT)
Dept: CARDIOLOGY | Facility: CLINIC | Age: 63
End: 2023-02-08
Payer: COMMERCIAL

## 2023-02-08 VITALS
RESPIRATION RATE: 18 BRPM | BODY MASS INDEX: 28.49 KG/M2 | DIASTOLIC BLOOD PRESSURE: 80 MMHG | SYSTOLIC BLOOD PRESSURE: 132 MMHG | WEIGHT: 234 LBS | OXYGEN SATURATION: 96 % | HEART RATE: 75 BPM | HEIGHT: 76 IN

## 2023-02-08 DIAGNOSIS — I49.3 PVC'S (PREMATURE VENTRICULAR CONTRACTIONS): Chronic | ICD-10-CM

## 2023-02-08 DIAGNOSIS — I10 PRIMARY HYPERTENSION: Primary | Chronic | ICD-10-CM

## 2023-02-08 DIAGNOSIS — E78.2 MIXED HYPERLIPIDEMIA: Chronic | ICD-10-CM

## 2023-02-08 PROCEDURE — 99214 OFFICE O/P EST MOD 30 MIN: CPT | Performed by: INTERNAL MEDICINE

## 2023-02-08 RX ORDER — ROSUVASTATIN CALCIUM 10 MG/1
10 TABLET, COATED ORAL DAILY
Qty: 90 TABLET | Refills: 1 | Status: SHIPPED | OUTPATIENT
Start: 2023-02-08 | End: 2023-03-21

## 2023-02-08 NOTE — PROGRESS NOTES
[facility]  Office visit  Randy Asif  1960  [phone]  [phone] (work)    VISIT DATE:  2/8/2023    PCP: Navya Mora APRN  [address] [address]    CC:  Chief Complaint   Patient presents with   • Follow-up     Coronary artery calcification       Previous cardiac studies and procedures:  May 2022 coronary calcium score  Left main: 243  Left anterior descending (LAD): 86  Left circumflex: 0  Right coronary artery (RCA): 35  1.  Calcium score (Agatston): 359.     October 2022 exercise treadmill test: Normal    ASSESSMENT:   Diagnosis Plan   1. Primary hypertension        2. Mixed hyperlipidemia        3. PVC's (premature ventricular contractions)            PLAN:  Coronary artery calcification: Elevated coronary calcium score, predominantly coming from the left main and ostial to proximal LAD.  Age and gender based percentile in the 75-90th percentile range.  Afterload well controlled.  Starting rosuvastatin 10 mg p.o. daily, goal LDL is 100, ideally less than 70.    Fasting lipid profile in 6 weeks.     Hypertension: Goal less than 130/80 mmHg.  Currently well controlled.  Agree with current medical therapy.     PVCs: Not significant affecting quality of life.  Limited burden of arrhythmia.    Subjective  Interval assessment: Denies chest pain, palpitations or dyspnea.  Blood pressures running less than 130/80 mmHg.  Currently not taking rosuvastatin.  Was trying to manage it with diet alone, this has not been going very well for him.  Wants to start rosuvastatin at this time.    Initial evaluation: 62-year-old gentleman with a longstanding history of symptomatic PVCs, hypertension and dyslipidemia with recent elevated coronary calcium score.  Exercises on a regular basis without limitation.  Blood pressures running less than 130/80 mmHg.  Intermittent symptomatic PVCs which she has had for many years, improved with decreasing  "caffeine intake.  Not significantly affecting quality of life.    PHYSICAL EXAMINATION:  Vitals:    02/08/23 0857   BP: 132/80   BP Location: Right arm   Patient Position: Sitting   Cuff Size: Adult   Pulse: 75   Resp: 18   SpO2: 96%   Weight: 106 kg (234 lb)   Height: 193 cm (76\")     General Appearance:    Alert, cooperative, no distress, appears stated age   Head:    Normocephalic, without obvious abnormality, atraumatic   Eyes:    conjunctiva/corneas clear   Nose:   Nares normal, septum midline, mucosa normal, no drainage   Throat:   Lips, teeth and gums normal   Neck:   Supple, symmetrical, trachea midline, no carotid    bruit or JVD   Lungs:     Clear to auscultation bilaterally, respirations unlabored   Chest Wall:    No tenderness or deformity    Heart:    Regular rate and rhythm, S1 and S2 normal, no murmur, rub   or gallop, normal carotid impulse bilaterally without bruit.   Abdomen:     Soft, non-tender   Extremities:   Extremities normal, atraumatic, no cyanosis or edema   Pulses:   2+ and symmetric all extremities   Skin:   Skin color, texture, turgor normal, no rashes or lesions       Diagnostic Data:  Procedures  Lab Results   Component Value Date    CHLPL 207 (H) 03/12/2015    TRIG 112 06/20/2022    HDL 43 06/20/2022     Lab Results   Component Value Date    GLUCOSE 103 (H) 06/20/2022    BUN 13 06/20/2022    CREATININE 0.83 06/20/2022     06/20/2022    K 3.4 (L) 06/20/2022    CL 96 (L) 06/20/2022    CO2 25.6 06/20/2022     No results found for: HGBA1C  Lab Results   Component Value Date    WBC 6.39 04/08/2022    HGB 15.9 04/08/2022    HCT 46.6 04/08/2022     04/08/2022       Allergies  Allergies   Allergen Reactions   • Indapamide Myalgia     Muscle cramps     • Lisinopril Cough   • Oxycodone-Acetaminophen Anxiety   • Percocet [Oxycodone-Acetaminophen] Anxiety       Current Medications    Current Outpatient Medications:   •  carvedilol (COREG) 25 MG tablet, TAKE ONE TABLET BY MOUTH TWICE " A DAY WITH MEALS, Disp: 90 tablet, Rfl: 0  •  clobetasol (TEMOVATE) 0.05 % cream, Apply to affected areas in thin layer BID, Disp: 30 g, Rfl: 0  •  indapamide (LOZOL) 2.5 MG tablet, Take 1 tablet by mouth Every Morning., Disp: 90 tablet, Rfl: 3  •  lamoTRIgine (LaMICtal) 200 MG tablet, Take 200 mg by mouth 2 (Two) Times a Day., Disp: , Rfl:   •  sildenafil (REVATIO) 20 MG tablet, Take 5 tablets by mouth Daily As Needed (erectile dysfunction)., Disp: 120 tablet, Rfl: 3  •  aspirin 81 MG EC tablet, Take 1 tablet by mouth Daily., Disp: 30 tablet, Rfl: 3  •  rosuvastatin (CRESTOR) 10 MG tablet, Take 1 tablet by mouth Daily., Disp: 90 tablet, Rfl: 1          ROS  ROS      SOCIAL HX  Social History     Socioeconomic History   • Marital status:    Tobacco Use   • Smoking status: Never   • Smokeless tobacco: Never   Vaping Use   • Vaping Use: Never used   Substance and Sexual Activity   • Alcohol use: Not Currently     Alcohol/week: 30.0 standard drinks     Types: 30 Drinks containing 0.5 oz of alcohol per week   • Drug use: Never   • Sexual activity: Yes     Partners: Female     Comment: Single        FAMILY HX  Family History   Problem Relation Age of Onset   • No Known Problems Mother    • Heart disease Father    • Kidney disease Father    • Heart attack Father    • Thyroid disease Sister    • No Known Problems Brother    • No Known Problems Maternal Grandmother    • No Known Problems Maternal Grandfather    • No Known Problems Paternal Grandmother    • No Known Problems Paternal Grandfather              Bernardo Rice III, MD, [facility]

## 2023-02-16 DIAGNOSIS — I10 ESSENTIAL HYPERTENSION: ICD-10-CM

## 2023-02-16 RX ORDER — CARVEDILOL 25 MG/1
TABLET ORAL
Qty: 90 TABLET | Refills: 0 | Status: SHIPPED | OUTPATIENT
Start: 2023-02-16 | End: 2023-03-29

## 2023-03-21 ENCOUNTER — OFFICE VISIT (OUTPATIENT)
Dept: FAMILY MEDICINE CLINIC | Facility: CLINIC | Age: 63
End: 2023-03-21
Payer: COMMERCIAL

## 2023-03-21 VITALS
HEART RATE: 75 BPM | WEIGHT: 235.2 LBS | DIASTOLIC BLOOD PRESSURE: 78 MMHG | TEMPERATURE: 97.7 F | HEIGHT: 76 IN | SYSTOLIC BLOOD PRESSURE: 126 MMHG | OXYGEN SATURATION: 96 % | BODY MASS INDEX: 28.64 KG/M2

## 2023-03-21 DIAGNOSIS — I10 PRIMARY HYPERTENSION: Chronic | ICD-10-CM

## 2023-03-21 DIAGNOSIS — R21 RASH AND NONSPECIFIC SKIN ERUPTION: Primary | ICD-10-CM

## 2023-03-21 PROCEDURE — 99213 OFFICE O/P EST LOW 20 MIN: CPT | Performed by: NURSE PRACTITIONER

## 2023-03-21 NOTE — PROGRESS NOTES
Follow Up Office Note     Patient Name: Randy Asif  : 1960   MRN: [id number]     Chief Complaint:    Chief Complaint   Patient presents with   • Rash     Per patient he started new cholesterol med and gave him a rash; stopped med and rash is gone.    • Hypertension       History of Present Illness: Randy Asif is a 63 y.o. male who presents today with c/o allergic reaction after starting Crestor. He states that shortly after starting the medication he developed a red, itchy rash on his arms and torso. He states that the rash went away after he stopped the medication.    Patient presents for re-evaluation/management chronic HTN. Patient's blood pressure has been stable and controlled on current medications.      Answers for HPI/ROS submitted by the patient on 3/17/2023  What is the primary reason for your visit?: High Blood Pressure  Chronicity: chronic  Onset: more than 1 year ago  Progression since onset: gradually improving  Condition status: controlled  anxiety: No  blurred vision: No  malaise/fatigue: No  orthopnea: No  peripheral edema: No  PND: No  sweats: No  Agents associated with hypertension: no associated agents  CAD risks: dyslipidemia, family history  Compliance problems: no compliance problems      Subjective      I have reviewed and the following portions of the patient's history were updated as appropriate: past family history, past medical history, past social history, past surgical history and problem list.    Review of Systems:   Review of Systems   Constitutional: Negative.    HENT: Negative for facial swelling, sore throat and trouble swallowing.    Respiratory: Negative for cough, chest tightness, shortness of breath, wheezing and stridor.    Cardiovascular: Negative for chest pain and palpitations.   Musculoskeletal: Negative for neck pain.   Skin: Positive for rash.   Neurological: Negative for headaches.        Past Medical History:   Past Medical History:  "  Diagnosis Date   • Abnormal finding on MRI of brain    • Asthmatic bronchitis    • Brain cancer (HCC)    • Elevated AST (SGOT)    • Elevated glucose    • Hyperlipidemia 2/5/2022   • Hypertension 2/5/2018   • Seizures (HCC)    • Shingles          Medications:     Current Outpatient Medications:   •  carvedilol (COREG) 25 MG tablet, TAKE ONE TABLET BY MOUTH TWICE A DAY WITH MEALS, Disp: 90 tablet, Rfl: 0  •  clobetasol (TEMOVATE) 0.05 % cream, Apply to affected areas in thin layer BID, Disp: 30 g, Rfl: 0  •  indapamide (LOZOL) 2.5 MG tablet, Take 1 tablet by mouth Every Morning., Disp: 90 tablet, Rfl: 3  •  lamoTRIgine (LaMICtal) 200 MG tablet, Take 1 tablet by mouth 2 (Two) Times a Day., Disp: , Rfl:   •  sildenafil (REVATIO) 20 MG tablet, Take 5 tablets by mouth Daily As Needed (erectile dysfunction)., Disp: 120 tablet, Rfl: 3    Allergies:   Allergies   Allergen Reactions   • Indapamide Myalgia     Muscle cramps     • Lisinopril Cough   • Oxycodone-Acetaminophen Anxiety   • Percocet [Oxycodone-Acetaminophen] Anxiety   • Rosuvastatin Rash         Objective     Physical Exam:  Vital Signs:   Vitals:    03/21/23 0826   BP: 126/78   Pulse: 75   Temp: 97.7 °F (36.5 °C)   TempSrc: Infrared   SpO2: 96%   Weight: 107 kg (235 lb 3.2 oz)   Height: 193 cm (75.98\")   PainSc: 0-No pain     Body mass index is 28.64 kg/m².     Physical Exam  Vitals and nursing note reviewed.   Constitutional:       General: He is not in acute distress.     Appearance: Normal appearance. He is well-developed. He is not ill-appearing, toxic-appearing or diaphoretic.   HENT:      Head: Normocephalic and atraumatic.      Nose: Nose normal.      Mouth/Throat:      Mouth: Mucous membranes are moist.      Pharynx: No posterior oropharyngeal erythema.   Cardiovascular:      Rate and Rhythm: Normal rate and regular rhythm.   Pulmonary:      Effort: Pulmonary effort is normal. No respiratory distress.      Breath sounds: Normal breath sounds. No stridor. " No wheezing.   Musculoskeletal:      Cervical back: Normal range of motion and neck supple.   Lymphadenopathy:      Cervical: No cervical adenopathy.   Skin:     General: Skin is warm and dry.      Findings: Rash (Erythematous macular rash on bilateral arms and torso) present.   Neurological:      General: No focal deficit present.      Mental Status: He is alert and oriented to person, place, and time.   Psychiatric:         Mood and Affect: Mood normal.         Behavior: Behavior normal. Behavior is cooperative.         Thought Content: Thought content normal.         Judgment: Judgment normal.         Assessment / Plan      Assessment/Plan:   Diagnoses and all orders for this visit:    1. Rash and nonspecific skin eruption (Primary)  Assessment & Plan:  Patient initially stated that his rash went away after he stopped taking the Crestor for a few days.  However, while patient was sitting in the room he developed a rash on both arms and torso.  After discussing possible etiology with patient he states that he did change detergents around the same time that he started taking the Crestor and believes this may be the source of his rash.  The areas of his rash correspond to areas that his clothing has touched.     Patient advised to begin daily OTC antihistamine such as Zyrtec or Xyzal.  Patient may also take Benadryl as directed if symptoms fail to improve.    Recommend discontinuing use of previous detergent and changing to a mild hypoallergenic detergent.    Patient states that he no longer feels that his rash is due to the Crestor but due to his new detergent.  He would like to resume taking Crestor. Plan to allow patient to do trial of medication as I do not feel that patient's rash is a drug reaction but rather contact dermatitis from exposure to chemicals in detergent. Patient to discontinue medication immediately if he develops a rash and to go to ER if any swelling of the face, neck or difficulty  breathing.      2. Primary hypertension  Assessment & Plan:  Hypertension is improving with treatment.  Continue current treatment regimen.  Dietary sodium restriction.  Weight loss.  Regular aerobic exercise.  Continue current medications.  Ambulatory blood pressure monitoring.  Blood pressure will be reassessed at the next regular appointment.           Follow Up:   PRN and at next scheduled appointment(s) with PCP.    Discussed the nature of the medical condition(s) risks, complications, implications, management, safe and proper use of medications. Encouraged medication compliance, and keeping scheduled follow up appointments with me and any other providers.        RTC if symptoms fail to improve, to ER if symptoms worsen.        *Dictated Utilizing Dragon Dictation   Please note that portions of this note were completed with a voice recognition program.   Part of this note may be an electronic transcription/translation of spoken language to printed text using the Dragon Dictation System. Spelling and/or grammatical errors may exist despite efforts at proofreading.        TANYA Blair  [facility] [place]

## 2023-03-24 PROBLEM — L50.9 URTICARIA: Status: ACTIVE | Noted: 2023-03-24

## 2023-03-24 PROBLEM — R21 RASH AND NONSPECIFIC SKIN ERUPTION: Status: ACTIVE | Noted: 2023-03-24

## 2023-03-24 NOTE — ASSESSMENT & PLAN NOTE
Patient initially stated that his rash went away after he stopped taking the Crestor for a few days.  However, while patient was sitting in the room he developed a rash on both arms and torso.  After discussing possible etiology with patient he states that he did change detergents around the same time that he started taking the Crestor and believes this may be the source of his rash.  The areas of his rash correspond to areas that his clothing has touched.     Patient advised to begin daily OTC antihistamine such as Zyrtec or Xyzal.  Patient may also take Benadryl as directed if symptoms fail to improve.    Recommend discontinuing use of previous detergent and changing to a mild hypoallergenic detergent.    Patient states that he no longer feels that his rash is due to the Crestor but due to his new detergent.  He would like to resume taking Crestor. Plan to allow patient to do trial of medication as I do not feel that patient's rash is a drug reaction but rather contact dermatitis from exposure to chemicals in detergent. Patient to discontinue medication immediately if he develops a rash and to go to ER if any swelling of the face, neck or difficulty breathing.

## 2023-03-29 DIAGNOSIS — I10 ESSENTIAL HYPERTENSION: ICD-10-CM

## 2023-03-29 RX ORDER — CARVEDILOL 25 MG/1
TABLET ORAL
Qty: 90 TABLET | Refills: 0 | Status: SHIPPED | OUTPATIENT
Start: 2023-03-29

## 2023-05-10 DIAGNOSIS — I10 ESSENTIAL HYPERTENSION: ICD-10-CM

## 2023-05-10 RX ORDER — CARVEDILOL 25 MG/1
TABLET ORAL
Qty: 90 TABLET | Refills: 0 | Status: SHIPPED | OUTPATIENT
Start: 2023-05-10

## 2023-05-26 ENCOUNTER — OFFICE VISIT (OUTPATIENT)
Dept: FAMILY MEDICINE CLINIC | Facility: CLINIC | Age: 63
End: 2023-05-26

## 2023-05-26 VITALS
SYSTOLIC BLOOD PRESSURE: 123 MMHG | HEART RATE: 76 BPM | DIASTOLIC BLOOD PRESSURE: 74 MMHG | HEIGHT: 76 IN | OXYGEN SATURATION: 96 % | TEMPERATURE: 98 F | BODY MASS INDEX: 27.52 KG/M2 | WEIGHT: 226 LBS

## 2023-05-26 DIAGNOSIS — E78.2 MIXED HYPERLIPIDEMIA: Primary | Chronic | ICD-10-CM

## 2023-05-26 DIAGNOSIS — F51.04 PSYCHOPHYSIOLOGICAL INSOMNIA: ICD-10-CM

## 2023-05-26 DIAGNOSIS — F41.1 GENERALIZED ANXIETY DISORDER: Chronic | ICD-10-CM

## 2023-05-26 PROCEDURE — 99214 OFFICE O/P EST MOD 30 MIN: CPT | Performed by: NURSE PRACTITIONER

## 2023-05-26 RX ORDER — EZETIMIBE 10 MG/1
10 TABLET ORAL DAILY
Qty: 30 TABLET | Refills: 2 | Status: SHIPPED | OUTPATIENT
Start: 2023-05-26

## 2023-05-26 RX ORDER — HYDROXYZINE HYDROCHLORIDE 25 MG/1
TABLET, FILM COATED ORAL
Qty: 30 TABLET | Refills: 2 | Status: SHIPPED | OUTPATIENT
Start: 2023-05-26 | End: 2023-05-31 | Stop reason: SDUPTHER

## 2023-05-29 PROBLEM — F51.04 PSYCHOPHYSIOLOGICAL INSOMNIA: Status: ACTIVE | Noted: 2023-05-29

## 2023-05-29 PROBLEM — N52.9 ERECTILE DYSFUNCTION: Chronic | Status: ACTIVE | Noted: 2020-11-05

## 2023-05-29 PROBLEM — R21 RASH AND NONSPECIFIC SKIN ERUPTION: Status: RESOLVED | Noted: 2023-03-24 | Resolved: 2023-05-29

## 2023-05-29 NOTE — ASSESSMENT & PLAN NOTE
Psychological condition is worsening.  Medication changes per orders.  Psychological condition  will be reassessed 4-6 weeks..

## 2023-05-29 NOTE — ASSESSMENT & PLAN NOTE
Lipid abnormalities are improving with lifestyle modifications. LDL cholesterol remains above goal of less than 100. Patient's last LDL was 154.  Nutritional counseling was provided. and Pharmacotherapy as ordered.  Lipids will be reassessed in 3 months.   Patient is not fasting today.

## 2023-05-29 NOTE — PROGRESS NOTES
Follow Up Office Note     Patient Name: Randy Asif  : 1960   MRN: [id number]     Chief Complaint:    Chief Complaint   Patient presents with   • Hyperlipidemia     Per patient he would like try a new medication for his cholesterol.    • Anxiety     Per patient he would like medication for his anxiety.        History of Present Illness: Randy Asif is a 63 y.o. male who presents today for re-evaluation/management chronic HLD. Patient's triglycerides have been improving with lifestyle changes, however his LDL cholesterol remains above goal. Patient does not want to take statins. He states that he heard about Zetia and would like to try this medication.    Patient also c/o worsening of chronic anxiety recently. He would like to discuss medication to help his symptoms.     Patient c/o difficulty sleeping. He states that he can fall asleep fine, but wakes up several times during the night. He denies snoring or waking up short of breath. He states that he wakes up and then begins having anxious thoughts and cannot relax to go back to sleep. He denies nightmares.    Subjective      I have reviewed and the following portions of the patient's history were updated as appropriate: past family history, past medical history, past social history, past surgical history and problem list.    Review of Systems:   Review of Systems   Constitutional: Negative.    Respiratory: Negative.    Cardiovascular: Negative.    Psychiatric/Behavioral: The patient is nervous/anxious.         Past Medical History:   Past Medical History:   Diagnosis Date   • Abnormal finding on MRI of brain    • Asthmatic bronchitis    • Elevated glucose    • Shingles          Medications:     Current Outpatient Medications:   •  carvedilol (COREG) 25 MG tablet, TAKE ONE TABLET BY MOUTH TWICE A DAY WITH MEALS, Disp: 90 tablet, Rfl: 0  •  clobetasol (TEMOVATE) 0.05 % cream, Apply to affected areas in thin layer BID, Disp: 30 g, Rfl: 0  •   "indapamide (LOZOL) 2.5 MG tablet, Take 1 tablet by mouth Every Morning., Disp: 90 tablet, Rfl: 3  •  lamoTRIgine (LaMICtal) 200 MG tablet, Take 1 tablet by mouth 2 (Two) Times a Day., Disp: , Rfl:   •  sildenafil (REVATIO) 20 MG tablet, Take 5 tablets by mouth Daily As Needed (erectile dysfunction)., Disp: 120 tablet, Rfl: 3  •  ezetimibe (Zetia) 10 MG tablet, Take 1 tablet by mouth Daily., Disp: 30 tablet, Rfl: 2  •  hydrOXYzine (ATARAX) 25 MG tablet, 1/2-1 tablet PO Qhs prn anxiety/insomnia, Disp: 30 tablet, Rfl: 2    Allergies:   Allergies   Allergen Reactions   • Indapamide Myalgia     Muscle cramps     • Lisinopril Cough   • Oxycodone-Acetaminophen Anxiety   • Percocet [Oxycodone-Acetaminophen] Anxiety   • Rosuvastatin Rash         Objective     Physical Exam:  Vital Signs:   Vitals:    05/26/23 1144   BP: 123/74   Pulse: 76   Temp: 98 °F (36.7 °C)   TempSrc: Infrared   SpO2: 96%   Weight: 103 kg (226 lb)   Height: 193 cm (75.98\")   PainSc: 0-No pain     Body mass index is 27.52 kg/m².     Physical Exam  Vitals and nursing note reviewed.   Constitutional:       General: He is not in acute distress.     Appearance: Normal appearance. He is well-developed. He is not ill-appearing, toxic-appearing or diaphoretic.   HENT:      Head: Normocephalic and atraumatic.   Cardiovascular:      Rate and Rhythm: Normal rate and regular rhythm.   Pulmonary:      Effort: Pulmonary effort is normal. No respiratory distress.      Breath sounds: Normal breath sounds. No stridor. No wheezing.   Skin:     General: Skin is warm and dry.   Neurological:      General: No focal deficit present.      Mental Status: He is alert and oriented to person, place, and time. Mental status is at baseline.   Psychiatric:         Mood and Affect: Mood normal.         Behavior: Behavior normal. Behavior is cooperative.         Thought Content: Thought content normal.         Judgment: Judgment normal.         Assessment / Plan      Assessment/Plan: "   Diagnoses and all orders for this visit:    1. Mixed hyperlipidemia (Primary)  Assessment & Plan:  Lipid abnormalities are improving with lifestyle modifications. LDL cholesterol remains above goal of less than 100. Patient's last LDL was 154.  Nutritional counseling was provided. and Pharmacotherapy as ordered.  Lipids will be reassessed in 3 months.   Patient is not fasting today.    Orders:  -     ezetimibe (Zetia) 10 MG tablet; Take 1 tablet by mouth Daily.  Dispense: 30 tablet; Refill: 2    2. Generalized anxiety disorder  Assessment & Plan:  Psychological condition is worsening.  Medication changes per orders.  Psychological condition  will be reassessed 4-6 weeks..    Orders:  -     hydrOXYzine (ATARAX) 25 MG tablet; 1/2-1 tablet PO Qhs prn anxiety/insomnia  Dispense: 30 tablet; Refill: 2    3. Psychophysiological insomnia  Assessment & Plan:  Newly identified problem.   Plan to start hydroxyzine at bedtime.      Orders:  -     hydrOXYzine (ATARAX) 25 MG tablet; 1/2-1 tablet PO Qhs prn anxiety/insomnia  Dispense: 30 tablet; Refill: 2         Follow Up:   PRN and at next scheduled appointment(s) with PCP.    Discussed the nature of the medical condition(s) risks, complications, implications, management, safe and proper use of medications. Encouraged medication compliance, and keeping scheduled follow up appointments with me and any other providers.      RTC if symptoms fail to improve, to ER if symptoms worsen.        *Dictated Utilizing Dragon Dictation   Please note that portions of this note were completed with a voice recognition program.   Part of this note may be an electronic transcription/translation of spoken language to printed text using the Dragon Dictation System. Spelling and/or grammatical errors may exist despite efforts at proofreading.        TANYA Blair  [facility]

## 2023-05-31 DIAGNOSIS — F41.1 GENERALIZED ANXIETY DISORDER: Chronic | ICD-10-CM

## 2023-05-31 DIAGNOSIS — F51.04 PSYCHOPHYSIOLOGICAL INSOMNIA: ICD-10-CM

## 2023-05-31 RX ORDER — HYDROXYZINE HYDROCHLORIDE 25 MG/1
TABLET, FILM COATED ORAL
Qty: 30 TABLET | Refills: 2 | Status: SHIPPED | OUTPATIENT
Start: 2023-05-31 | End: 2023-06-05 | Stop reason: SDUPTHER

## 2023-05-31 NOTE — TELEPHONE ENCOUNTER
Provider:RENATA ARIAS  Caller: CRUZ DUNBAR  Relationship to Patient: SELF   Pharmacy: [facility]  Phone Number: [phone]  Reason for Call:PATIENT WOULD LIKE AN INCREASE IN HIS DOSAGE OF HYDROXYZINE, FROM 1/2 A DAY TO 2 A DAY, ONE IN THE MORNING AND ONE IN THE AFTERNOON.

## 2023-06-05 ENCOUNTER — TELEMEDICINE (OUTPATIENT)
Dept: PSYCHIATRY | Facility: CLINIC | Age: 63
End: 2023-06-05
Payer: COMMERCIAL

## 2023-06-05 DIAGNOSIS — F41.1 GENERALIZED ANXIETY DISORDER: Primary | Chronic | ICD-10-CM

## 2023-06-05 DIAGNOSIS — F10.21 ALCOHOL DEPENDENCE IN REMISSION: ICD-10-CM

## 2023-06-05 DIAGNOSIS — F51.04 PSYCHOPHYSIOLOGICAL INSOMNIA: Chronic | ICD-10-CM

## 2023-06-05 PROCEDURE — 99214 OFFICE O/P EST MOD 30 MIN: CPT

## 2023-06-05 RX ORDER — ACAMPROSATE CALCIUM 333 MG/1
666 TABLET, DELAYED RELEASE ORAL 3 TIMES DAILY
Qty: 180 TABLET | Refills: 0 | Status: SHIPPED | OUTPATIENT
Start: 2023-06-05

## 2023-06-05 RX ORDER — HYDROXYZINE HYDROCHLORIDE 25 MG/1
25 TABLET, FILM COATED ORAL 3 TIMES DAILY PRN
Qty: 90 TABLET | Refills: 0 | Status: SHIPPED | OUTPATIENT
Start: 2023-06-05

## 2023-06-05 NOTE — PROGRESS NOTES
This provider is located at [place], [place]. The Patient is seen remotely using Video. Patient is being seen via telehealth and confirm that they are in a secure environment for this session. Patient is located in [place], [place] in his car. The patient's condition being diagnosed/treated is appropriate for telemedicine. Provider identified as Manuel Perez as well as credentials APRN MSN PMHNP-BC.   The client/patient gave consent to be seen remotely, and when consent is given they understand that the consent allows for patient identifiable information to be sent to a third party as needed.  They may refuse to be seen remotely at any time. The electronic data is encrypted and password protected, and the patient has been advised of the potential risks to privacy not withstanding such measures.    Chief Complaint  Anxiety and Alcohol Problem    Subjective        Randy Asif presents to [facility] for   History of Present Illness  Patient is seen today for follow-up visit for anxiety and alcohol dependence in remission.  Patient had not been seen in almost 1 year.  Patient states he has remained sober from alcohol.  At last visit, patient had just completed rehab.  States he has not had any alcohol since that time.  He states he has had some worsening anxiety.  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  States he has developed a lot of worry and overthinking.  States he is woke up in the middle of the night sweating with racing heart.  He states his primary care physician started him on hydroxyzine.  Patient states this medication has worked well for his nighttime anxiety.  He states he is sleeping well.  States his appetite is good.  He denies any depression.  Denies any hopelessness.  States he is keeping himself busy.  States he is taking pottery classes.  He is also taking stained glass classes.  States he is getting ready to take up golf.   States he also exercises every morning.  He denies any manic type symptoms.  Denies any paranoia.  Denies any auditory or visual hallucinations.  Denies any suicidal or homicidal ideation.  Patient is requesting to be restarted on Campral.  He states due to the stress his family is worried that he will relapse.  Patient states he wants to be proactive and do everything he can to prevent that from happening.  Objective   Vital Signs:   There were no vitals taken for this visit.      Mental Status Exam:   Hygiene:   good  Cooperation:  Cooperative  Eye Contact:  Good  Psychomotor Behavior:  Appropriate  Affect:  Full range  Mood: anxious  Speech:  Normal  Thought Process:  Goal directed  Thought Content:  Normal  Suicidal:  None  Homicidal:  None  Hallucinations:  None  Delusion:  None  Memory:  Intact  Orientation:  Person, Place, Time, and Situation  Reliability:  good  Insight:  Good  Judgement:  Good  Impulse Control:  Good  Physical/Medical Issues:  No      PHQ-2 Depression Screening  Little interest or pleasure in doing things? 0-->not at all   Feeling down, depressed, or hopeless? 0-->not at all   PHQ-2 Total Score 0         PILY 7 anxiety screening tool that patient filled out virtually reviewed by this APRN at today's encounter.    PROMIS scale screening tool that patient filled out virtually reviewed by this APRN at today's encounter.    [facility] request number [id number] reviewed by this APRN at today's encounter.    Previous Provider notes and available records reviewed by this APRN today.   Current Medications:   Current Outpatient Medications   Medication Sig Dispense Refill    acamprosate (CAMPRAL) 333 MG EC tablet Take 2 tablets by mouth 3 (Three) Times a Day. 180 tablet 0    carvedilol (COREG) 25 MG tablet TAKE ONE TABLET BY MOUTH TWICE A DAY WITH MEALS 90 tablet 0    clobetasol (TEMOVATE) 0.05 % cream Apply to affected areas in thin layer BID 30 g 0    ezetimibe (Zetia) 10 MG tablet Take 1 tablet by  mouth Daily. 30 tablet 2    hydrOXYzine (ATARAX) 25 MG tablet Take 1 tablet by mouth 3 (Three) Times a Day As Needed for Anxiety. 90 tablet 0    indapamide (LOZOL) 2.5 MG tablet Take 1 tablet by mouth Every Morning. 90 tablet 3    lamoTRIgine (LaMICtal) 200 MG tablet Take 1 tablet by mouth 2 (Two) Times a Day.      sildenafil (REVATIO) 20 MG tablet Take 5 tablets by mouth Daily As Needed (erectile dysfunction). 120 tablet 3     No current facility-administered medications for this visit.       Physical Exam   Result Review :    The following data was reviewed by: TANYA Garzon on 06/05/2023:  Common labs          6/20/2022    10:54   Common Labs   Glucose 103    BUN 13    Creatinine 0.83    Sodium 136    Potassium 3.4    Chloride 96    Calcium 9.9    Albumin 4.70    Total Bilirubin 0.4    Alkaline Phosphatase 59    AST (SGOT) 27    ALT (SGPT) 53    Total Cholesterol 217    Triglycerides 112    HDL Cholesterol 43    LDL Cholesterol  154             Assessment and Plan   Problem List Items Addressed This Visit          Mental Health    Generalized anxiety disorder - Primary (Chronic)    Relevant Medications    acamprosate (CAMPRAL) 333 MG EC tablet    hydrOXYzine (ATARAX) 25 MG tablet       Sleep    Psychophysiological insomnia    Relevant Medications    acamprosate (CAMPRAL) 333 MG EC tablet    hydrOXYzine (ATARAX) 25 MG tablet     Other Visit Diagnoses       Alcohol dependence in remission        Relevant Medications    acamprosate (CAMPRAL) 333 MG EC tablet          Discussed treatment options with patient.  Discussed with patient that we can restart the Campral 333 mg take 2 tablets 3 times daily for alcohol dependence.  Also discussed options for treating his anxiety.  Discussed with patient that SSRIs are first line treatment for generalized anxiety.  Patient states he has been on Prozac before and felt that it worked.  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  He states he would feel more comfortable just remaining on the hydroxyzine at this time discussed with patient that we can increase the hydroxyzine to 25 mg take half to 1 tablet 3 times daily as needed for anxiety/insomnia.  Patient would like to do so.  We will see patient again in 4 weeks to reassess.  Encouraged patient to contact the office if he has any issues sooner.    TREATMENT PLAN/GOALS: Continue supportive psychotherapy efforts and medications as indicated. Treatment and medication options discussed during today's visit. Patient ackowledged and verbally consented to continue with current treatment plan and was educated on the importance of compliance with treatment and follow-up appointments.    DEPRESSION:  Patient screened positive for depression based on a PHQ-9 score of 0 on 6/5/2023. Follow-up recommendations include: Suicide Risk Assessment performed.       MEDICATION ISSUES:  We discussed risks, benefits, and side effects of the above medications and the patient was agreeable with the plan. Patient was educated on the importance of compliance with treatment and follow-up appointments.  Patient is agreeable to call the office with any worsening of symptoms or onset of side effects. Patient is agreeable to call 911 or go to the nearest ER should he/she begin having SI/HI.      Counseled patient regarding multimodal approach with healthy nutrition, healthy sleep, regular physical activity, social activities, counseling, and medications.      Coping skills reviewed and encouraged positive framing of thoughts     Assisted patient in processing above session content; acknowledged and normalized patient’s thoughts, feelings, and concerns.  Applied  positive coping skills and behavior management in session.  Allowed patient to freely discuss issues without interruption or judgment. Provided safe, confidential environment to facilitate the development of positive therapeutic relationship and  encourage open, honest communication. Assisted patient in identifying risk factors which would indicate the need for higher level of care including thoughts to harm self or others and/or self-harming behavior and encouraged patient to contact this office, call 911, or present to the nearest emergency room should any of these events occur. Discussed crisis intervention services and means to access. If patient has any concerns or needs assistance they were instructed to call the [facility] at [phone].    MEDS ORDERED DURING VISIT:  New Medications Ordered This Visit   Medications    acamprosate (CAMPRAL) 333 MG EC tablet     Sig: Take 2 tablets by mouth 3 (Three) Times a Day.     Dispense:  180 tablet     Refill:  0    hydrOXYzine (ATARAX) 25 MG tablet     Sig: Take 1 tablet by mouth 3 (Three) Times a Day As Needed for Anxiety.     Dispense:  90 tablet     Refill:  0         Follow Up   Return in about 4 weeks (around 7/3/2023) for Video visit.    Patient was given instructions and counseling regarding his condition or for health maintenance advice. Please see specific information pulled into the AVS if appropriate.         This document has been electronically signed by TANYA Garzon  June 5, 2023 13:17 EDT    Part of this note may be an electronic transcription/translation of spoken language to printed text using the Dragon Dictation System.

## 2023-07-05 PROBLEM — F10.21 ALCOHOL DEPENDENCE IN REMISSION: Status: ACTIVE | Noted: 2023-07-05

## 2023-07-27 ENCOUNTER — OFFICE VISIT (OUTPATIENT)
Dept: FAMILY MEDICINE CLINIC | Facility: CLINIC | Age: 63
End: 2023-07-27
Payer: COMMERCIAL

## 2023-07-27 ENCOUNTER — LAB (OUTPATIENT)
Dept: LAB | Facility: HOSPITAL | Age: 63
End: 2023-07-27
Payer: COMMERCIAL

## 2023-07-27 VITALS
HEIGHT: 76 IN | BODY MASS INDEX: 27.89 KG/M2 | OXYGEN SATURATION: 96 % | TEMPERATURE: 97.8 F | DIASTOLIC BLOOD PRESSURE: 82 MMHG | SYSTOLIC BLOOD PRESSURE: 127 MMHG | WEIGHT: 229 LBS | HEART RATE: 74 BPM

## 2023-07-27 DIAGNOSIS — G89.29 CHRONIC PAIN OF RIGHT KNEE: ICD-10-CM

## 2023-07-27 DIAGNOSIS — E78.2 MIXED HYPERLIPIDEMIA: ICD-10-CM

## 2023-07-27 DIAGNOSIS — M25.561 CHRONIC PAIN OF RIGHT KNEE: ICD-10-CM

## 2023-07-27 DIAGNOSIS — Z79.899 ENCOUNTER FOR LONG-TERM CURRENT USE OF MEDICATION: ICD-10-CM

## 2023-07-27 DIAGNOSIS — I10 PRIMARY HYPERTENSION: Chronic | ICD-10-CM

## 2023-07-27 DIAGNOSIS — E78.2 MIXED HYPERLIPIDEMIA: Primary | ICD-10-CM

## 2023-07-27 LAB
ALBUMIN SERPL-MCNC: 4.5 G/DL (ref 3.5–5.2)
ALBUMIN/GLOB SERPL: 1.9 G/DL
ALP SERPL-CCNC: 68 U/L (ref 39–117)
ALT SERPL W P-5'-P-CCNC: 44 U/L (ref 1–41)
ANION GAP SERPL CALCULATED.3IONS-SCNC: 11.9 MMOL/L (ref 5–15)
AST SERPL-CCNC: 32 U/L (ref 1–40)
BILIRUB SERPL-MCNC: 0.6 MG/DL (ref 0–1.2)
BUN SERPL-MCNC: 15 MG/DL (ref 8–23)
BUN/CREAT SERPL: 16 (ref 7–25)
CALCIUM SPEC-SCNC: 9.9 MG/DL (ref 8.6–10.5)
CHLORIDE SERPL-SCNC: 97 MMOL/L (ref 98–107)
CHOLEST SERPL-MCNC: 216 MG/DL (ref 0–200)
CO2 SERPL-SCNC: 30.1 MMOL/L (ref 22–29)
CREAT SERPL-MCNC: 0.94 MG/DL (ref 0.76–1.27)
EGFRCR SERPLBLD CKD-EPI 2021: 91.7 ML/MIN/1.73
GLOBULIN UR ELPH-MCNC: 2.4 GM/DL
GLUCOSE SERPL-MCNC: 116 MG/DL (ref 65–99)
HDLC SERPL-MCNC: 36 MG/DL (ref 40–60)
LDLC SERPL CALC-MCNC: 155 MG/DL (ref 0–100)
LDLC/HDLC SERPL: 4.24 {RATIO}
POTASSIUM SERPL-SCNC: 3.4 MMOL/L (ref 3.5–5.2)
PROT SERPL-MCNC: 6.9 G/DL (ref 6–8.5)
SODIUM SERPL-SCNC: 139 MMOL/L (ref 136–145)
TRIGL SERPL-MCNC: 137 MG/DL (ref 0–150)
VLDLC SERPL-MCNC: 25 MG/DL (ref 5–40)

## 2023-07-27 PROCEDURE — 99214 OFFICE O/P EST MOD 30 MIN: CPT | Performed by: NURSE PRACTITIONER

## 2023-07-27 PROCEDURE — 80061 LIPID PANEL: CPT

## 2023-07-27 PROCEDURE — 80053 COMPREHEN METABOLIC PANEL: CPT

## 2023-07-27 RX ORDER — METOPROLOL SUCCINATE 100 MG/1
TABLET, EXTENDED RELEASE ORAL
COMMUNITY

## 2023-07-27 RX ORDER — LOSARTAN POTASSIUM 100 MG/1
TABLET ORAL
COMMUNITY
End: 2023-07-27 | Stop reason: ALTCHOICE

## 2023-07-30 PROBLEM — M25.561 CHRONIC PAIN OF RIGHT KNEE: Chronic | Status: ACTIVE | Noted: 2023-07-30

## 2023-07-30 PROBLEM — G89.29 CHRONIC PAIN OF RIGHT KNEE: Chronic | Status: ACTIVE | Noted: 2023-07-30

## 2023-07-30 PROBLEM — G89.29 CHRONIC PAIN OF RIGHT KNEE: Status: ACTIVE | Noted: 2023-07-30

## 2023-07-30 PROBLEM — M25.561 CHRONIC PAIN OF RIGHT KNEE: Status: ACTIVE | Noted: 2023-07-30

## 2023-07-30 NOTE — ASSESSMENT & PLAN NOTE
Acute exacerbation of chronic knee pain.  Plan to refer to physical therapy per patient request. May take OTC Tylenol Arthritis for pain.

## 2023-07-30 NOTE — PROGRESS NOTES
Follow Up Office Note     Patient Name: Randy Asif  : 1960   MRN: [id number]     Chief Complaint:    Chief Complaint   Patient presents with    Knee Pain     Per patient he would like a referral to PT for his knee.     Hyperlipidemia    Hypertension       History of Present Illness: Randy Asif is a 63 y.o. male who presents today for re-evaluation/management chronic HLD. Patient's LDL cholesterol above goal at 154 with last fasting lipid panel. Patient states that he is retired now and is trying to eat healthier and be more active. He would like to have his cholesterol level checked  today. Patient is trying to avoid statin therapy as he had a rash after taking rosuvastatin in the past.    Patient presents for reevaluation and management of chronic hypertension.  His blood pressure has been stable and controlled on current medications.    Patient c/o acute exacerbation chronic right knee pain. He is requesting PT referral. Patient states that he feels that his patella isn't tracking correctly. He denies recent injury or trauma.      Subjective      I have reviewed and the following portions of the patient's history were updated as appropriate: past family history, past medical history, past social history, past surgical history and problem list.    Review of Systems:   Review of Systems   Constitutional: Negative.    Respiratory: Negative.  Negative for shortness of breath.    Cardiovascular: Negative.  Negative for chest pain and palpitations.   Musculoskeletal:  Positive for arthralgias (right knee). Negative for neck pain.   Neurological:  Negative for headaches.      Past Medical History:   Past Medical History:   Diagnosis Date    Abnormal finding on MRI of brain     Asthmatic bronchitis     Elevated glucose     Shingles          Medications:     Current Outpatient Medications:     carvedilol (COREG) 25 MG tablet, TAKE 1 TABLET BY MOUTH TWICE A DAY WITH MEALS, Disp: 90 tablet, Rfl:  "0    clobetasol (TEMOVATE) 0.05 % cream, Apply to affected areas in thin layer BID, Disp: 30 g, Rfl: 0    ezetimibe (Zetia) 10 MG tablet, Take 1 tablet by mouth Daily., Disp: 30 tablet, Rfl: 2    hydrOXYzine (ATARAX) 25 MG tablet, Take 1 tablet by mouth 3 (Three) Times a Day As Needed for Anxiety., Disp: 90 tablet, Rfl: 0    indapamide (LOZOL) 2.5 MG tablet, Take 1 tablet by mouth Every Morning., Disp: 90 tablet, Rfl: 3    lamoTRIgine (LaMICtal) 200 MG tablet, Take 1 tablet by mouth 2 (Two) Times a Day., Disp: , Rfl:     sildenafil (REVATIO) 20 MG tablet, Take 5 tablets by mouth Daily As Needed (erectile dysfunction)., Disp: 120 tablet, Rfl: 3    metoprolol succinate XL (TOPROL-XL) 100 MG 24 hr tablet, Two times a day, Disp: , Rfl:     Allergies:   Allergies   Allergen Reactions    Indapamide Myalgia     Muscle cramps      Lisinopril Cough    Oxycodone-Acetaminophen Anxiety    Percocet [Oxycodone-Acetaminophen] Anxiety    Rosuvastatin Rash         Objective     Physical Exam:  Vital Signs:   Vitals:    07/27/23 0831   BP: 127/82   Pulse: 74   Temp: 97.8 °F (36.6 °C)   TempSrc: Infrared   SpO2: 96%   Weight: 104 kg (229 lb)   Height: 193 cm (75.98\")   PainSc: 0-No pain     Body mass index is 27.89 kg/m².     Physical Exam  Vitals and nursing note reviewed.   Constitutional:       General: He is not in acute distress.     Appearance: Normal appearance. He is well-developed. He is not ill-appearing, toxic-appearing or diaphoretic.   HENT:      Head: Normocephalic and atraumatic.   Cardiovascular:      Rate and Rhythm: Normal rate and regular rhythm.   Pulmonary:      Effort: Pulmonary effort is normal. No respiratory distress.      Breath sounds: Normal breath sounds. No stridor. No wheezing.   Musculoskeletal:      Right knee: Swelling present. No deformity or effusion. Decreased range of motion. Abnormal patellar mobility.      Left knee: Normal.   Skin:     General: Skin is warm and dry.   Neurological:      " General: No focal deficit present.      Mental Status: He is alert and oriented to person, place, and time.   Psychiatric:         Mood and Affect: Mood normal.         Behavior: Behavior normal. Behavior is cooperative.         Thought Content: Thought content normal.         Judgment: Judgment normal.       Assessment / Plan      Assessment/Plan:   Diagnoses and all orders for this visit:    1. Mixed hyperlipidemia (Primary)  Assessment & Plan:  Lipid abnormalities are stable but above goal.  Plan to check fasting lipid panel today, further recommendation after results evaluation.  Continue Zetia.    Orders:  -     Lipid Panel; Future    2. Primary hypertension  Assessment & Plan:  Hypertension is improving with treatment.  Continue current treatment regimen.  Dietary sodium restriction.  Weight loss.  Regular aerobic exercise.  Continue current medications.  Ambulatory blood pressure monitoring.  Blood pressure will be reassessed at the next regular appointment.      3. Chronic pain of right knee  Assessment & Plan:  Acute exacerbation of chronic knee pain.  Plan to refer to physical therapy per patient request. May take OTC Tylenol Arthritis for pain.    Orders:  -     Ambulatory Referral to Physical Therapy Evaluate and treat    4. Encounter for long-term current use of medication  -     Comprehensive Metabolic Panel; Future           Follow Up:   PRN and at next scheduled appointment(s) with PCP.    Discussed the nature of the medical condition(s) risks, complications, implications, management, safe and proper use of medications. Encouraged medication compliance, and keeping scheduled follow up appointments with me and any other providers.      RTC if symptoms fail to improve, to ER if symptoms worsen.        *Dictated Utilizing Dragon Dictation   Please note that portions of this note were completed with a voice recognition program.   Part of this note may be an electronic transcription/translation of spoken  language to printed text using the Dragon Dictation System. Spelling and/or grammatical errors may exist despite efforts at proofreading.        TANYA Blair  [facility]

## 2023-07-30 NOTE — ASSESSMENT & PLAN NOTE
Lipid abnormalities are stable but above goal.  Plan to check fasting lipid panel today, further recommendation after results evaluation.  Continue Zetia.

## 2023-08-02 ENCOUNTER — TELEMEDICINE (OUTPATIENT)
Dept: PSYCHIATRY | Facility: CLINIC | Age: 63
End: 2023-08-02
Payer: COMMERCIAL

## 2023-08-02 DIAGNOSIS — F10.21 ALCOHOL DEPENDENCE IN REMISSION: Primary | ICD-10-CM

## 2023-08-02 DIAGNOSIS — F41.1 GENERALIZED ANXIETY DISORDER: ICD-10-CM

## 2023-08-07 DIAGNOSIS — I10 ESSENTIAL HYPERTENSION: ICD-10-CM

## 2023-08-07 NOTE — TELEPHONE ENCOUNTER
Caller: Randy Asif Dale    Relationship: Self    Best call back number:      Requested Prescriptions:     PREVIOUS MEDICATION THAT WAS A FORD FOR HIS BLOOD PRESSURE    Pharmacy where request should be sent: [facility] [id number] - [place] [id number] [facility]  [address] [facility] [address] - [phone] PH - [phone] FX     Last office visit with prescribing clinician: 7/27/2023   Last telemedicine visit with prescribing clinician: Visit date not found   Next office visit with prescribing clinician: Visit date not found     Additional details provided by patient:  PATIENT IS OUT OF MEDICATION AND HE ADVISED THAT HE IS NOT ALLERGIC TO MEDICATION    Does the patient have less than a 3 day supply:  [x] Yes  [] No    Benjy Guerrero Rep   08/07/23 10:41 EDT

## 2023-08-09 ENCOUNTER — TREATMENT (OUTPATIENT)
Dept: PHYSICAL THERAPY | Facility: CLINIC | Age: 63
End: 2023-08-09
Payer: COMMERCIAL

## 2023-08-09 DIAGNOSIS — M76.51 PATELLAR TENDINITIS OF RIGHT KNEE: ICD-10-CM

## 2023-08-09 DIAGNOSIS — M25.561 CHRONIC PAIN OF RIGHT KNEE: Primary | ICD-10-CM

## 2023-08-09 DIAGNOSIS — G89.29 CHRONIC PAIN OF RIGHT KNEE: Primary | ICD-10-CM

## 2023-08-09 PROCEDURE — 97530 THERAPEUTIC ACTIVITIES: CPT | Performed by: PHYSICAL THERAPIST

## 2023-08-09 PROCEDURE — 97161 PT EVAL LOW COMPLEX 20 MIN: CPT | Performed by: PHYSICAL THERAPIST

## 2023-08-09 PROCEDURE — G0283 ELEC STIM OTHER THAN WOUND: HCPCS | Performed by: PHYSICAL THERAPIST

## 2023-08-09 NOTE — PROGRESS NOTES
Physical Therapy Initial Evaluation and Plan of Care  [facility] [place]  [address] [facility], [address] [address]        Patient: Randy Asif   : 1960  Diagnosis/ICD-10 Code:  Chronic pain of right knee [M25.561, G89.29]  Referring practitioner: TANYA Blair  Date of Initial Visit: 2023  Today's Date: 2023  Patient seen for 2 sessions         Visit Diagnoses:    ICD-10-CM ICD-9-CM   1. Chronic pain of right knee  M25.561 719.46    G89.29 338.29   2. Patellar tendinitis of right knee  M76.51 726.64         Subjective Questionnaire: LEFS:     Subjective Evaluation    History of Present Illness  Mechanism of injury: Mid-2022, had to been working out on stair climber LAC. 2-3 days on it and developed medial right knee pain. Had MRI, meniscus pathology found medial compartment.  Did well after last year treatment.    About 8 weeks, coming off a step knee twisted to right and became painful.      Patient Occupation: Retired DC Quality of life: excellent    Pain  Location: Right medial knee pain.  Giving away with sharp pain.  Alleviating factors: Extreme knee flexion.  Aggravating factors: stairs (Going down stairs in reciprical manner, twisting with weight on the knee. Walking up incline.)  Progression: no change           Objective          Tenderness     Right Knee   Tenderness in the inferior patella, medial joint line (Anterior aspect) and patellar tendon (Medial side).     Active Range of Motion   Left Hip   Normal active range of motion    Right Hip   Normal active range of motion  Left Knee   Normal active range of motion    Right Knee   Normal active range of motion    Patellar Mobility   Left Knee Patellar tendons within functional limits include the medial, lateral, superior and inferior.     Right Knee Patellar tendons within functional limits include the medial, lateral, superior and inferior.     Patellar Static Positioning   Left Knee:  WFL  Right Knee: lateral tilt    Strength/Myotome Testing     Left Knee   Normal strength    Right Knee   Normal strength  Right knee extension strength: Pain patellar tendon.  Quadriceps contraction: good    Tests     Right Knee   Negative anterior drawer, anterior Lachman, lateral Rodriguez, medial Rodriguez, posterior drawer, Thessaly's test at 5 degrees, Thessaly's test at 20 degrees, valgus stress test at 0 degrees, valgus stress test at 30 degrees, varus stress test at 0 degrees and varus stress test at 30 degrees.     Additional Tests Details  Eccentric loading reproduces anterior medial right knee pain.    Right Knee Flexibility Comments:   Hamstring mild to moderate tightness  IT band: normal  Quad: extremely tight    Ambulation   Weight-Bearing Status   Weight-Bearing Status (Left): full weight bearing   Assistive device used: none    Observational Gait   Gait: within functional limits         Assessment & Plan     Assessment  Impairments: activity intolerance and pain with function  Functional Limitations: lifting, walking, uncomfortable because of pain and standing  Assessment details: 63 year old active male presenting exacerbation of a chronic right medial joint pain. Symptoms appear to be in sub-acute phase of flare up. He has signs and symptoms right knee patellar tendinitis, but cannot rule out plica or medial meniscus pathologies at this time.  Neurological exam is normal today.    Prognosis: good    Goals  Plan Goals: STG to be met in 4 weeks  1.  Pt has improved LE function so that his LEFS Score improves to 50/80.  2.  Pt can descend stairs with 50% less Patella tendon pain.  3.  Pt has improved quad flexibility to reduce stress under patella.  LTG to be met in 8 weeks  1.  Pt is independent with each phase of HEP.  2.  Pt has improved LE functio so that his LEFS score improves to 65/80.  3.  Pt is able run up to 1 mile without anterior knee pain.  4.  Pt is able descend stairs without anterior  knee pain.    Plan  Therapy options: will be seen for skilled therapy services  Planned modality interventions: dry needling, ultrasound, iontophoresis and high voltage pulsed current (pain management)  Planned therapy interventions: balance/weight-bearing training, flexibility, functional ROM exercises, home exercise program, joint mobilization, therapeutic activities, stretching, strengthening, postural training, neuromuscular re-education and manual therapy  Frequency: 1x week  Duration in weeks: 8    TREATMENT:  A trial of Dry Needling was performed, see flow sheet.      Access Code: [email or web address]  URL: [email or web address]  Date: 08/09/2023  Prepared by: Simone Lange    Exercises  - Prone Quadriceps Stretch with Strap  - 2 x daily - 7 x weekly - 1 sets - 4 reps - 30 seconds hold  - Doorway Kneeling Hip Flexor Stretch  - 2 x daily - 7 x weekly - 1 sets - 10 reps - 30 seconds hold  Timed:         Manual Therapy:         mins  07714;     Therapeutic Exercise:         mins  26536;     Neuromuscular Trupti:        mins  64323;    Therapeutic Activity:     10     mins  58821;     Gait Training:           mins  74902;     Ultrasound:          mins  33112;    Ionto                                   mins   15915  Self Care                            mins   43922  Canalith Repos         mins 49116      Un-Timed:  Electrical Stimulation:    15     mins  55917 ( );  Dry Needling     5     mins self-pay  Traction          mins 10679  Low Eval     23     Mins  09352  Mod Eval          Mins  65438  High Eval                            Mins  87746        Timed Treatment:   25   mins   Total Treatment:     53   mins          PT: Simone Lange PT     License Number: [id number]  Electronically signed by Simone Lange PT, 08/09/23, 3:36 PM EDT    Initial Certification  Certification Period: 11/7/2023  I certify that the therapy services are furnished while this patient is under my care.  The services outlined above are  required by this patient, and will be reviewed every 90 days.     PHYSICIAN: ________________________________________________________  Navya Mora APRN        DATE: ____________________________________________________________    Please sign and return via fax to [phone].. Thank you, Psychiatric Physical Therapy.

## 2023-08-10 RX ORDER — CARVEDILOL 25 MG/1
25 TABLET ORAL 2 TIMES DAILY WITH MEALS
Qty: 90 TABLET | Refills: 0 | Status: SHIPPED | OUTPATIENT
Start: 2023-08-10

## 2023-08-15 ENCOUNTER — TELEPHONE (OUTPATIENT)
Dept: FAMILY MEDICINE CLINIC | Facility: CLINIC | Age: 63
End: 2023-08-15

## 2023-08-15 NOTE — TELEPHONE ENCOUNTER
Okay for the HUB to read:     The last medication he was taking for his cholesterol was Crestor (rosuvastatin) that the cardiologist put him on. However, this medication is on his allergy list as causing a rash.        I called the pt and LVM advising to call the office.

## 2023-08-15 NOTE — TELEPHONE ENCOUNTER
PATIENT HAS CALLED AND STATED THAT CURRENT CHOLESTEROL MEDICATION IS NOT WORKING AND IS REQUESTING TO GO BACK  ONTO ORIGINAL CHOLESTEROL MEDICATION THAT WAS PRESCRIBED. PATIENT DOES NOT REMEMBER NAME OF ORIGINAL MEDICATION BUT STATES HE WOULD LIKE TO GO BACK TO IT.  [facility] [address] [place] [address].  PATIENT IS REQUESTING A CALL BACK ONCE NEW PRESCRIPTION HAS BEEN SENT IN FOR ORIGINAL CHOLESTEROL MEDICATION THAT PATIENT WAS TAKING.    CALL BACK NUMBER [phone]

## 2023-08-16 ENCOUNTER — TREATMENT (OUTPATIENT)
Dept: PHYSICAL THERAPY | Facility: CLINIC | Age: 63
End: 2023-08-16
Payer: COMMERCIAL

## 2023-08-16 DIAGNOSIS — M25.561 CHRONIC PAIN OF RIGHT KNEE: Primary | ICD-10-CM

## 2023-08-16 DIAGNOSIS — G89.29 CHRONIC PAIN OF RIGHT KNEE: Primary | ICD-10-CM

## 2023-08-16 DIAGNOSIS — M76.51 PATELLAR TENDINITIS OF RIGHT KNEE: ICD-10-CM

## 2023-08-16 PROCEDURE — 97110 THERAPEUTIC EXERCISES: CPT | Performed by: PHYSICAL THERAPIST

## 2023-08-16 PROCEDURE — 97140 MANUAL THERAPY 1/> REGIONS: CPT | Performed by: PHYSICAL THERAPIST

## 2023-08-16 PROCEDURE — 97035 APP MDLTY 1+ULTRASOUND EA 15: CPT | Performed by: PHYSICAL THERAPIST

## 2023-08-16 PROCEDURE — G0283 ELEC STIM OTHER THAN WOUND: HCPCS | Performed by: PHYSICAL THERAPIST

## 2023-08-17 NOTE — PROGRESS NOTES
Physical Therapy Daily Treatment Note  [facility] [place]  [address] [facility], [address]  [facility] [address]      Patient: Randy Asif   : 1960  Diagnosis/ICD-10 Code:  Chronic pain of right knee [M25.561, G89.29]  Referring practitioner: No ref. provider found  Date of Initial Visit: Type: THERAPY  Noted: 2023  Today's Date: 2023  Patient seen for 2 sessions         Visit Diagnoses:    ICD-10-CM ICD-9-CM   1. Chronic pain of right knee  M25.561 719.46    G89.29 338.29   2. Patellar tendinitis of right knee  M76.51 726.64       Subjective   Randy Asif reports: pain still in medial knee area, maybe slightly better after last treatment.    Objective          Tenderness     Right Knee   Tenderness in the LCL (distal) (Mild), LCL (proximal) (Mild), medial joint line (Anterior medial joint line.) and patellar tendon (Deep medial/proximal area).     Active Range of Motion     Right Knee   Normal active range of motion    Right Knee Flexibility Comments:   Quad and Hamstring tight.      See Exercise, Manual, and Modality Logs for complete treatment.       Assessment/Plan  Has some signs and symptoms of patellar Tendinitis, chronic. But cannot rule out a medial joint line derangement like a meniscus pathology. Tolerated treatment well.  Progress per Plan of Care and Progress strengthening /stabilization /functional activity           Timed:         Manual Therapy:    18     mins  90850;     Therapeutic Exercise:    20     mins  70439;     Neuromuscular Trupti:        mins  49618;    Therapeutic Activity:          mins  70317;     Gait Training:           mins  49698;     Ultrasound:     15     mins  28865;    Ionto                                   mins   73463  Self Care                            mins   09527  Canalith Repos         mins 58746      Un-Timed:  Electrical Stimulation:    20     mins  91565 ( );  Dry Needling     10     mins self-pay  Traction          mins  49070      Timed Treatment:   53   mins   Total Treatment:     83   mins    Simone Lange, PT  [place] License: [id number]

## 2023-08-21 DIAGNOSIS — E78.2 MIXED HYPERLIPIDEMIA: Chronic | ICD-10-CM

## 2023-08-21 RX ORDER — EZETIMIBE 10 MG/1
10 TABLET ORAL DAILY
Qty: 90 TABLET | Refills: 0 | Status: SHIPPED | OUTPATIENT
Start: 2023-08-21

## 2023-08-23 ENCOUNTER — TREATMENT (OUTPATIENT)
Dept: PHYSICAL THERAPY | Facility: CLINIC | Age: 63
End: 2023-08-23
Payer: COMMERCIAL

## 2023-08-23 DIAGNOSIS — I10 ESSENTIAL HYPERTENSION: ICD-10-CM

## 2023-08-23 DIAGNOSIS — G89.29 CHRONIC PAIN OF RIGHT KNEE: Primary | ICD-10-CM

## 2023-08-23 DIAGNOSIS — M25.561 CHRONIC PAIN OF RIGHT KNEE: Primary | ICD-10-CM

## 2023-08-23 DIAGNOSIS — M76.51 PATELLAR TENDINITIS OF RIGHT KNEE: ICD-10-CM

## 2023-08-23 PROCEDURE — 97110 THERAPEUTIC EXERCISES: CPT | Performed by: PHYSICAL THERAPIST

## 2023-08-23 PROCEDURE — 97140 MANUAL THERAPY 1/> REGIONS: CPT | Performed by: PHYSICAL THERAPIST

## 2023-08-23 PROCEDURE — 97035 APP MDLTY 1+ULTRASOUND EA 15: CPT | Performed by: PHYSICAL THERAPIST

## 2023-08-23 PROCEDURE — G0283 ELEC STIM OTHER THAN WOUND: HCPCS | Performed by: PHYSICAL THERAPIST

## 2023-08-24 NOTE — PROGRESS NOTES
Physical Therapy Daily Treatment Note  [facility] [place]  [address], [address]  [facility] [address]      Patient: Randy Asif   : 1960  Diagnosis/ICD-10 Code:  Chronic pain of right knee [M25.561, G89.29]  Referring practitioner: TANYA Blair  Date of Initial Visit: Type: THERAPY  Noted: 2023  Today's Date: 2023  Patient seen for 3 sessions         Visit Diagnoses:    ICD-10-CM ICD-9-CM   1. Chronic pain of right knee  M25.561 719.46    G89.29 338.29   2. Patellar tendinitis of right knee  M76.51 726.64       Subjective   Randy Asif reports: some improvement.  Catching toe when walking is painful.    Objective   ROM: Right knee WNL  PALPATION: Patella tendon tender, less severe.  Anterior medial joint , possible plica  See Exercise, Manual, and Modality Logs for complete treatment.       Assessment/Plan  Pt anterior right knee pain/symptoms are improving.  Symptoms may be from possible plica.  Progress per Plan of Care and Progress strengthening /stabilization /functional activity           Timed:         Manual Therapy:    15     mins  95447;     Therapeutic Exercise:    13     mins  35049;     Neuromuscular Trupti:        mins  45494;    Therapeutic Activity:          mins  01257;     Gait Training:           mins  31551;     Ultrasound:     15     mins  09266;    Ionto                                   mins   75746  Self Care                            mins   15939  Canalith Repos         mins 68059      Un-Timed:  Electrical Stimulation:    20     mins  79020 ( );  Dry Needling     10     mins self-pay  Traction          mins 06338      Timed Treatment:   42   mins   Total Treatment:     72   mins    Simone Lange, PT  [place] License: [id number]

## 2023-08-30 ENCOUNTER — TREATMENT (OUTPATIENT)
Dept: PHYSICAL THERAPY | Facility: CLINIC | Age: 63
End: 2023-08-30
Payer: COMMERCIAL

## 2023-08-30 DIAGNOSIS — G89.29 CHRONIC PAIN OF RIGHT KNEE: Primary | ICD-10-CM

## 2023-08-30 DIAGNOSIS — M25.561 CHRONIC PAIN OF RIGHT KNEE: Primary | ICD-10-CM

## 2023-08-30 DIAGNOSIS — M76.51 PATELLAR TENDINITIS OF RIGHT KNEE: ICD-10-CM

## 2023-08-30 PROCEDURE — G0283 ELEC STIM OTHER THAN WOUND: HCPCS | Performed by: PHYSICAL THERAPIST

## 2023-08-30 PROCEDURE — 97035 APP MDLTY 1+ULTRASOUND EA 15: CPT | Performed by: PHYSICAL THERAPIST

## 2023-08-30 PROCEDURE — 97140 MANUAL THERAPY 1/> REGIONS: CPT | Performed by: PHYSICAL THERAPIST

## 2023-08-31 NOTE — PROGRESS NOTES
Physical Therapy Daily Treatment Note  [facility] [place]  [address], [address]  [facility] [address]      Patient: Randy Asif   : 1960  Diagnosis/ICD-10 Code:  Chronic pain of right knee [M25.561, G89.29]  Referring practitioner: TANYA Blair  Date of Initial Visit: Type: THERAPY  Noted: 2023  Today's Date: 2023  Patient seen for 4 sessions         Visit Diagnoses:    ICD-10-CM ICD-9-CM   1. Chronic pain of right knee  M25.561 719.46    G89.29 338.29   2. Patellar tendinitis of right knee  M76.51 726.64       Subjective   Randy Asif reports: greater than 50% improved.  Catching toe is not as painful. Still gets knee in certain stress can feel anterior medial pain.  Intensity of pain decreased as well as the frequency.    Objective   PALPATION: anterior medial joint line, possible plica.  Medial side of patella tendon.    See Exercise, Manual, and Modality Logs for complete treatment.       Assessment/Plan  Suspect a pathological plica and patella tendinitis.  Both seem to be improving.  Progress per Plan of Care and Progress strengthening /stabilization /functional activity           Timed:         Manual Therapy:    15     mins  81460;     Therapeutic Exercise:         mins  32608;     Neuromuscular Trupti:        mins  46488;    Therapeutic Activity:          mins  59237;     Gait Training:           mins  02199;     Ultrasound:     13     mins  61850;    Ionto                                   mins   24458  Self Care                            mins   85292  Canalith Repos         mins 85315      Un-Timed:  Electrical Stimulation:    20     mins  52499 ( );  Dry Needling          mins self-pay  Traction          mins 42659      Timed Treatment:   28   mins   Total Treatment:     48   mins    Simone Lange, PT  [place] License: [id number]

## 2023-09-01 ENCOUNTER — OFFICE VISIT (OUTPATIENT)
Dept: FAMILY MEDICINE CLINIC | Facility: CLINIC | Age: 63
End: 2023-09-01
Payer: COMMERCIAL

## 2023-09-01 VITALS
WEIGHT: 231.6 LBS | TEMPERATURE: 98.7 F | HEIGHT: 76 IN | OXYGEN SATURATION: 97 % | SYSTOLIC BLOOD PRESSURE: 129 MMHG | DIASTOLIC BLOOD PRESSURE: 74 MMHG | BODY MASS INDEX: 28.2 KG/M2 | HEART RATE: 65 BPM

## 2023-09-01 DIAGNOSIS — R35.0 URINARY FREQUENCY: ICD-10-CM

## 2023-09-01 DIAGNOSIS — R29.818 SUSPECTED SLEEP APNEA: ICD-10-CM

## 2023-09-01 DIAGNOSIS — E78.2 MIXED HYPERLIPIDEMIA: Primary | ICD-10-CM

## 2023-09-01 DIAGNOSIS — Z12.5 SCREENING FOR MALIGNANT NEOPLASM OF PROSTATE: ICD-10-CM

## 2023-09-01 DIAGNOSIS — R35.1 NOCTURIA: ICD-10-CM

## 2023-09-01 PROCEDURE — 99214 OFFICE O/P EST MOD 30 MIN: CPT | Performed by: NURSE PRACTITIONER

## 2023-09-01 RX ORDER — ATORVASTATIN CALCIUM 20 MG/1
20 TABLET, FILM COATED ORAL DAILY
Qty: 90 TABLET | Refills: 0 | Status: SHIPPED | OUTPATIENT
Start: 2023-09-01

## 2023-09-04 NOTE — ASSESSMENT & PLAN NOTE
Lipid abnormalities are worsening.  Pharmacotherapy as ordered.  Lipids will be reassessed in 3 months.  Plan to discontinue Zetia and begin atorvastatin.

## 2023-09-04 NOTE — PROGRESS NOTES
Follow Up Office Note     Patient Name: Randy Asif  : 1960   MRN: [id number]     Chief Complaint:    Chief Complaint   Patient presents with    Follow-up     Per patient he wants to stop the cholesterol medication he is on and go back to the old one.    Hyperlipidemia    Nocturia     And frequency during the day    suspected sleep apnea       History of Present Illness: Randy Asif is a 63 y.o. male who presents today for reevaluation and management of chronic hyperlipidemia.  Patient has been taking Zetia which unfortunately has not been controlling his lipid levels.  Patient would like to discuss going back on a statin.  Patient was on rosuvastatin at one time and thought that he had a rash that was related to this medication.  Patient states that he determined later that the rash was caused by the statin medication but by something  else he was exposed to at the time.     Lipid Panel (2023 08:56)     Patient also requesting referral to urology.  He is planing of chronic urinary frequency with nocturia x several months.  He denies dysuria or hematuria.  Patient denies difficulty initiating urination, weak stream, straining with urination, feeling of incomplete emptying.    Patient requesting referral to sleep medicine. Patient snoring at night. He is concerned that he may have sleep apnea and would like to be evaluated for this.      Subjective      I have reviewed and the following portions of the patient's history were updated as appropriate: past family history, past medical history, past social history, past surgical history and problem list.    Review of Systems:   Review of Systems   Constitutional: Negative.    Respiratory:  Negative for cough, shortness of breath, wheezing and stridor.    Cardiovascular: Negative.    Genitourinary:  Positive for frequency. Negative for difficulty urinating, dysuria, flank pain, hematuria and urgency.   Psychiatric/Behavioral:  Positive for  "sleep disturbance.       Past Medical History:   Past Medical History:   Diagnosis Date    Abnormal finding on MRI of brain     Asthmatic bronchitis     Elevated glucose     Hyperlipidemia 2/5/2022    Hypertension 2/5/2018    Shingles          Medications:     Current Outpatient Medications:     carvedilol (COREG) 25 MG tablet, Take 1 tablet by mouth 2 (Two) Times a Day With Meals., Disp: 90 tablet, Rfl: 0    clobetasol (TEMOVATE) 0.05 % cream, Apply to affected areas in thin layer BID, Disp: 30 g, Rfl: 0    indapamide (LOZOL) 2.5 MG tablet, Take 1 tablet by mouth Every Morning., Disp: 90 tablet, Rfl: 3    lamoTRIgine (LaMICtal) 200 MG tablet, Take 1 tablet by mouth 2 (Two) Times a Day., Disp: , Rfl:     metoprolol succinate XL (TOPROL-XL) 100 MG 24 hr tablet, Two times a day, Disp: , Rfl:     sildenafil (REVATIO) 20 MG tablet, Take 5 tablets by mouth Daily As Needed (erectile dysfunction)., Disp: 120 tablet, Rfl: 3    atorvastatin (LIPITOR) 20 MG tablet, Take 1 tablet by mouth Daily., Disp: 90 tablet, Rfl: 0    Allergies:   Allergies   Allergen Reactions    Indapamide Myalgia     Muscle cramps      Lisinopril Cough    Oxycodone-Acetaminophen Anxiety and Unknown - Low Severity    Percocet [Oxycodone-Acetaminophen] Anxiety         Objective     Physical Exam:  Vital Signs:   Vitals:    09/01/23 1316   BP: 129/74   Pulse: 65   Temp: 98.7 °F (37.1 °C)   TempSrc: Infrared   SpO2: 97%   Weight: 105 kg (231 lb 9.6 oz)   Height: 193 cm (75.98\")   PainSc: 0-No pain     Body mass index is 28.2 kg/m².     Physical Exam  Vitals and nursing note reviewed.   Constitutional:       General: He is not in acute distress.     Appearance: Normal appearance. He is well-developed. He is not ill-appearing, toxic-appearing or diaphoretic.   HENT:      Head: Normocephalic and atraumatic.   Cardiovascular:      Rate and Rhythm: Normal rate and regular rhythm.   Pulmonary:      Effort: Pulmonary effort is normal. No respiratory distress.     "  Breath sounds: Normal breath sounds. No stridor. No wheezing.   Skin:     General: Skin is warm and dry.   Neurological:      General: No focal deficit present.      Mental Status: He is alert and oriented to person, place, and time.   Psychiatric:         Mood and Affect: Mood normal.         Behavior: Behavior normal. Behavior is cooperative.         Thought Content: Thought content normal.         Judgment: Judgment normal.       Assessment / Plan      Assessment/Plan:   Diagnoses and all orders for this visit:    1. Mixed hyperlipidemia (Primary)  Assessment & Plan:  Lipid abnormalities are worsening.  Pharmacotherapy as ordered.  Lipids will be reassessed in 3 months.  Plan to discontinue Zetia and begin atorvastatin.    Orders:  -     atorvastatin (LIPITOR) 20 MG tablet; Take 1 tablet by mouth Daily.  Dispense: 90 tablet; Refill: 0  -     Lipid Panel; Future    2. Urinary frequency  -     Ambulatory Referral to Urology    3. Nocturia  -     Ambulatory Referral to Urology    4. Suspected sleep apnea  -     Ambulatory Referral to Sleep Medicine    5. Screening for malignant neoplasm of prostate  -     PSA Screen; Future           Follow Up:   PRN and at next scheduled appointment(s) with PCP.    Discussed the nature of the medical condition(s) risks, complications, implications, management, safe and proper use of medications. Encouraged medication compliance, and keeping scheduled follow up appointments with me and any other providers.      RTC if symptoms fail to improve, to ER if symptoms worsen.        *Dictated Utilizing Dragon Dictation   Please note that portions of this note were completed with a voice recognition program.   Part of this note may be an electronic transcription/translation of spoken language to printed text using the Dragon Dictation System. Spelling and/or grammatical errors may exist despite efforts at proofreading.      NOTE TO PATIENT: The 21st Century Cures Act makes medical notes  like these available to patients in the interest of transparency. However, be advised this is a medical document. It is intended as peer to peer communication. It is written in medical language and may contain abbreviations or verbiage that are unfamiliar. It may appear blunt or direct. Medical documents are intended to carry relevant information, facts as evident, and the clinical opinion of the practitioner.      TANYA Blair  [facility] [place]

## 2023-09-20 ENCOUNTER — TELEPHONE (OUTPATIENT)
Dept: CARDIOLOGY | Facility: CLINIC | Age: 63
End: 2023-09-20

## 2023-09-20 NOTE — TELEPHONE ENCOUNTER
Caller: Randy Asif Dale    Relationship to patient: Self    Best call back number: [phone]        Type of visit: F/U    Requested date: OCTOBER OR NOVEMBER     If rescheduling, when is the original appointment: 09-27-23     Additional notes:PATIENT NEEDS A 6 MO FOLLOW UP. NEXT AVAIL IS . PLEASE CONTACT PATIENT WITH A MORE SUITABLE DATE.

## 2023-09-22 ENCOUNTER — OFFICE VISIT (OUTPATIENT)
Dept: CARDIOLOGY | Facility: CLINIC | Age: 63
End: 2023-09-22
Payer: COMMERCIAL

## 2023-09-22 VITALS
WEIGHT: 230.4 LBS | BODY MASS INDEX: 28.06 KG/M2 | DIASTOLIC BLOOD PRESSURE: 72 MMHG | HEIGHT: 76 IN | OXYGEN SATURATION: 92 % | HEART RATE: 70 BPM | SYSTOLIC BLOOD PRESSURE: 116 MMHG

## 2023-09-22 DIAGNOSIS — E78.2 MIXED HYPERLIPIDEMIA: Chronic | ICD-10-CM

## 2023-09-22 DIAGNOSIS — I10 PRIMARY HYPERTENSION: Chronic | ICD-10-CM

## 2023-09-22 DIAGNOSIS — I10 ESSENTIAL HYPERTENSION: ICD-10-CM

## 2023-09-22 DIAGNOSIS — I49.3 PVC'S (PREMATURE VENTRICULAR CONTRACTIONS): Primary | Chronic | ICD-10-CM

## 2023-09-22 PROCEDURE — 99214 OFFICE O/P EST MOD 30 MIN: CPT | Performed by: INTERNAL MEDICINE

## 2023-09-22 RX ORDER — ATORVASTATIN CALCIUM 20 MG/1
20 TABLET, FILM COATED ORAL DAILY
Qty: 90 TABLET | Refills: 3 | Status: SHIPPED | OUTPATIENT
Start: 2023-09-22

## 2023-09-22 NOTE — TELEPHONE ENCOUNTER
Rx Refill Note  Requested Prescriptions     Pending Prescriptions Disp Refills    carvedilol (COREG) 25 MG tablet [Pharmacy Med Name: CARVEDILOL 25 MG TABLET] 90 tablet 0     Sig: TAKE 1 TABLET BY MOUTH TWICE A DAY WITH A MEAL      Last office visit with prescribing clinician: 9/1/2023   Last telemedicine visit with prescribing clinician: Visit date not found   Next office visit with prescribing clinician: 12/1/2023                         Would you like a call back once the refill request has been completed: [] Yes [] No    If the office needs to give you a call back, can they leave a voicemail: [] Yes [] No    Clarisse Becker LPN  09/22/23, 08:48 EDT

## 2023-09-22 NOTE — PROGRESS NOTES
[facility] Cardiology  Office visit  Randy Asif  1960  [phone]  [phone] (work)    VISIT DATE:  9/22/2023    PCP: Navya Mora APRN  [address] [address]    CC:  Chief Complaint   Patient presents with    PVC's (premature ventricular contractions)    Coronary artery calcification       Previous cardiac studies and procedures:  May 2022 coronary calcium score  Left main: 243  Left anterior descending (LAD): 86  Left circumflex: 0  Right coronary artery (RCA): 35  1.  Calcium score (Agatston): 359.     October 2022 exercise treadmill test: Normal    ASSESSMENT:   Diagnosis Plan   1. PVC's (premature ventricular contractions)        2. Primary hypertension        3. Mixed hyperlipidemia  atorvastatin (LIPITOR) 20 MG tablet    Lipid Panel          PLAN:  Coronary artery calcification: Elevated coronary calcium score, predominantly coming from the left main and ostial to proximal LAD.  Age and gender based percentile in the 75-90th percentile range.  Afterload well controlled.  Intolerances rosuvastatin.  Restarting atorvastatin 20 mg p.o. daily.  Zetia previously ineffective.  Repeat fasting lipid panel in 4 to 6 weeks.    Hypertension: Goal less than 130/80 mmHg.  Currently well controlled.  Agree with current medical therapy.     PVCs: Not significant affecting quality of life.  Limited burden of arrhythmia.    Subjective  Interval assessment: Denies chest pain, palpitations or dyspnea.  Blood pressures running less than 130/80 mmHg.      Initial evaluation: 62-year-old gentleman with a longstanding history of symptomatic PVCs, hypertension and dyslipidemia with recent elevated coronary calcium score.  Exercises on a regular basis without limitation.  Blood pressures running less than 130/80 mmHg.  Intermittent symptomatic PVCs which she has had for many years, improved with decreasing caffeine intake.  Not significantly affecting  "quality of life.    PHYSICAL EXAMINATION:  Vitals:    09/22/23 1310   BP: 116/72   BP Location: Right arm   Patient Position: Sitting   Pulse: 70   SpO2: 92%   Weight: 105 kg (230 lb 6.4 oz)   Height: 193 cm (76\")     General Appearance:    Alert, cooperative, no distress, appears stated age   Head:    Normocephalic, without obvious abnormality, atraumatic   Eyes:    conjunctiva/corneas clear   Nose:   Nares normal, septum midline, mucosa normal, no drainage   Throat:   Lips, teeth and gums normal   Neck:   Supple, symmetrical, trachea midline, no carotid    bruit or JVD   Lungs:     Clear to auscultation bilaterally, respirations unlabored   Chest Wall:    No tenderness or deformity    Heart:    Regular rate and rhythm, S1 and S2 normal, no murmur, rub   or gallop, normal carotid impulse bilaterally without bruit.   Abdomen:     Soft, non-tender   Extremities:   Extremities normal, atraumatic, no cyanosis or edema   Pulses:   2+ and symmetric all extremities   Skin:   Skin color, texture, turgor normal, no rashes or lesions       Diagnostic Data:  Procedures  Lab Results   Component Value Date    CHLPL 207 (H) 03/12/2015    TRIG 137 07/27/2023    HDL 36 (L) 07/27/2023     Lab Results   Component Value Date    GLUCOSE 116 (H) 07/27/2023    BUN 15 07/27/2023    CREATININE 0.94 07/27/2023     07/27/2023    K 3.4 (L) 07/27/2023    CL 97 (L) 07/27/2023    CO2 30.1 (H) 07/27/2023     No results found for: HGBA1C  Lab Results   Component Value Date    WBC 6.39 04/08/2022    HGB 15.9 04/08/2022    HCT 46.6 04/08/2022     04/08/2022       Allergies  Allergies   Allergen Reactions    Indapamide Myalgia     Muscle cramps      Lisinopril Cough    Oxycodone-Acetaminophen Anxiety and Unknown - Low Severity    Percocet [Oxycodone-Acetaminophen] Anxiety       Current Medications    Current Outpatient Medications:     atorvastatin (LIPITOR) 20 MG tablet, Take 1 tablet by mouth Daily., Disp: 90 tablet, Rfl: 3    " carvedilol (COREG) 25 MG tablet, Take 1 tablet by mouth 2 (Two) Times a Day With Meals., Disp: 90 tablet, Rfl: 0    clobetasol (TEMOVATE) 0.05 % cream, Apply to affected areas in thin layer BID, Disp: 30 g, Rfl: 0    indapamide (LOZOL) 2.5 MG tablet, Take 1 tablet by mouth Every Morning., Disp: 90 tablet, Rfl: 3    lamoTRIgine (LaMICtal) 200 MG tablet, Take 1 tablet by mouth 2 (Two) Times a Day., Disp: , Rfl:     sildenafil (REVATIO) 20 MG tablet, Take 5 tablets by mouth Daily As Needed (erectile dysfunction)., Disp: 120 tablet, Rfl: 3          ROS  ROS      SOCIAL HX  Social History     Socioeconomic History    Marital status:    Tobacco Use    Smoking status: Never    Smokeless tobacco: Never   Vaping Use    Vaping Use: Never used   Substance and Sexual Activity    Alcohol use: Not Currently    Drug use: Never    Sexual activity: Yes     Partners: Female     Comment: Single        FAMILY HX  Family History   Problem Relation Age of Onset    No Known Problems Mother     Heart disease Father     Kidney disease Father     Heart attack Father     Thyroid disease Sister     No Known Problems Brother     No Known Problems Maternal Grandmother     No Known Problems Maternal Grandfather     No Known Problems Paternal Grandmother     No Known Problems Paternal Grandfather              Bernardo Rice III, MD, FACC

## 2023-09-27 ENCOUNTER — OFFICE VISIT (OUTPATIENT)
Dept: UROLOGY | Facility: CLINIC | Age: 63
End: 2023-09-27
Payer: COMMERCIAL

## 2023-09-27 VITALS — WEIGHT: 230 LBS | HEART RATE: 64 BPM | HEIGHT: 76 IN | OXYGEN SATURATION: 97 % | BODY MASS INDEX: 28.01 KG/M2

## 2023-09-27 DIAGNOSIS — N40.0 BPH WITHOUT URINARY OBSTRUCTION: Primary | Chronic | ICD-10-CM

## 2023-09-27 DIAGNOSIS — R39.9 LOWER URINARY TRACT SYMPTOMS (LUTS): ICD-10-CM

## 2023-09-27 NOTE — PROGRESS NOTES
LUTS Male Office Visit      Patient Name: Randy Asif  : 1960   MRN: [id number]     Chief Complaint: Lower Urinary Tract Symptoms    Referring Provider: Navya Mora APRN    History of Present Illness: Mr. Asif is a 63 y.o. male with history of lower urinary tract symptoms.  Patient presents today for evaluation with primary report of storage based urinary symptoms, mild voiding symptoms.  Her bother symptoms include urinary frequency, urgency, intermittency.  He denies dysuria, history of gross hematuria, urinary tract infection.  Is not previously trialed medical therapy.  Has past urologic instrumentation or procedure.    IPSS 9, PVR Wente for mL.      Subjective      Review of System: Review of Systems   Genitourinary:  Positive for frequency. Negative for decreased urine volume, difficulty urinating, dysuria, enuresis, flank pain, hematuria and urgency.      I have reviewed the ROS documented by my clinical staff, updated appropriate and I agree. Obinna Sykes MD    Past Medical History:  Past Medical History:   Diagnosis Date    Abnormal finding on MRI of brain     Asthmatic bronchitis     Elevated glucose     Hyperlipidemia 2022    Hypertension 2018    Shingles        Past Surgical History:  Past Surgical History:   Procedure Laterality Date    BRAIN TUMOR EXCISION      COLONOSCOPY      complete   repeat 10 yrs    COLONOSCOPY         Medications:    Current Outpatient Medications:     atorvastatin (LIPITOR) 20 MG tablet, Take 1 tablet by mouth Daily., Disp: 90 tablet, Rfl: 3    carvedilol (COREG) 25 MG tablet, Take 1 tablet by mouth 2 (Two) Times a Day With Meals., Disp: 90 tablet, Rfl: 0    clobetasol (TEMOVATE) 0.05 % cream, Apply to affected areas in thin layer BID, Disp: 30 g, Rfl: 0    indapamide (LOZOL) 2.5 MG tablet, Take 1 tablet by mouth Every Morning., Disp: 90 tablet, Rfl: 3    lamoTRIgine (LaMICtal) 200 MG tablet, Take 1 tablet by mouth 2 (Two) Times  a Day., Disp: , Rfl:     sildenafil (REVATIO) 20 MG tablet, Take 5 tablets by mouth Daily As Needed (erectile dysfunction)., Disp: 120 tablet, Rfl: 3    Allergies:  Allergies   Allergen Reactions    Indapamide Myalgia     Muscle cramps      Lisinopril Cough    Oxycodone-Acetaminophen Anxiety and Unknown - Low Severity    Percocet [Oxycodone-Acetaminophen] Anxiety       Social History:  Social History     Socioeconomic History    Marital status:    Tobacco Use    Smoking status: Never     Passive exposure: Never    Smokeless tobacco: Never   Vaping Use    Vaping Use: Never used   Substance and Sexual Activity    Alcohol use: Not Currently    Drug use: Never    Sexual activity: Yes     Partners: Female     Comment: Single        Family History:  Family History   Problem Relation Age of Onset    No Known Problems Mother     Heart disease Father     Kidney disease Father     Heart attack Father     Thyroid disease Sister     No Known Problems Brother     No Known Problems Maternal Grandmother     No Known Problems Maternal Grandfather     No Known Problems Paternal Grandmother     No Known Problems Paternal Grandfather        IPSS Questionnaire (AUA-7):  Over the past month…    1)  Incomplete Emptying  How often have you had a sensation of not emptying your bladder?  0 - Not at all   2)  Frequency  How often have you had to urinate less than every two hours? 2 - Less than half the time   3)  Intermittency  How often have you found you stopped and started again several times when you urinated?  2 - Less than half the time   4) Urgency  How often have you found it difficult to postpone urination?  1 - Less than 1 time in 5   5) Weak Stream  How often have you had a weak urinary stream?  0 - Not at all   6) Straining  How often have you had to push or strain to begin urination?  0 - Not at all   7) Nocturia  How many times did you typically get up at night to urinate?  4 - 4 times   Total Score:  9       Quality of  "life due to urinary symptoms:  If you were to spend the rest of your life with your urinary condition the way it is now, how would you feel about that? 2-Mostly Satisfied   Urine Leakage (Incontinence) 0-No Leakage       Post void residual bladder scan:   024mL     Objective     Physical Exam:     Vital Signs:   Vitals:    09/27/23 0800   Pulse: 64   SpO2: 97%   Weight: 104 kg (230 lb)   Height: 193 cm (75.98\")   PainSc: 0-No pain     Body mass index is 28.01 kg/m².     Physical Exam  Vitals and nursing note reviewed.   Constitutional:       Appearance: Normal appearance.   HENT:      Head: Normocephalic and atraumatic.   Cardiovascular:      Comments: Well perfused  Pulmonary:      Effort: Pulmonary effort is normal.   Abdominal:      General: Abdomen is flat.      Palpations: Abdomen is soft.   Musculoskeletal:         General: Normal range of motion.   Skin:     General: Skin is warm and dry.   Neurological:      General: No focal deficit present.      Mental Status: He is alert and oriented to person, place, and time. Mental status is at baseline.   Psychiatric:         Mood and Affect: Mood normal.         Behavior: Behavior normal.         Thought Content: Thought content normal.         Judgment: Judgment normal.           Labs:   Lab Results   Component Value Date    PSA 0.814 04/16/2021    PSA 1.220 06/21/2018    PSA 1.7 03/12/2015       Brief Urine Lab Results       None                 Lab Results   Component Value Date    GLUCOSE 116 (H) 07/27/2023    CALCIUM 9.9 07/27/2023     07/27/2023    K 3.4 (L) 07/27/2023    CO2 30.1 (H) 07/27/2023    CL 97 (L) 07/27/2023    BUN 15 07/27/2023    CREATININE 0.94 07/27/2023    EGFRIFAFRI 114 03/12/2015    EGFRIFNONA 88 04/16/2021    BCR 16.0 07/27/2023    ANIONGAP 11.9 07/27/2023       Lab Results   Component Value Date    WBC 6.39 04/08/2022    HGB 15.9 04/08/2022    HCT 46.6 04/08/2022    MCV 91.9 04/08/2022     04/08/2022       Images:   No Images in " the past 120 days found..    Measures:   Tobacco:   Randy Asif  reports that he has never smoked. He has never been exposed to tobacco smoke. He has never used smokeless tobacco.. I have educated him on the risk of diseases from using tobacco products.    Assessment / Plan      Assessment:  Mr. Asif is a 63 y.o. male who presents with lower urinary tract symptoms. He reports bother associate with primary storage urinary symptoms, mild voiding symptoms.. IPSS today is 9. PVR today is 23ml    Diagnoses and all orders for this visit:    1. BPH without urinary obstruction (Primary)    2. Lower urinary tract symptoms (LUTS)           Patient Education:     Today we discussed the etiology and management of lower urinary tract symptoms.  We discussed work-up including history and physical exam, symptom questionnaire, PVR. We discussed benign growth of the prostate as men age.  We discussed this occurs mostly in the transition zone of the prostate.  We discussed there is both an increase the in the amount of prostatic stroma as well as the number of alpha-1 receptors in the prostate.  We discussed that as the prostate grows there can be increased bladder outlet resistance.  We discussed this results in increased smooth muscle muscle tone which can cause long-term changes to the bladder.  We discussed the presentation and symptoms including decreased force of stream, hesitancy, intermittent stream, incomplete emptying, frequency, urgency, incontinence, nocturia. We discussed that his lower urinary tract symptoms are both storage and voiding symptoms.  We discussed that if the symptoms are prolonged the bladder may be decompensated resulting in absent or ineffective contractions.  We discussed that the severity of symptoms do not always correlate with the exact prostate size.  Discussed that the severity of urinary symptoms do not correlate with the degree of bladder outlet obstruction.  We discussed the indication  for operative intervention includes hematuria, recurrent urinary tract infection, damage to the kidney and change in renal function, acute urinary retention.  We discussed that the patient does not have any of these for indications symptom bother may direct desire for operative intervention.    We discussed management strategies including conservative measures to improve symptoms.  We discussed the importance of decreasing caffeinated and irritative beverages, increasing fluid hydration.  We discussed the role that obstipation can play in urinary symptoms and the importance of a bowel regimen.  We discussed medical management and medical options to improve urinary symptoms.  We discussed alpha-blocker, risks and benefits of this medication.  We discussed 5 alpha reductase inhibitor, risks and benefits of this medication.     We discussed cystoscopy, transrectal ultrasound of the prostate for volume.  We discussed that cystoscopy will allow evaluation of the urethra, prostatic architecture/anatomy, health of the bladder.  We discussed transrectal ultrasound of the prostate for volume will allow us to measure the exact size of the prostate.  We discussed the importance of these work-ups in identifying and tailoring a specific treatment strategy to the patient.     We discussed that based upon the work-up described above we will further decide an appropriate treatment plan.  We discussed that these procedures will help us identify whether p.o. medical management versus procedure for bladder outlet obstruction is warranted.    He is understanding and agreeable with plan of care.    Follow Up:   Return in about 3 weeks (around 10/18/2023) for Recheck.    I spent approximately 45 minutes providing clinical care for this patient; including review of patient's chart and provider documentation, face to face time spent with patient in examination room (obtaining history, performing physical exam, discussing diagnosis and  management options), placing orders, and completing patient documentation.     Obinna Sykes MD  [facility] [place]

## 2023-10-05 PROBLEM — R39.9 LOWER URINARY TRACT SYMPTOMS (LUTS): Status: ACTIVE | Noted: 2023-10-05

## 2023-10-07 RX ORDER — INDAPAMIDE 2.5 MG/1
2.5 TABLET ORAL EVERY MORNING
Qty: 90 TABLET | Refills: 3 | Status: SHIPPED | OUTPATIENT
Start: 2023-10-07

## 2023-10-07 NOTE — TELEPHONE ENCOUNTER
Rx Refill Note  Requested Prescriptions     Pending Prescriptions Disp Refills    indapamide (LOZOL) 2.5 MG tablet [Pharmacy Med Name: INDAPAMIDE 2.5 MG TABLET] 90 tablet 3     Sig: TAKE ONE TABLET BY MOUTH EVERY MORNING      Last office visit with prescribing clinician: 9/1/2023   Last telemedicine visit with prescribing clinician: Visit date not found   Next office visit with prescribing clinician: 12/1/2023                         Would you like a call back once the refill request has been completed: [] Yes [] No    If the office needs to give you a call back, can they leave a voicemail: [] Yes [] No    Jessica De Souza  10/07/23, 12:29 EDT

## 2023-11-13 NOTE — PROGRESS NOTES
Sleep Clinic Video Visit Consult Note    The patient is located in [facility]. The patient presents today for telehealth service.  This service was conducted via audio/video technology through a secure [employer] video visit connection through Epic.  This provider is located in [facility].  Patient stated they are in a secure environment for the session.  Patient's condition being diagnosed/treated is appropriate for telemedicine.  The provider identified himself as well as his credentials.  The patient, and/or patient's guardian, consent to be seen remotely, and when consent is given they understanding that the consent allows for patient identifiable information to be sent to a third-party as needed.  They may refuse to be seen remotely at any time.  The electronic data is encrypted and password protected, and the patient and/or guardian has been advised of the potential risk to privacy not withstanding such measures.  Patient identifiers used: Name and date of birth.     You have chosen to receive care through a telehealth visit.  Do you consent to use a video/audio connection for your medical care today? Yes     Chief Complaint  Sleep problem    Subjective     History of Present Illness:  Randy Asif is a 63 y.o. male with a history of hypertension, hyperlipidemia, and asthmatic bronchitis.  The patient is referred by Navya MARTÍNEZ with primary care.  He has had worsening snoring. He has had difficulty with nocturia. He is taking naps during the day.    Further details are as follows:    Boyd Scale is (out of 24): Total score: 11     Estimated average amount of sleep per night: 6-8 hours  Number of times he wakes up at night: 4-5 times  Difficulty falling back asleep: not usually  It usually takes 8 minutes to go to sleep.  He feels sleepy upon waking up: yes  Rotating or night shift work: no    Drowsiness/Sleepiness:  He exhibits the following:  excessive daytime  "sleepiness  excessive daytime fatigue  Naps     Snoring/Breathing:  He exhibits the following:  loud snoring  awoken with dry mouth    Head Injury:  He exhibits the following:  Brain tumor age 20    Reflux:  He describes the following:  none    Narcolepsy:  He exhibits the following:  none    RLS/PLMs:  He describes the following:  none    Insomnia:  He describes the following:  frequent awakenings    Parasomnia:  He exhibits the following:  grinds teeth    Weight:  Weight change in the last year:  gain: 0 lbs    The patient's relevant past medical, surgical, family, and social history reviewed and updated in Epic as appropriate.    Review of Systems    Objective   Vital Signs:  Ht 193 cm (76\")   Wt 104 kg (230 lb)   BMI 28.00 kg/m²              Physical Exam  Constitutional:       Appearance: Normal appearance.   Neurological:      Mental Status: He is alert and oriented to person, place, and time.   Psychiatric:         Mood and Affect: Mood normal.         Behavior: Behavior normal.         Thought Content: Thought content normal.         Judgment: Judgment normal.             Result Review :              Assessment and Plan  Randy Asif is a 63 y.o. male with a past medical history of hypertension, hyperlipidemia, and asthmatic bronchitis who presents for further evaluation of excessive daytime sleepiness and fatigue, nonrestorative sleep, and concerns for sleep disordered breathing and obstructive sleep apnea. The patient's symptoms, particularly snoring are concerning for significant sleep disordered breathing and obstructive sleep apnea. We will obtain a home sleep test for further evaluation.  The patient will return for follow-up and recommendations after test.  I have discussed weight loss as it pertains to obstructive sleep apnea.    Diagnoses and all orders for this visit:    1. Snoring (Primary)  -     Home Sleep Study; Future    2. Suspected sleep apnea  -     Home Sleep Study; Future    3. " Hypersomnia with sleep apnea  -     Home Sleep Study; Future                 I discussed the consequences of uncontrolled sleep apnea including hypertension, heart disease, diabetes, stroke, and dementia. I further discussed sleep apnea therapeutic options including CPAP, Weight loss, Oral dental appliance, and surgery.         Follow Up  Return for Follow up after study.  Patient was given instructions and counseling regarding his condition or for health maintenance advice. Please see specific information pulled into the AVS if appropriate.     TANYA Reyes, ACNP-BC  Pulmonary, Critical Care Medicine, and Sleep Medicine

## 2023-11-17 ENCOUNTER — TELEMEDICINE (OUTPATIENT)
Dept: SLEEP MEDICINE | Facility: HOSPITAL | Age: 63
End: 2023-11-17
Payer: COMMERCIAL

## 2023-11-17 VITALS — WEIGHT: 230 LBS | HEIGHT: 76 IN | BODY MASS INDEX: 28.01 KG/M2

## 2023-11-17 DIAGNOSIS — G47.30 HYPERSOMNIA WITH SLEEP APNEA: ICD-10-CM

## 2023-11-17 DIAGNOSIS — R29.818 SUSPECTED SLEEP APNEA: ICD-10-CM

## 2023-11-17 DIAGNOSIS — R06.83 SNORING: Primary | ICD-10-CM

## 2023-11-17 DIAGNOSIS — G47.10 HYPERSOMNIA WITH SLEEP APNEA: ICD-10-CM

## 2023-11-27 RX ORDER — INDAPAMIDE 2.5 MG/1
2.5 TABLET ORAL EVERY MORNING
Qty: 90 TABLET | Refills: 3 | OUTPATIENT
Start: 2023-11-27

## 2023-11-27 NOTE — TELEPHONE ENCOUNTER
[facility]. I sent it back for a med refill just now for the  indapamide (LOZOL) 2.5 MG. He would like to use the [facility] pharmacy off [facility].    Phone: [phone] Fax: [phone]

## 2023-11-29 DIAGNOSIS — I10 ESSENTIAL HYPERTENSION: ICD-10-CM

## 2023-11-29 RX ORDER — CARVEDILOL 25 MG/1
TABLET ORAL
Qty: 90 TABLET | Refills: 0 | OUTPATIENT
Start: 2023-11-29

## 2023-11-29 RX ORDER — CARVEDILOL 25 MG/1
25 TABLET ORAL 2 TIMES DAILY WITH MEALS
Qty: 90 TABLET | Refills: 0 | Status: SHIPPED | OUTPATIENT
Start: 2023-11-29

## 2023-11-29 NOTE — TELEPHONE ENCOUNTER
Caller: [facility] [id number]  AT [facility] - [phone]  - [phone] FX    Relationship: Pharmacy    Best call back number: [phone]    Requested Prescriptions:   Requested Prescriptions     Pending Prescriptions Disp Refills    carvedilol (COREG) 25 MG tablet 90 tablet 0     Sig: Take 1 tablet by mouth 2 (Two) Times a Day With Meals.        Pharmacy where request should be sent: [facility] [id number]  AT [facility] - [phone] PH - [phone] FX     Last office visit with prescribing clinician: 9/1/2023   Last telemedicine visit with prescribing clinician: Visit date not found   Next office visit with prescribing clinician: Visit date not found     Additional details provided by patient: PATIENT CONTACTED PHARMACY REGARDING NEEDING REFILL    Does the patient have less than a 3 day supply:  [x] Yes  [] No    Would you like a call back once the refill request has been completed: [] Yes [x] No    If the office needs to give you a call back, can they leave a voicemail: [] Yes [x] No    Benjy Ramsey Rep   11/29/23 12:07 EST

## 2023-12-07 ENCOUNTER — HOSPITAL ENCOUNTER (OUTPATIENT)
Dept: SLEEP MEDICINE | Facility: HOSPITAL | Age: 63
End: 2023-12-07
Payer: COMMERCIAL

## 2023-12-07 VITALS — WEIGHT: 230 LBS | BODY MASS INDEX: 28.01 KG/M2 | HEIGHT: 76 IN

## 2023-12-07 DIAGNOSIS — G47.30 HYPERSOMNIA WITH SLEEP APNEA: ICD-10-CM

## 2023-12-07 DIAGNOSIS — G47.10 HYPERSOMNIA WITH SLEEP APNEA: ICD-10-CM

## 2023-12-07 DIAGNOSIS — R29.818 SUSPECTED SLEEP APNEA: ICD-10-CM

## 2023-12-07 DIAGNOSIS — R06.83 SNORING: ICD-10-CM

## 2023-12-07 PROCEDURE — 95800 SLP STDY UNATTENDED: CPT

## 2023-12-11 DIAGNOSIS — G47.33 OSA (OBSTRUCTIVE SLEEP APNEA): Primary | ICD-10-CM

## 2023-12-11 DIAGNOSIS — I10 PRIMARY HYPERTENSION: Chronic | ICD-10-CM

## 2023-12-18 NOTE — PROGRESS NOTES
Called patient to advise of study results. Patient verbalized understanding and was agreeable to pap therapy. Faxed referral to [facility] 12/18/23 trc

## 2023-12-20 ENCOUNTER — OFFICE VISIT (OUTPATIENT)
Dept: FAMILY MEDICINE CLINIC | Facility: CLINIC | Age: 63
End: 2023-12-20
Payer: COMMERCIAL

## 2023-12-20 VITALS
WEIGHT: 230 LBS | OXYGEN SATURATION: 98 % | HEART RATE: 89 BPM | BODY MASS INDEX: 28.01 KG/M2 | HEIGHT: 76 IN | DIASTOLIC BLOOD PRESSURE: 74 MMHG | TEMPERATURE: 98.6 F | SYSTOLIC BLOOD PRESSURE: 124 MMHG

## 2023-12-20 DIAGNOSIS — M54.41 ACUTE MIDLINE LOW BACK PAIN WITH RIGHT-SIDED SCIATICA: Primary | ICD-10-CM

## 2023-12-20 DIAGNOSIS — M54.16 LUMBAR RADICULOPATHY: ICD-10-CM

## 2023-12-20 PROCEDURE — 99214 OFFICE O/P EST MOD 30 MIN: CPT | Performed by: PHYSICIAN ASSISTANT

## 2023-12-20 RX ORDER — BACLOFEN 10 MG/1
10 TABLET ORAL 3 TIMES DAILY PRN
Qty: 30 TABLET | Refills: 1 | Status: SHIPPED | OUTPATIENT
Start: 2023-12-20

## 2023-12-20 RX ORDER — MELOXICAM 7.5 MG/1
7.5 TABLET ORAL DAILY
Qty: 30 TABLET | Refills: 1 | Status: SHIPPED | OUTPATIENT
Start: 2023-12-20

## 2023-12-20 NOTE — PROGRESS NOTES
"     Follow Up Office Visit      Date: 2023   Patient Name: Randy Asif  : 1960   MRN: [id number]     Chief Complaint:    Chief Complaint   Patient presents with    Back Pain     Lower   X2 weeks    review medication      Toes fugus, statin        History of Present Illness: Randy Asif is a 63 y.o. male who is here today to follow up with some lower back pain.    His lower back pain has been going on for about 2 weeks. He tried to lift about 6 gallons of water. He gets a little tingly on his little right toe. The chiropractor has been doing some therapy on him and it helps a little bit. He denies any bladder or bowel issues. He just took off a lidocaine patch. He has pain when he coughs or sneezes. Pain has not responded to conservative therapy of chiropractic care, NSAIDs, muscles relaxers, heat and stretching. Pain radiates down right lower etremity with numbness and tingling. Difficulty walking, gait is affected.  He was taking ibuprofen 400 mg every 4 to 6 hours.    He went to the cardiologist and had a calcium score test. The \" maker\" was 50 percent blocked, but the rest of the heart was \"perfect.\" The cardiologist informed him that they could wait and place a stent later, but they should do a cholesterol medication. He experiences nocturia 2 to 3 times a night.     He is no longer taking atorvastatin because they were trying to decide if he was allergic to statin. He tried Crestor and Zetia in 2023, but they did not work. He started getting red again, so he stopped Zetia. He wants to know if there is any other medication other than statin.     He is no longer taking indapamide because his blood pressure is getting too low. He went to the urologist, who informed him that it could be his blood pressure medication. He discontinued indapamide and his blood pressure has been perfect. He checks his blood pressure at least once a week.      Subjective      Review of " "systems:  Review of Systems   Constitutional:  Negative for fatigue and fever.   HENT:  Negative for trouble swallowing.    Eyes:  Negative for visual disturbance.   Respiratory:  Negative for cough and shortness of breath.    Cardiovascular:  Negative for chest pain and leg swelling.   Gastrointestinal:  Negative for abdominal pain.        I have reviewed and the following portions of the patient's history were updated as appropriate: past family history, past medical history, past social history, past surgical history and problem list.    Medications:     Current Outpatient Medications:     carvedilol (COREG) 25 MG tablet, Take 1 tablet by mouth 2 (Two) Times a Day With Meals., Disp: 90 tablet, Rfl: 0    clobetasol (TEMOVATE) 0.05 % cream, Apply to affected areas in thin layer BID, Disp: 30 g, Rfl: 0    lamoTRIgine (LaMICtal) 200 MG tablet, Take 1 tablet by mouth 2 (Two) Times a Day., Disp: , Rfl:     sildenafil (REVATIO) 20 MG tablet, Take 5 tablets by mouth Daily As Needed (erectile dysfunction)., Disp: 120 tablet, Rfl: 3    baclofen (LIORESAL) 10 MG tablet, Take 1 tablet by mouth 3 (Three) Times a Day As Needed for Muscle Spasms., Disp: 30 tablet, Rfl: 1    meloxicam (Mobic) 7.5 MG tablet, Take 1 tablet by mouth Daily., Disp: 30 tablet, Rfl: 1    Allergies:   Allergies   Allergen Reactions    Lisinopril Cough    Oxycodone-Acetaminophen Anxiety and Unknown - Low Severity    Percocet [Oxycodone-Acetaminophen] Anxiety       Objective     Vital Signs:   Vitals:    12/20/23 0907   BP: 124/74   Pulse: 89   Temp: 98.6 °F (37 °C)   TempSrc: Infrared   SpO2: 98%   Weight: 104 kg (230 lb)   Height: 193 cm (76\")   PainSc:   8   PainLoc: Back     Body mass index is 28 kg/m².          Physical Exam:   Physical Exam  Vitals and nursing note reviewed.   Constitutional:       Appearance: Normal appearance.   HENT:      Head: Normocephalic and atraumatic.   Cardiovascular:      Rate and Rhythm: Normal rate and regular rhythm. "   Pulmonary:      Effort: Pulmonary effort is normal.      Breath sounds: Normal breath sounds. No decreased breath sounds, wheezing, rhonchi or rales.   Musculoskeletal:      Cervical back: Neck supple.      Lumbar back: Spasms and tenderness present. Decreased range of motion. Positive right straight leg raise test.      Right lower leg: No edema.      Left lower leg: No edema.   Lymphadenopathy:      Cervical: No cervical adenopathy.   Neurological:      Mental Status: He is alert.      Motor: No weakness.      Gait: Gait abnormal.          Procedures     Assessment / Plan      Assessment/Plan:   Diagnoses and all orders for this visit:    1. Acute midline low back pain with right-sided sciatica (Primary)  -     baclofen (LIORESAL) 10 MG tablet; Take 1 tablet by mouth 3 (Three) Times a Day As Needed for Muscle Spasms.  Dispense: 30 tablet; Refill: 1  -     meloxicam (Mobic) 7.5 MG tablet; Take 1 tablet by mouth Daily.  Dispense: 30 tablet; Refill: 1  -     MRI Lumbar Spine Without Contrast; Future    2. Lumbar radiculopathy  -     MRI Lumbar Spine Without Contrast; Future         1. Lower back pain.  He has had a few weeks of chiropractic care with no relief. Gait is significantly disturbed. I will try Mobic and baclofen as directed. Also, we will try to get MRI approved. I will await these results.    Follow Up:   No follow-ups on file.    Ana Loera PA-C   [facility]    Transcribed from ambient dictation for Ana Loera PA-C by Meg Kulkarni.  12/20/23   10:43 EST    Patient or patient representative verbalized consent to the visit recording.  I have personally performed the services described in this document as transcribed by the above individual, and it is both accurate and complete.

## 2024-01-09 DIAGNOSIS — I10 ESSENTIAL HYPERTENSION: ICD-10-CM

## 2024-01-09 RX ORDER — CARVEDILOL 25 MG/1
TABLET ORAL
Qty: 90 TABLET | Refills: 0 | Status: SHIPPED | OUTPATIENT
Start: 2024-01-09

## 2024-01-12 ENCOUNTER — TELEPHONE (OUTPATIENT)
Dept: FAMILY MEDICINE CLINIC | Facility: CLINIC | Age: 64
End: 2024-01-12
Payer: COMMERCIAL

## 2024-01-12 NOTE — TELEPHONE ENCOUNTER
[facility] CALLED AND SAID MRI LUMBAR SPINE HAS BEEN DENIED.  ALL INFORMATION IS IN MEDIA.  SARAHI ORDERED THIS.

## 2024-01-16 ENCOUNTER — TELEPHONE (OUTPATIENT)
Dept: FAMILY MEDICINE CLINIC | Facility: CLINIC | Age: 64
End: 2024-01-16
Payer: COMMERCIAL

## 2024-02-07 DIAGNOSIS — I10 ESSENTIAL HYPERTENSION: ICD-10-CM

## 2024-02-07 RX ORDER — CARVEDILOL 25 MG/1
TABLET ORAL
Qty: 90 TABLET | Refills: 0 | Status: SHIPPED | OUTPATIENT
Start: 2024-02-07

## 2024-03-10 DIAGNOSIS — I10 ESSENTIAL HYPERTENSION: ICD-10-CM

## 2024-03-11 RX ORDER — CARVEDILOL 25 MG/1
TABLET ORAL
Qty: 90 TABLET | Refills: 0 | Status: SHIPPED | OUTPATIENT
Start: 2024-03-11

## 2024-03-25 ENCOUNTER — OFFICE VISIT (OUTPATIENT)
Dept: CARDIOLOGY | Facility: CLINIC | Age: 64
End: 2024-03-25
Payer: COMMERCIAL

## 2024-03-25 VITALS
SYSTOLIC BLOOD PRESSURE: 130 MMHG | HEIGHT: 76 IN | DIASTOLIC BLOOD PRESSURE: 76 MMHG | HEART RATE: 64 BPM | WEIGHT: 240 LBS | BODY MASS INDEX: 29.22 KG/M2 | OXYGEN SATURATION: 95 %

## 2024-03-25 DIAGNOSIS — E78.2 MIXED HYPERLIPIDEMIA: Chronic | ICD-10-CM

## 2024-03-25 DIAGNOSIS — I49.3 PVC'S (PREMATURE VENTRICULAR CONTRACTIONS): Primary | Chronic | ICD-10-CM

## 2024-03-25 DIAGNOSIS — I10 PRIMARY HYPERTENSION: Chronic | ICD-10-CM

## 2024-03-25 PROCEDURE — 99214 OFFICE O/P EST MOD 30 MIN: CPT | Performed by: INTERNAL MEDICINE

## 2024-03-25 RX ORDER — PITAVASTATIN CALCIUM 2.09 MG/1
2 TABLET, FILM COATED ORAL NIGHTLY
Qty: 30 TABLET | Refills: 5 | Status: SHIPPED | OUTPATIENT
Start: 2024-03-25

## 2024-03-25 NOTE — PROGRESS NOTES
[facility]  Office visit  Randy Asif  1960  [phone]  [phone] (work)    VISIT DATE:  3/25/2024    PCP: Navya Mora APRN  [address] [address]    CC:  No chief complaint on file.      Previous cardiac studies and procedures:  May 2022 coronary calcium score  Left main: 243  Left anterior descending (LAD): 86  Left circumflex: 0  Right coronary artery (RCA): 35  1.  Calcium score (Agatston): 359.     October 2022 exercise treadmill test: Normal    ASSESSMENT:   Diagnosis Plan   1. PVC's (premature ventricular contractions)        2. Primary hypertension        3. Mixed hyperlipidemia            PLAN:  Coronary artery calcification: Elevated coronary calcium score, predominantly coming from the left main and ostial to proximal LAD.  Age and gender based percentile in the 75-90th percentile range.  Afterload well controlled.  Previous potential intolerance to rosuvastatin and atorvastatin due to skin rash/eczema.  Zetia previously ineffective.  Trial of low-dose pitavastatin before transitioning to alternative agents.    Hypertension: Goal less than 130/80 mmHg.  Currently well controlled.  Agree with current medical therapy.     PVCs: Not significant affecting quality of life.  Limited burden of arrhythmia.    Subjective  Interval assessment: Denies chest pain, palpitations or dyspnea.  Blood pressures running less than 130/80 mmHg.  Exercising on a regular basis without difficulty.    Initial evaluation: 62-year-old gentleman with a longstanding history of symptomatic PVCs, hypertension and dyslipidemia with recent elevated coronary calcium score.  Exercises on a regular basis without limitation.  Blood pressures running less than 130/80 mmHg.  Intermittent symptomatic PVCs which she has had for many years, improved with decreasing caffeine intake.  Not significantly affecting quality of life.    PHYSICAL  "EXAMINATION:  Vitals:    03/25/24 1302   BP: 130/76   BP Location: Right arm   Patient Position: Sitting   Pulse: 64   SpO2: 95%   Weight: 109 kg (240 lb)   Height: 193 cm (76\")     General Appearance:    Alert, cooperative, no distress, appears stated age   Head:    Normocephalic, without obvious abnormality, atraumatic   Eyes:    conjunctiva/corneas clear   Nose:   Nares normal, septum midline, mucosa normal, no drainage   Throat:   Lips, teeth and gums normal   Neck:   Supple, symmetrical, trachea midline, no carotid    bruit or JVD   Lungs:     Clear to auscultation bilaterally, respirations unlabored   Chest Wall:    No tenderness or deformity    Heart:    Regular rate and rhythm, S1 and S2 normal, no murmur, rub   or gallop, normal carotid impulse bilaterally without bruit.   Abdomen:     Soft, non-tender   Extremities:   Extremities normal, atraumatic, no cyanosis or edema   Pulses:   2+ and symmetric all extremities   Skin:   Skin color, texture, turgor normal, no rashes or lesions       Diagnostic Data:  Procedures  Lab Results   Component Value Date    CHLPL 207 (H) 03/12/2015    TRIG 137 07/27/2023    HDL 36 (L) 07/27/2023     Lab Results   Component Value Date    GLUCOSE 116 (H) 07/27/2023    BUN 15 07/27/2023    CREATININE 0.94 07/27/2023     07/27/2023    K 3.4 (L) 07/27/2023    CL 97 (L) 07/27/2023    CO2 30.1 (H) 07/27/2023     No results found for: \"HGBA1C\"  Lab Results   Component Value Date    WBC 6.39 04/08/2022    HGB 15.9 04/08/2022    HCT 46.6 04/08/2022     04/08/2022       Allergies  Allergies   Allergen Reactions    Lisinopril Cough    Oxycodone-Acetaminophen Anxiety and Unknown - Low Severity    Percocet [Oxycodone-Acetaminophen] Anxiety       Current Medications    Current Outpatient Medications:     carvedilol (COREG) 25 MG tablet, TAKE 1 TABLET BY MOUTH TWICE A DAY WITH A MEAL, Disp: 90 tablet, Rfl: 0    clobetasol (TEMOVATE) 0.05 % cream, Apply to affected areas in thin " layer BID, Disp: 30 g, Rfl: 0    lamoTRIgine (LaMICtal) 200 MG tablet, Take 1 tablet by mouth 2 (Two) Times a Day., Disp: , Rfl:     O2 (OXYGEN), Inhale 1 L/min Every Night., Disp: , Rfl:     sildenafil (REVATIO) 20 MG tablet, Take 5 tablets by mouth Daily As Needed (erectile dysfunction)., Disp: 120 tablet, Rfl: 3    baclofen (LIORESAL) 10 MG tablet, Take 1 tablet by mouth 3 (Three) Times a Day As Needed for Muscle Spasms., Disp: 30 tablet, Rfl: 1    meloxicam (Mobic) 7.5 MG tablet, Take 1 tablet by mouth Daily. (Patient not taking: Reported on 3/25/2024), Disp: 30 tablet, Rfl: 1          ROS  ROS      SOCIAL HX  Social History     Socioeconomic History    Marital status:    Tobacco Use    Smoking status: Never     Passive exposure: Never    Smokeless tobacco: Never   Vaping Use    Vaping status: Never Used   Substance and Sexual Activity    Alcohol use: Not Currently    Drug use: Never    Sexual activity: Yes     Partners: Female     Birth control/protection: None     Comment: Single        FAMILY HX  Family History   Problem Relation Age of Onset    No Known Problems Mother     Heart disease Father     Kidney disease Father     Heart attack Father     Hypertension Father     Thyroid disease Sister     No Known Problems Brother     No Known Problems Maternal Grandmother     No Known Problems Maternal Grandfather     No Known Problems Paternal Grandmother     No Known Problems Paternal Grandfather              Bernardo Rice III, MD, [facility]

## 2024-04-23 DIAGNOSIS — I10 ESSENTIAL HYPERTENSION: ICD-10-CM

## 2024-04-23 NOTE — PROGRESS NOTES
Sleep Clinic Video Visit Follow Up Note    The patient is located at their home address in [place], [place]. The patient presents today for telehealth service.  This service was conducted via audio/video technology through a secure [employer] video visit connection through Epic.  This provider is located in [facility].  Patient stated they are in a secure environment for the session.  Patient's condition being diagnosed/treated is appropriate for telemedicine.  The provider identified himself as well as his credentials.  The patient, and/or patient's guardian, consent to be seen remotely, and when consent is given they understanding that the consent allows for patient identifiable information to be sent to a third-party as needed.  They may refuse to be seen remotely at any time.  The electronic data is encrypted and password protected, and the patient and/or guardian has been advised of the potential risk to privacy not withstanding such measures.  Patient identifiers used: Name and date of birth.     You have chosen to receive care through a telehealth visit.  Do you consent to use a video connection for your medical care today? Yes       Chief Complaint  Follow-up and compliance of PAP therapy    Subjective     History of Present Illness (from previous encounter on 11/27/2023):  Randy Asif is a 63 y.o. male with a history of hypertension, hyperlipidemia, and asthmatic bronchitis.  The patient is referred by Navya MARTÍNEZ with primary care.  He has had worsening snoring. He has had difficulty with nocturia. He is taking naps during the day. (End copied text)    Interval History:  Randy Asif is a 64 y.o. male returns for follow up and compliance of PAP therapy. The patient was last seen on 11/27/2023 by me. Overall the patient feels good with regard to therapy. The device appears to be working appropriately. On average the patient sleeps 7-8 hours per night. The patient wake 3 times  per night.     The patient reports the following changes to their medical and medication history since they were last seen:  None        Further details are as follows:    Sidney Scale is: 6/24      Weight:  Current Weight: 240 lb    The patient's relevant past medical, surgical, family, and social history reviewed and updated in Epic as appropriate.    PMH:    Past Medical History:   Diagnosis Date    Abnormal finding on MRI of brain     Asthmatic bronchitis     Cancer Randy Laya    Elevated glucose     Hyperlipidemia 2/5/2022    Hypertension 2/5/2018    Shingles      Past Surgical History:   Procedure Laterality Date    BRAIN TUMOR EXCISION  1981    COLONOSCOPY      complete 2011  repeat 10 yrs    COLONOSCOPY  2022       Allergies   Allergen Reactions    Lisinopril Cough    Oxycodone-Acetaminophen Anxiety and Unknown - Low Severity    Percocet [Oxycodone-Acetaminophen] Anxiety       MEDS:  Prior to Admission medications    Medication Sig Start Date End Date Taking? Authorizing Provider   baclofen (LIORESAL) 10 MG tablet Take 1 tablet by mouth 3 (Three) Times a Day As Needed for Muscle Spasms. 12/20/23   Ana Loera PA-C   carvedilol (COREG) 25 MG tablet TAKE 1 TABLET BY MOUTH TWICE A DAY WITH A MEAL 3/11/24   Navya Mora APRN   clobetasol (TEMOVATE) 0.05 % cream Apply to affected areas in thin layer BID 9/13/22   Navya Mora APRN   lamoTRIgine (LaMICtal) 200 MG tablet Take 1 tablet by mouth 2 (Two) Times a Day.    ProviderLogan MD   meloxicam (Mobic) 7.5 MG tablet Take 1 tablet by mouth Daily.  Patient not taking: Reported on 3/25/2024 12/20/23   Ana Loera PA-C   O2 (OXYGEN) Inhale 1 L/min Every Night.    ProviderLogan MD   pitavastatin calcium (LIVALO) 2 MG tablet tablet Take 1 tablet by mouth Every Night. 3/25/24   Bernardo Rice III, MD   sildenafil (REVATIO) 20 MG tablet Take 5 tablets by mouth Daily As Needed (erectile dysfunction). 11/10/22   Navya Mora  "APRN         FH:  Family History   Problem Relation Age of Onset    No Known Problems Mother     Heart disease Father     Kidney disease Father     Heart attack Father     Hypertension Father     Thyroid disease Sister     No Known Problems Brother     No Known Problems Maternal Grandmother     No Known Problems Maternal Grandfather     No Known Problems Paternal Grandmother     No Known Problems Paternal Grandfather        Objective   Vital Signs:  Ht 193 cm (75.98\")   Wt 109 kg (240 lb)   BMI 29.23 kg/m²     Patient's (Body mass index is 29.23 kg/m².) indicates that they are overweight (BMI 25-29.9) with health related conditions that include obstructive sleep apnea . Weight is unchanged. BMI is is above average; BMI management plan is completed. We discussed portion control and increasing exercise.            Physical Exam  Constitutional:       Appearance: Normal appearance.   Neurological:      Mental Status: He is alert and oriented to person, place, and time.   Psychiatric:         Mood and Affect: Mood normal.         Behavior: Behavior normal.         Thought Content: Thought content normal.         Judgment: Judgment normal.               Result Review :           PAP Report:  AHI: 1.8/H  Days of Usage: 57/57 (100%)  95th Percentile Pressure: 11.5 cm H20  95th percentile leaks: 4.6 L/min  Number of Days Greater than 4 hours: 50/57 (88%)  Settings: Auto CPAP-5/20 cm H2O, EPR ramp only, EPR level 1, response standard.       Assessment and Plan  Randy Asif is a 64 y.o. male who returns for follow-up and compliance of PAP therapy.  The pap report has been reviewed.  Overall usage is at 100% with compliance of 88%.  Patient averages 6 hours and 51 minutes of therapy.  Sleep apnea is well-controlled with an AHI of 1.8/H. I will refill the patient's supplies, and they will return for follow-up and compliance in 1 year or sooner should they have further questions or concerns.      Diagnoses and all " orders for this visit:    1. BRIAN (obstructive sleep apnea) (Primary)  -     PAP Therapy    2. Overweight (BMI 25.0-29.9)           The patient continues to use and benefit from PAP therapy.    1. The patient was counseled regarding multimodal approach with healthy nutrition, healthy sleep, regular physical activity, social activities, counseling, and medications. Encouraged to practice lateral sleep position. Avoid alcohol and sedatives close to bedtime.     2.  We will refill supplies x1 year.  Return to clinic 1 year or sooner if symptoms warrant.  Patient gave verbal consent today for video visit. I have reviewed the results of my evaluation and impression and discussed my recommendations in detail with the patient.         Follow Up  Return in about 1 year (around 4/26/2025) for Annual visit, Video visit.  Patient was given instructions and counseling regarding his condition or for health maintenance advice. Please see specific information pulled into the AVS if appropriate.       TANYA Reyes, ACNP-BC  Pulmonology, Critical Care, and Sleep Medicine

## 2024-04-26 ENCOUNTER — TELEMEDICINE (OUTPATIENT)
Dept: SLEEP MEDICINE | Age: 64
End: 2024-04-26
Payer: COMMERCIAL

## 2024-04-26 VITALS — BODY MASS INDEX: 29.22 KG/M2 | HEIGHT: 76 IN | WEIGHT: 240 LBS

## 2024-04-26 DIAGNOSIS — E66.3 OVERWEIGHT (BMI 25.0-29.9): ICD-10-CM

## 2024-04-26 DIAGNOSIS — G47.33 OSA (OBSTRUCTIVE SLEEP APNEA): Primary | ICD-10-CM

## 2024-05-29 ENCOUNTER — OFFICE VISIT (OUTPATIENT)
Dept: FAMILY MEDICINE CLINIC | Facility: CLINIC | Age: 64
End: 2024-05-29
Payer: COMMERCIAL

## 2024-05-29 VITALS
DIASTOLIC BLOOD PRESSURE: 76 MMHG | HEIGHT: 76 IN | BODY MASS INDEX: 28.98 KG/M2 | HEART RATE: 64 BPM | OXYGEN SATURATION: 97 % | SYSTOLIC BLOOD PRESSURE: 125 MMHG | WEIGHT: 238 LBS | TEMPERATURE: 98.6 F

## 2024-05-29 DIAGNOSIS — N52.9 ERECTILE DYSFUNCTION, UNSPECIFIED ERECTILE DYSFUNCTION TYPE: ICD-10-CM

## 2024-05-29 DIAGNOSIS — Z12.5 SCREENING FOR PROSTATE CANCER: ICD-10-CM

## 2024-05-29 DIAGNOSIS — Z83.3 FAMILY HISTORY OF DIABETES MELLITUS: ICD-10-CM

## 2024-05-29 DIAGNOSIS — Z13.29 SCREENING FOR HYPOTHYROIDISM: ICD-10-CM

## 2024-05-29 DIAGNOSIS — E78.5 HYPERLIPIDEMIA, UNSPECIFIED HYPERLIPIDEMIA TYPE: ICD-10-CM

## 2024-05-29 DIAGNOSIS — I10 ESSENTIAL HYPERTENSION: Primary | ICD-10-CM

## 2024-05-29 DIAGNOSIS — B35.1 ONYCHOMYCOSIS: ICD-10-CM

## 2024-05-29 DIAGNOSIS — Z87.898 HISTORY OF SEIZURES: ICD-10-CM

## 2024-05-29 DIAGNOSIS — Z76.89 ENCOUNTER TO ESTABLISH CARE WITH NEW DOCTOR: ICD-10-CM

## 2024-05-29 PROCEDURE — 99214 OFFICE O/P EST MOD 30 MIN: CPT | Performed by: FAMILY MEDICINE

## 2024-05-29 RX ORDER — CARVEDILOL 25 MG/1
TABLET ORAL
Qty: 90 TABLET | Refills: 0 | OUTPATIENT
Start: 2024-05-29

## 2024-05-29 RX ORDER — CARVEDILOL 25 MG/1
25 TABLET ORAL 2 TIMES DAILY WITH MEALS
Qty: 90 TABLET | Refills: 1 | Status: SHIPPED | OUTPATIENT
Start: 2024-05-29

## 2024-05-29 RX ORDER — CICLOPIROX 80 MG/ML
SOLUTION TOPICAL NIGHTLY
Qty: 6 ML | Refills: 1 | Status: SHIPPED | OUTPATIENT
Start: 2024-05-29

## 2024-05-29 RX ORDER — SILDENAFIL CITRATE 20 MG/1
100 TABLET ORAL DAILY PRN
Qty: 120 TABLET | Refills: 1 | Status: SHIPPED | OUTPATIENT
Start: 2024-05-29

## 2024-05-29 NOTE — PATIENT INSTRUCTIONS
Take medications as ordered.  Low fat, low salt diet.  Exercise as tolerated.  [place] as ordered for toenails.

## 2024-05-29 NOTE — PROGRESS NOTES
Follow Up Office Visit      Date: 2024   Patient Name: Randy Asif  : 1960   MRN: [id number]     Chief Complaint:    Chief Complaint   Patient presents with    Establish Care     Patient states he thinks he has toenail fungus.     Med Refill     Patient needs refill on sildenafil       History of Present Illness: Randy Asif is a 64 y.o. male who presents today as a new patient to me.  Has been seeing TANYA Blair for PCP.    History includes hyperlipidemia, hypertension,      Thinks he has a toe fungus.    Prescribed Livalo for lipids.  Reports has not been taking the medication.  Prescribed by cardiology.  Takes Coreg for hypertension.  Sees Dr. Rice for cardiology.    Takes Sildenafil for ED.    Reports he eeds refill.          Subjective      Review of Systems:   Review of Systems   Constitutional:  Negative for chills and fever.   Gastrointestinal:  Negative for nausea and vomiting.   Neurological:  Positive for seizures (last seizure 9-10 years ago.). Negative for syncope.   Psychiatric/Behavioral:  Negative for suicidal ideas.        I have reviewed the patients family history, social history, past medical history, past surgical history and have updated it as appropriate.     Medications:     Current Outpatient Medications:     carvedilol (COREG) 25 MG tablet, Take 1 tablet by mouth 2 (Two) Times a Day With Meals., Disp: 90 tablet, Rfl: 1    clobetasol (TEMOVATE) 0.05 % cream, Apply to affected areas in thin layer BID, Disp: 30 g, Rfl: 0    lamoTRIgine (LaMICtal) 200 MG tablet, Take 1 tablet by mouth 2 (Two) Times a Day., Disp: , Rfl:     sildenafil (REVATIO) 20 MG tablet, Take 5 tablets by mouth Daily As Needed (erectile dysfunction)., Disp: 120 tablet, Rfl: 1    ciclopirox (PENLAC) 8 % solution, Apply  topically to the appropriate area as directed Every Night., Disp: 6 mL, Rfl: 1    Allergies:   Allergies   Allergen Reactions    Lisinopril Cough     "Oxycodone-Acetaminophen Anxiety and Unknown - Low Severity    Percocet [Oxycodone-Acetaminophen] Anxiety       Objective     Physical Exam: Please see above  Vital Signs:   Vitals:    05/29/24 1346   BP: 125/76   Pulse: 64   Temp: 98.6 °F (37 °C)   TempSrc: Infrared   SpO2: 97%   Weight: 108 kg (238 lb)   Height: 193 cm (75.98\")     Body mass index is 28.98 kg/m².          Physical Exam  Vitals and nursing note reviewed.   Constitutional:       Appearance: Normal appearance.   HENT:      Head: Normocephalic and atraumatic.   Neck:      Vascular: No carotid bruit.   Cardiovascular:      Rate and Rhythm: Normal rate and regular rhythm.      Heart sounds: Normal heart sounds. No murmur heard.  Pulmonary:      Effort: Pulmonary effort is normal.      Breath sounds: Normal breath sounds.   Abdominal:      General: Bowel sounds are normal.      Palpations: Abdomen is soft. There is no mass.      Tenderness: There is no abdominal tenderness.   Musculoskeletal:      Right lower leg: No edema.      Left lower leg: No edema.   Skin:     Coloration: Skin is not jaundiced or pale.      Findings: No erythema.      Comments: Multiple thickened dystrophic toenails.   Neurological:      Mental Status: He is alert. Mental status is at baseline.   Psychiatric:         Mood and Affect: Mood normal.         Behavior: Behavior normal.         Procedures    Results:   Labs:   TSH   Date Value Ref Range Status   04/16/2021 0.841 0.270 - 4.200 uIU/mL Final        POCT Results (if applicable):   Results for orders placed or performed in visit on 07/27/23   Comprehensive Metabolic Panel    Specimen: Blood   Result Value Ref Range    Glucose 116 (H) 65 - 99 mg/dL    BUN 15 8 - 23 mg/dL    Creatinine 0.94 0.76 - 1.27 mg/dL    Sodium 139 136 - 145 mmol/L    Potassium 3.4 (L) 3.5 - 5.2 mmol/L    Chloride 97 (L) 98 - 107 mmol/L    CO2 30.1 (H) 22.0 - 29.0 mmol/L    Calcium 9.9 8.6 - 10.5 mg/dL    Total Protein 6.9 6.0 - 8.5 g/dL    Albumin 4.5 " 3.5 - 5.2 g/dL    ALT (SGPT) 44 (H) 1 - 41 U/L    AST (SGOT) 32 1 - 40 U/L    Alkaline Phosphatase 68 39 - 117 U/L    Total Bilirubin 0.6 0.0 - 1.2 mg/dL    Globulin 2.4 gm/dL    A/G Ratio 1.9 g/dL    BUN/Creatinine Ratio 16.0 7.0 - 25.0    Anion Gap 11.9 5.0 - 15.0 mmol/L    eGFR 91.7 >60.0 mL/min/1.73   Lipid Panel    Specimen: Blood   Result Value Ref Range    Total Cholesterol 216 (H) 0 - 200 mg/dL    Triglycerides 137 0 - 150 mg/dL    HDL Cholesterol 36 (L) 40 - 60 mg/dL    LDL Cholesterol  155 (H) 0 - 100 mg/dL    VLDL Cholesterol 25 5 - 40 mg/dL    LDL/HDL Ratio 4.24        Imaging:   No valid procedures specified.     Measures:   Advanced Care Planning:   Patient does not have an advance directive, declines further assistance.    Smoking Cessation:   Does not smoke      Assessment / Plan      Assessment/Plan:     1. Essential hypertension  Discussed importance of following low-salt diet for blood pressure control.  Check CMP.  Continue Coreg.  Patient to continue to follow with cardiology.  - Comprehensive Metabolic Panel; Future  - carvedilol (COREG) 25 MG tablet; Take 1 tablet by mouth 2 (Two) Times a Day With Meals.  Dispense: 90 tablet; Refill: 1    2. Hyperlipidemia, unspecified hyperlipidemia type  Check fasting lipid panel.  Patient not taking statin.  Concerned about side effects.  Most recently prescribed Livalo.  Had been prescribed Lipitor, Zetia in the past.  - Lipid Panel; Future    3. Erectile dysfunction, unspecified erectile dysfunction type  Continue sildenafil as patient reports medication helps.  Discussed it being a medical emergency if he has an erection that last over 4 hours, and discussed notifying provider if he has been seen for chest pain when he has been taking this medication.    - sildenafil (REVATIO) 20 MG tablet; Take 5 tablets by mouth Daily As Needed (erectile dysfunction).  Dispense: 120 tablet; Refill: 1    4. Onychomycosis  Penlac course.    - ciclopirox (PENLAC) 8 %  solution; Apply  topically to the appropriate area as directed Every Night.  Dispense: 6 mL; Refill: 1    5. History of seizures  Will refer to neurology.    - Ambulatory Referral to Neurology    6. Screening for prostate cancer  Check PSA.  - PSA Screen; Future    7. Family history of diabetes mellitus  Check A1c.  - Hemoglobin A1c; Future    8. Screening for hypothyroidism  Check TSH.  - TSH; Future    9. Encounter to establish care with new doctor          Part of this note may be an electronic transcription/translation of spoken language to printed text using the Dragon Dictation System.        Vaccine Counseling:      Follow Up:   Return in about 3 months (around 8/29/2024).      DO CHEO Cabrera

## 2024-05-31 ENCOUNTER — LAB (OUTPATIENT)
Dept: LAB | Facility: HOSPITAL | Age: 64
End: 2024-05-31
Payer: COMMERCIAL

## 2024-05-31 DIAGNOSIS — Z12.5 SCREENING FOR PROSTATE CANCER: ICD-10-CM

## 2024-05-31 DIAGNOSIS — Z83.3 FAMILY HISTORY OF DIABETES MELLITUS: ICD-10-CM

## 2024-05-31 DIAGNOSIS — E78.5 HYPERLIPIDEMIA, UNSPECIFIED HYPERLIPIDEMIA TYPE: ICD-10-CM

## 2024-05-31 DIAGNOSIS — I10 ESSENTIAL HYPERTENSION: ICD-10-CM

## 2024-05-31 DIAGNOSIS — Z13.29 SCREENING FOR HYPOTHYROIDISM: ICD-10-CM

## 2024-05-31 LAB
ALBUMIN SERPL-MCNC: 4.6 G/DL (ref 3.5–5.2)
ALBUMIN/GLOB SERPL: 2 G/DL
ALP SERPL-CCNC: 68 U/L (ref 39–117)
ALT SERPL W P-5'-P-CCNC: 41 U/L (ref 1–41)
ANION GAP SERPL CALCULATED.3IONS-SCNC: 8 MMOL/L (ref 5–15)
AST SERPL-CCNC: 23 U/L (ref 1–40)
BILIRUB SERPL-MCNC: 0.4 MG/DL (ref 0–1.2)
BUN SERPL-MCNC: 12 MG/DL (ref 8–23)
BUN/CREAT SERPL: 14.5 (ref 7–25)
CALCIUM SPEC-SCNC: 9.3 MG/DL (ref 8.6–10.5)
CHLORIDE SERPL-SCNC: 103 MMOL/L (ref 98–107)
CHOLEST SERPL-MCNC: 220 MG/DL (ref 0–200)
CO2 SERPL-SCNC: 28 MMOL/L (ref 22–29)
CREAT SERPL-MCNC: 0.83 MG/DL (ref 0.76–1.27)
EGFRCR SERPLBLD CKD-EPI 2021: 98.3 ML/MIN/1.73
GLOBULIN UR ELPH-MCNC: 2.3 GM/DL
GLUCOSE SERPL-MCNC: 117 MG/DL (ref 65–99)
HBA1C MFR BLD: 5.8 % (ref 4.8–5.6)
HDLC SERPL-MCNC: 37 MG/DL (ref 40–60)
LDLC SERPL CALC-MCNC: 156 MG/DL (ref 0–100)
LDLC/HDLC SERPL: 4.14 {RATIO}
POTASSIUM SERPL-SCNC: 4.6 MMOL/L (ref 3.5–5.2)
PROT SERPL-MCNC: 6.9 G/DL (ref 6–8.5)
PSA SERPL-MCNC: 0.78 NG/ML (ref 0–4)
SODIUM SERPL-SCNC: 139 MMOL/L (ref 136–145)
TRIGL SERPL-MCNC: 150 MG/DL (ref 0–150)
TSH SERPL DL<=0.05 MIU/L-ACNC: 0.75 UIU/ML (ref 0.27–4.2)
VLDLC SERPL-MCNC: 27 MG/DL (ref 5–40)

## 2024-05-31 PROCEDURE — 80053 COMPREHEN METABOLIC PANEL: CPT

## 2024-05-31 PROCEDURE — 80061 LIPID PANEL: CPT

## 2024-05-31 PROCEDURE — 83036 HEMOGLOBIN GLYCOSYLATED A1C: CPT

## 2024-05-31 PROCEDURE — 84443 ASSAY THYROID STIM HORMONE: CPT

## 2024-05-31 PROCEDURE — G0103 PSA SCREENING: HCPCS

## 2024-08-22 DIAGNOSIS — I10 ESSENTIAL HYPERTENSION: ICD-10-CM

## 2024-08-23 RX ORDER — CARVEDILOL 25 MG/1
25 TABLET ORAL 2 TIMES DAILY WITH MEALS
Qty: 90 TABLET | Refills: 1 | Status: SHIPPED | OUTPATIENT
Start: 2024-08-23

## 2024-08-30 ENCOUNTER — OFFICE VISIT (OUTPATIENT)
Dept: FAMILY MEDICINE CLINIC | Facility: CLINIC | Age: 64
End: 2024-08-30
Payer: COMMERCIAL

## 2024-08-30 VITALS
BODY MASS INDEX: 28.74 KG/M2 | SYSTOLIC BLOOD PRESSURE: 120 MMHG | TEMPERATURE: 98.6 F | HEART RATE: 58 BPM | OXYGEN SATURATION: 97 % | DIASTOLIC BLOOD PRESSURE: 68 MMHG | WEIGHT: 236 LBS | HEIGHT: 76 IN

## 2024-08-30 DIAGNOSIS — E78.5 HYPERLIPIDEMIA, UNSPECIFIED HYPERLIPIDEMIA TYPE: Primary | ICD-10-CM

## 2024-08-30 DIAGNOSIS — I10 ESSENTIAL HYPERTENSION: ICD-10-CM

## 2024-08-30 DIAGNOSIS — N52.9 ERECTILE DYSFUNCTION, UNSPECIFIED ERECTILE DYSFUNCTION TYPE: ICD-10-CM

## 2024-08-30 PROCEDURE — 99214 OFFICE O/P EST MOD 30 MIN: CPT | Performed by: FAMILY MEDICINE

## 2024-08-30 RX ORDER — CARVEDILOL 12.5 MG/1
12.5 TABLET ORAL 2 TIMES DAILY WITH MEALS
Qty: 180 TABLET | Refills: 0 | Status: SHIPPED | OUTPATIENT
Start: 2024-08-30

## 2024-08-30 NOTE — PATIENT INSTRUCTIONS
Decrease Carvedilol to 12.5 mg twice daily.  Take medications as ordered.  Low fat, low salt diet.  Exercise as tolerated.

## 2024-08-30 NOTE — PROGRESS NOTES
Follow Up Office Visit      Date: 2024   Patient Name: Randy Asif  : 1960   MRN: [id number]     Chief Complaint:    Chief Complaint   Patient presents with    Hypertension       History of Present Illness: Randy Asif is a 64 y.o. male who presents today for follow-up for hyperlipidemia, hypertension.    Prescribe Livalo for lipids.  Had reported not taking the medication.  Prescribed by cardiology.    Takes Coreg for hypertension.    Sees Dr. Rice for cardiology.    Takes sildenafil for ED    Cholesterol was 220,  on 2024.    Reports [employer] genetic kit and had blood test as well.  Reports testing gives recommended cholesterol meds.  Plans on doing the testing.  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              Subjective      Review of Systems:   Review of Systems   Eyes:  Negative for blurred vision.   Respiratory:  Negative for shortness of breath.    Cardiovascular:  Negative for chest pain and palpitations.       I have reviewed the patients family history, social history, past medical history, past surgical history and have updated it as appropriate.     Medications:     Current Outpatient Medications:     carvedilol (COREG) 12.5 MG tablet, Take 1 tablet by mouth 2 (Two) Times a Day With Meals., Disp: 180 tablet, Rfl: 0    ciclopirox (PENLAC) 8 % solution, Apply  topically to the appropriate area as directed Every Night., Disp: 6 mL, Rfl: 1    clobetasol (TEMOVATE) 0.05 % cream, Apply to affected areas in thin layer BID, Disp: 30 g, Rfl: 0    lamoTRIgine (LaMICtal) 200 MG tablet, Take 1 tablet by mouth 2 (Two) Times a Day., Disp: , Rfl:     sildenafil (REVATIO) 20 MG tablet, Take 5 tablets by mouth Daily As Needed (erectile dysfunction)., Disp: 120 tablet, Rfl: 1    Allergies:   Allergies   Allergen Reactions    Lisinopril Cough    Latex Other (See Comments)    Oxycodone-Acetaminophen Anxiety and Unknown - Low Severity    Percocet [Oxycodone-Acetaminophen]  "Anxiety       Objective     Physical Exam: Please see above  Vital Signs:   Vitals:    08/30/24 0757   BP: 120/68   Pulse: 58   Temp: 98.6 °F (37 °C)   TempSrc: Temporal   SpO2: 97%   Weight: 107 kg (236 lb)   Height: 193 cm (75.98\")   PainSc: 0-No pain     Body mass index is 28.74 kg/m².          Physical Exam  Vitals and nursing note reviewed.   Constitutional:       Appearance: Normal appearance.   HENT:      Head: Normocephalic and atraumatic.   Neck:      Vascular: No carotid bruit.   Cardiovascular:      Rate and Rhythm: Normal rate and regular rhythm.      Heart sounds: Normal heart sounds. No murmur heard.  Pulmonary:      Effort: Pulmonary effort is normal.      Breath sounds: Normal breath sounds.   Abdominal:      General: Bowel sounds are normal.      Palpations: Abdomen is soft. There is no mass.      Tenderness: There is no abdominal tenderness.   Musculoskeletal:      Right lower leg: No edema.      Left lower leg: No edema.   Skin:     Coloration: Skin is not jaundiced or pale.      Findings: No erythema.   Neurological:      Mental Status: He is alert. Mental status is at baseline.   Psychiatric:         Mood and Affect: Mood normal.         Behavior: Behavior normal.         Procedures    Results:   Labs:   Hemoglobin A1C   Date Value Ref Range Status   05/31/2024 5.80 (H) 4.80 - 5.60 % Final     TSH   Date Value Ref Range Status   05/31/2024 0.751 0.270 - 4.200 uIU/mL Final        POCT Results (if applicable):   Results for orders placed or performed in visit on 05/31/24   Comprehensive Metabolic Panel    Specimen: Blood   Result Value Ref Range    Glucose 117 (H) 65 - 99 mg/dL    BUN 12 8 - 23 mg/dL    Creatinine 0.83 0.76 - 1.27 mg/dL    Sodium 139 136 - 145 mmol/L    Potassium 4.6 3.5 - 5.2 mmol/L    Chloride 103 98 - 107 mmol/L    CO2 28.0 22.0 - 29.0 mmol/L    Calcium 9.3 8.6 - 10.5 mg/dL    Total Protein 6.9 6.0 - 8.5 g/dL    Albumin 4.6 3.5 - 5.2 g/dL    ALT (SGPT) 41 1 - 41 U/L    AST " (SGOT) 23 1 - 40 U/L    Alkaline Phosphatase 68 39 - 117 U/L    Total Bilirubin 0.4 0.0 - 1.2 mg/dL    Globulin 2.3 gm/dL    A/G Ratio 2.0 g/dL    BUN/Creatinine Ratio 14.5 7.0 - 25.0    Anion Gap 8.0 5.0 - 15.0 mmol/L    eGFR 98.3 >60.0 mL/min/1.73   Lipid Panel    Specimen: Blood   Result Value Ref Range    Total Cholesterol 220 (H) 0 - 200 mg/dL    Triglycerides 150 0 - 150 mg/dL    HDL Cholesterol 37 (L) 40 - 60 mg/dL    LDL Cholesterol  156 (H) 0 - 100 mg/dL    VLDL Cholesterol 27 5 - 40 mg/dL    LDL/HDL Ratio 4.14    PSA Screen    Specimen: Blood   Result Value Ref Range    PSA 0.776 0.000 - 4.000 ng/mL   TSH    Specimen: Blood   Result Value Ref Range    TSH 0.751 0.270 - 4.200 uIU/mL   Hemoglobin A1c    Specimen: Blood   Result Value Ref Range    Hemoglobin A1C 5.80 (H) 4.80 - 5.60 %       Imaging:   No valid procedures specified.     Measures:   Advanced Care Planning:   Patient has an advance directive (not in EMR), copy requested.    Smoking Cessation:   Does not smoke      Assessment / Plan      Assessment/Plan:     1. Essential hypertension  Discussed importance of following low-salt diet for blood pressure control.  This is a chronic problem that has been well-controlled on current regimen.  Possibly blood pressure running on the low side at times.  Will decrease carvedilol to 12.5 mg twice daily.  Short follow-up in about a week to reevaluate blood pressure    - carvedilol (COREG) 12.5 MG tablet; Take 1 tablet by mouth 2 (Two) Times a Day With Meals.  Dispense: 180 tablet; Refill: 0    2. Hyperlipidemia, unspecified hyperlipidemia type  Encourage patient to follow low-fat diet.  Also discussed exercising as tolerated.  Patient to have blood testing to check about recommended cholesterol medication.      3. Erectile dysfunction, unspecified erectile dysfunction type  Continue sildenafil, as patient reports medication helps      Part of this note may be an electronic transcription/translation of spoken  language to printed text using the Dragon Dictation System.    Short follow-up in about a week to reevaluate blood pressure after change in medication dose.      Vaccine Counseling:      Follow Up:   Return in about 1 week (around 9/6/2024).      DO CHEO Cabrera

## 2024-09-06 ENCOUNTER — OFFICE VISIT (OUTPATIENT)
Dept: NEUROLOGY | Facility: CLINIC | Age: 64
End: 2024-09-06
Payer: COMMERCIAL

## 2024-09-06 VITALS
WEIGHT: 235.89 LBS | HEART RATE: 60 BPM | HEIGHT: 76 IN | DIASTOLIC BLOOD PRESSURE: 84 MMHG | OXYGEN SATURATION: 95 % | SYSTOLIC BLOOD PRESSURE: 138 MMHG | BODY MASS INDEX: 28.73 KG/M2

## 2024-09-06 DIAGNOSIS — G40.209 COMPLEX PARTIAL EPILEPSY: Primary | Chronic | ICD-10-CM

## 2024-09-06 PROCEDURE — 99244 OFF/OP CNSLTJ NEW/EST MOD 40: CPT | Performed by: PSYCHIATRY & NEUROLOGY

## 2024-09-06 RX ORDER — DIPHENOXYLATE HYDROCHLORIDE AND ATROPINE SULFATE 2.5; .025 MG/1; MG/1
TABLET ORAL DAILY
COMMUNITY

## 2024-09-06 RX ORDER — LAMOTRIGINE 200 MG/1
200 TABLET ORAL 2 TIMES DAILY
Qty: 180 TABLET | Refills: 3 | Status: SHIPPED | OUTPATIENT
Start: 2024-09-06

## 2024-09-06 NOTE — PROGRESS NOTES
Subjective   Patient ID: Randy Asif is a 64 y.o. male     Chief Complaint   Patient presents with    hx of sz        History of Present Illness    64 y.o. male referred by Dr Joe Reese for seizures.    MRI Brain, my review of films, 12/7/2015 s/p craniotomy L parietal with mild T2 signal changes.    Last sz 5 years ago.  Reports dizziness on LTG.      Stopped ETOH two years ago.        Reviewed medical records:    Previously followed by Dr Carrera for GTC sz.      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  3 GTC sz after surgery and on PHT for 4 years.  Off AED for 35 years then sz 7/31/15.    Aura: aphasia, right sided numbness, visual sx.  Sz d/o last episode 10 yrs ago.      AED:  LTG, OXC, PHT, LVT    Past Medical History:   Diagnosis Date    Abnormal finding on MRI of brain     Asthmatic bronchitis     Cancer Randy Asif    Elevated glucose     Hyperlipidemia 2/5/2022    Hypertension 2/5/2018    Seizures     Shingles     Sleep apnea 2022    On a c-pap machine now     Family History   Problem Relation Age of Onset    No Known Problems Mother     Heart disease Father     Kidney disease Father     Heart attack Father     Hypertension Father     Thyroid disease Sister     No Known Problems Brother     No Known Problems Maternal Grandmother     No Known Problems Maternal Grandfather     No Known Problems Paternal Grandmother     No Known Problems Paternal Grandfather      Social History     Socioeconomic History    Marital status:    Tobacco Use    Smoking status: Never     Passive exposure: Never    Smokeless tobacco: Never   Vaping Use    Vaping status: Never Used   Substance and Sexual Activity    Alcohol use: Not Currently    Drug use: Never    Sexual activity: Yes     Partners: Female     Birth control/protection: None     Comment: Single        Review of Systems   Constitutional:  Negative for activity change, fatigue and unexpected weight change.   HENT:  Negative  "for facial swelling, hearing loss, tinnitus, trouble swallowing and voice change.    Eyes:  Negative for photophobia, pain and visual disturbance.   Respiratory:  Negative for apnea, cough and choking.    Cardiovascular:  Negative for chest pain.   Gastrointestinal:  Negative for constipation, nausea and vomiting.   Endocrine: Negative for cold intolerance.   Genitourinary:  Negative for difficulty urinating, frequency and urgency.   Musculoskeletal:  Negative for arthralgias, back pain, gait problem, myalgias, neck pain and neck stiffness.   Skin:  Negative for rash.   Allergic/Immunologic: Negative for immunocompromised state.   Neurological:  Positive for seizures. Negative for dizziness, tremors, syncope, facial asymmetry, speech difficulty, weakness, light-headedness, numbness and headaches.   Hematological:  Negative for adenopathy.   Psychiatric/Behavioral:  Negative for confusion, decreased concentration, hallucinations and sleep disturbance. The patient is not nervous/anxious.        Objective     Vitals:    09/06/24 1456   BP: 138/84   Pulse: 60   SpO2: 95%   Weight: 107 kg (235 lb 14.3 oz)   Height: 193 cm (75.98\")       Neurologic Exam     Mental Status   Oriented to person, place, and time.   Speech: speech is normal   Level of consciousness: alert  Knowledge: good and consistent with education.   Normal comprehension.     Cranial Nerves   Cranial nerves II through XII intact.     CN II   Visual fields full to confrontation.   Visual acuity: normal  Right visual field deficit: none  Left visual field deficit: none     CN III, IV, VI   Pupils are equal, round, and reactive to light.  Extraocular motions are normal.   Nystagmus: none   Diplopia: none  Ophthalmoparesis: none  Upgaze: normal  Downgaze: normal  Conjugate gaze: present    CN V   Facial sensation intact.   Right corneal reflex: normal  Left corneal reflex: normal    CN VII   Right facial weakness: none  Left facial weakness: none    CN VIII "   Hearing: intact    CN IX, X   Palate: symmetric  Right gag reflex: normal  Left gag reflex: normal    CN XI   Right sternocleidomastoid strength: normal  Left sternocleidomastoid strength: normal    CN XII   Tongue: not atrophic  Fasciculations: absent  Tongue deviation: none    Motor Exam   Muscle bulk: normal  Overall muscle tone: normal    Strength   Strength 5/5 throughout.     Sensory Exam   Light touch normal.     Gait, Coordination, and Reflexes     Gait  Gait: normal    Tremor   Resting tremor: absent  Intention tremor: absent  Action tremor: absent    Reflexes   Reflexes 2+ except as noted.        Physical Exam  Eyes:      Extraocular Movements: EOM normal.      Pupils: Pupils are equal, round, and reactive to light.   Neurological:      Mental Status: He is oriented to person, place, and time.      Cranial Nerves: Cranial nerves 2-12 are intact.      Motor: Motor strength is normal.     Gait: Gait is intact.   Psychiatric:         Speech: Speech normal.         Lab on 05/31/2024   Component Date Value Ref Range Status    Glucose 05/31/2024 117 (H)  65 - 99 mg/dL Final    BUN 05/31/2024 12  8 - 23 mg/dL Final    Creatinine 05/31/2024 0.83  0.76 - 1.27 mg/dL Final    Sodium 05/31/2024 139  136 - 145 mmol/L Final    Potassium 05/31/2024 4.6  3.5 - 5.2 mmol/L Final    Chloride 05/31/2024 103  98 - 107 mmol/L Final    CO2 05/31/2024 28.0  22.0 - 29.0 mmol/L Final    Calcium 05/31/2024 9.3  8.6 - 10.5 mg/dL Final    Total Protein 05/31/2024 6.9  6.0 - 8.5 g/dL Final    Albumin 05/31/2024 4.6  3.5 - 5.2 g/dL Final    ALT (SGPT) 05/31/2024 41  1 - 41 U/L Final    AST (SGOT) 05/31/2024 23  1 - 40 U/L Final    Alkaline Phosphatase 05/31/2024 68  39 - 117 U/L Final    Total Bilirubin 05/31/2024 0.4  0.0 - 1.2 mg/dL Final    Globulin 05/31/2024 2.3  gm/dL Final    A/G Ratio 05/31/2024 2.0  g/dL Final    BUN/Creatinine Ratio 05/31/2024 14.5  7.0 - 25.0 Final    Anion Gap 05/31/2024 8.0  5.0 - 15.0 mmol/L Final     eGFR 05/31/2024 98.3  >60.0 mL/min/1.73 Final    Total Cholesterol 05/31/2024 220 (H)  0 - 200 mg/dL Final    Triglycerides 05/31/2024 150  0 - 150 mg/dL Final    HDL Cholesterol 05/31/2024 37 (L)  40 - 60 mg/dL Final    LDL Cholesterol  05/31/2024 156 (H)  0 - 100 mg/dL Final    VLDL Cholesterol 05/31/2024 27  5 - 40 mg/dL Final    LDL/HDL Ratio 05/31/2024 4.14   Final    PSA 05/31/2024 0.776  0.000 - 4.000 ng/mL Final    TSH 05/31/2024 0.751  0.270 - 4.200 uIU/mL Final    Hemoglobin A1C 05/31/2024 5.80 (H)  4.80 - 5.60 % Final         Assessment & Plan     Problem List Items Addressed This Visit          Neuro    Complex partial epilepsy - Primary (Chronic)    Current Assessment & Plan     Stable on LTG                No follow-ups on file.

## 2024-09-25 ENCOUNTER — TELEPHONE (OUTPATIENT)
Dept: FAMILY MEDICINE CLINIC | Facility: CLINIC | Age: 64
End: 2024-09-25
Payer: COMMERCIAL

## 2024-10-02 ENCOUNTER — OFFICE VISIT (OUTPATIENT)
Dept: FAMILY MEDICINE CLINIC | Facility: CLINIC | Age: 64
End: 2024-10-02
Payer: COMMERCIAL

## 2024-10-02 VITALS
HEIGHT: 76 IN | WEIGHT: 229.4 LBS | OXYGEN SATURATION: 98 % | HEART RATE: 90 BPM | SYSTOLIC BLOOD PRESSURE: 116 MMHG | BODY MASS INDEX: 27.93 KG/M2 | DIASTOLIC BLOOD PRESSURE: 72 MMHG | TEMPERATURE: 97.4 F

## 2024-10-02 DIAGNOSIS — R56.9 SEIZURE: ICD-10-CM

## 2024-10-02 DIAGNOSIS — Z91.148 NONCOMPLIANCE WITH MEDICATION REGIMEN: ICD-10-CM

## 2024-10-02 DIAGNOSIS — Z09 HOSPITAL DISCHARGE FOLLOW-UP: Primary | ICD-10-CM

## 2024-10-02 RX ORDER — LEVETIRACETAM 1000 MG/1
750 TABLET ORAL 2 TIMES DAILY
COMMUNITY
Start: 2024-09-27 | End: 2024-10-04 | Stop reason: SDUPTHER

## 2024-10-02 NOTE — PATIENT INSTRUCTIONS
Take medications as ordered.  Low fat, low salt diet.  Exercise as tolerated.   Keep appt with Neurology tomorrow.

## 2024-10-02 NOTE — PROGRESS NOTES
Transitional Care Follow Up Visit  Subjective     Randy Asif is a 64 y.o. male who presents for a transitional care management visit.    Within 48 business hours after discharge our office contacted him via telephone to coordinate his care and needs.      I reviewed and discussed the details of that call along with the discharge summary, hospital problems, inpatient lab results, inpatient diagnostic studies, and consultation reports with Randy.     Current outpatient and discharge medications have been reconciled for the patient.  Reviewed by: Joe Pearce DO           No data to display              Risk for Readmission (LACE) No data recorded    History of Present Illness   Course During Hospital Stay:  Was admitted at      Reports had missed a few doses of his Lamictal and has seizure.    Was started on Keppra 1000 mg.  Reports he has started taking  dose.      Admit Date/Time: 9/25/2024  2:36 PM  Admitting Attending: Teri Catalan  Discharge Date: 9/27/24  Discharge Attending Physician: Harish Marcial MD   PCP name and Address:  Pcp, Ciara [address] [place] [address]  Referring provider name and address:   No referring provider defined for this encounter.     Chief Concern, Brief History of Present Illness, and Hospital Course  Randy Asif is a 64 year old male with PMHx of HTN, epilepsy on lamotrigine, and cerebral neoplasm s/p resection in his 20s presenting due to nonsensical speech, right-sided weakness, and right foot dragging.      Stroke workup for symptoms was found to be negative. EEG confirmed patient was experiencing seizure on initial presentation. MRI confirmed ictal changes consistent with these symptoms. Etiology of breakthrough seizure most likely poor medication adherence. Patient reports that he has missed doses in the recent few weeks. Pt was placed on IV Keppra. Spoke with our pharmacist and agreed slow titration up to home dose of Lamotrigine 200mg PO BID. Clinical  presentation of nonsensical speech, right sided weakness, and right foot dragging resolved 9/27     #Two electrographic seizures in left centro-temporal-parietal region, left LPDs, left centro-parietal sharps   #History of epilepsy after cerebral neoplasm s/p craniotomy and resection in his 20s POA  #Noncompliance with medications POA  #Suspected acute ischemic stroke - Aphasia, right facial droop, right hemiparesis, left ptosis POA, resolved  #left parietal calcification - likely post surgical effects POA  - Mechanism of presentation acute stroke vs seizure   - CT demonstrated no ICH  - EEG demonstrated two episodes of seizure  - History of underlying seizure disorder  - per friend, patient's last seizure was about 2 years ago, typical seizure is speech arrest   - MRI head revealed swelling and diffusion restriction involving the left hippocampus and amygdala suggestive of ictal changes   - Given Keppra load 1500mg BID  - Continue home Lamotrigine 200mg BID, Keppra 1000mg      #Hypertensive emergency POA  #History of HTN POA  - BP: 221/124 on admission  - Goal BP NL   - defer restarting home meds as pt is normotensive on discharge     Surgeries and Procedures      Procedures performed in this encounter   Procedures    Critical Care    Critical Care    POC Rapid EEG ([facility])                Medication List          .       carvedilol 25 MG tablet  Commonly known as: Coreg  Take 1 tablet (25 mg) by mouth 2 (two) times a day with meals.      lamoTRIgine 200 MG tablet  Commonly known as: LaMICtal  Take 1 tablet (200 mg) by mouth 2 (two) times a day.                   Discharge Diagnosis  Medical Problems         Active and Resolved Hospital Problems                  Hospital     Seizure-like activity (CMS/HCC)     Overview Addendum 9/26/2024  8:43 AM by Molly Gifford APRN       History of epilepsy  Continue home meds   Started on maintenance keppra 1500mg BID per Epileptologist  Resume home lamotrigine per primary  team  Continuous EEG ordered, in process           Leukocytosis     Overview Addendum 9/26/2024  8:44 AM by Molly Gifford APRN           WBC Count (10*3/uL)   Date/Time Value   09/25/2024 2014 11.74 (H)      Multifactorial etiology  Trend for now, may require further evaluation in near future              Electrolyte abnormality     Overview Signed 9/25/2024  5:46 PM by Molly Grover APRN       Replace per ICU protocol           Epilepsy (CMS/HCC)     Overview Signed 9/25/2024  5:46 PM by Molly Grover APRN       Takes lamotrigine as home med  resume           Hypertension     Overview Addendum 9/26/2024  8:44 AM by Molly Gifford APRN       SBP goal <160  PRN hydralazine and labetalol  Cardene as necessary  Arterial line monitoring  Resume home medications as appropriate  Was reportedly trying to wean off BP meds prior to event              * (Principal) Ischemic brain damage     Overview Signed 9/27/2024  8:06 AM by Major Dominique MD       Secondary to status epilepticus           RESOLVED: ICH (intracerebral hemorrhage) (CMS/HCC)     Overview Signed 9/25/2024  5:44 PM by Molly Grover APRN       Likely secondary to hypertension; etiology per primary team  NS consulted   Repeat CT scan of head pending  Cardene infusion to maintain SBP less than 140  Sodium goal 145-150 for cerebral edema  Keep HOB elevated  Hold all AP/AC/DVT ppx  NIHSS and neuro examinations per ICU protocol  Will continue ongoing stroke education              RESOLVED: Intracerebral hemorrhage, nontraumatic (CMS/[facility])            Post Discharge Instructions  Follow-up with your neurologist at [facility] as soon as possible (within the next 4 weeks) to discuss this admission and possibility of tapering off of levetiracetam.  For now, continue taking 1000mg levetiracetam twice each day.  Continue taking lamotrigine 200mg twice each day.  Follow-up with your primary care physician to manage your blood pressure.  A referral to   Speech Therapy has been made, schedule with Speech Language Pathology as advised.      Outpatient Follow-Up  No future appointments.     Test Results Pending At Discharge  Pending Labs         Order Current Status     Hemoglobin A1c In process     Phosphatidylethanol (PEth), Whole Blood, Quantitative In process                Pertinent Physical Exam At Time of Discharge  Physical Exam  Constitutional:       Appearance: Normal appearance.   HENT:      Head: Normocephalic and atraumatic.      Mouth/Throat:      Mouth: Mucous membranes are moist.      Pharynx: Oropharynx is clear.   Eyes:      Extraocular Movements: Extraocular movements intact.      Conjunctiva/sclera: Conjunctivae normal.      Pupils: Pupils are equal, round, and reactive to light.   Cardiovascular:      Rate and Rhythm: Normal rate and regular rhythm.      Heart sounds: Normal heart sounds.   Pulmonary:      Effort: Pulmonary effort is normal.      Breath sounds: Normal breath sounds.   Abdominal:      General: There is no distension.      Tenderness: There is no abdominal tenderness.   Musculoskeletal:         General: Normal range of motion.      Cervical back: Normal range of motion.   Neurological:      Mental Status: He is alert. Mental status is at baseline.   Psychiatric:         Mood and Affect: Mood normal.         Behavior: Behavior normal.            Discharge Disposition/Condition  Disposition: Home  Condition: Stable (s/sx potential problems absent or manageable)       To see Neurology tomorrow.    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  History of seizures.  MRI Brain, 12/7/2015 s/p craniotomy L parietal with mild T2 signal changes.          The following portions of the patient's history were reviewed and updated as appropriate: allergies, current medications, past family history, past medical history, past social history, past surgical history, and problem list.    Review of Systems   Constitutional:   "Negative for chills and fever.   Gastrointestinal:  Negative for diarrhea, nausea and vomiting.   Neurological:  Negative for dizziness.   Psychiatric/Behavioral:  The patient is not nervous/anxious.         Denies Depression.       Objective   /72   Pulse 90   Temp 97.4 °F (36.3 °C) (Temporal)   Ht 193 cm (75.98\")   Wt 104 kg (229 lb 6.4 oz)   SpO2 98%   BMI 27.94 kg/m²   Physical Exam  Vitals and nursing note reviewed.   Constitutional:       Appearance: Normal appearance.   HENT:      Head: Normocephalic and atraumatic.   Neck:      Vascular: No carotid bruit.   Cardiovascular:      Rate and Rhythm: Normal rate and regular rhythm.      Heart sounds: Normal heart sounds. No murmur heard.  Pulmonary:      Effort: Pulmonary effort is normal.      Breath sounds: Normal breath sounds.   Abdominal:      General: Bowel sounds are normal.      Palpations: Abdomen is soft. There is no mass.      Tenderness: There is no abdominal tenderness.   Musculoskeletal:      Right lower leg: No edema.      Left lower leg: No edema.   Skin:     Coloration: Skin is not jaundiced or pale.      Findings: No erythema.   Neurological:      Mental Status: He is alert. Mental status is at baseline.      Cranial Nerves: No cranial nerve deficit.   Psychiatric:         Mood and Affect: Mood normal.         Behavior: Behavior normal.         Assessment & Plan       1. Hospital discharge follow-up      2. Noncompliance with medication regimen  Had missed recent doses of Lamictal leading up to symptoms.      3. seizures  As above, testing at  indicated seizure and not stroke.  Patient had reported missing some doses of his Lamictal.  Was started on Keppra.  Patient has upcoming appointment with neurology, and I encouraged him to keep the appointment.  Patient with history of epilepsy after cerebral neoplasm status postcraniotomy and resection in his 20s.  Blood pressure 116/72 in office today.        Part of this note may be an " electronic transcription/translation of spoken language to printed text using the Dragon Dictation System.

## 2024-10-03 ENCOUNTER — OFFICE VISIT (OUTPATIENT)
Dept: NEUROLOGY | Facility: CLINIC | Age: 64
End: 2024-10-03
Payer: COMMERCIAL

## 2024-10-03 VITALS
HEIGHT: 76 IN | DIASTOLIC BLOOD PRESSURE: 84 MMHG | BODY MASS INDEX: 27.89 KG/M2 | SYSTOLIC BLOOD PRESSURE: 136 MMHG | OXYGEN SATURATION: 95 % | WEIGHT: 229 LBS | HEART RATE: 89 BPM

## 2024-10-03 DIAGNOSIS — G40.209 COMPLEX PARTIAL EPILEPSY: Primary | Chronic | ICD-10-CM

## 2024-10-03 PROCEDURE — 99214 OFFICE O/P EST MOD 30 MIN: CPT | Performed by: PSYCHIATRY & NEUROLOGY

## 2024-10-03 NOTE — PROGRESS NOTES
"Chief Complaint  Seizures    Subjective        [facility] Laya presents to [facility] NEUROLOGY  History of Present Illness    64 y.o. male returns in follow up.  Last visit on 9/6/24 continued LTG. .    MRI Brain, 12/7/2015 s/p craniotomy L parietal with mild T2 signal changes.     Admitted [facility] 9/25/24 - 9/27/24.  Continued LTG and added LVT.    EEG Two electrographic seizures in left centro-temporal-parietal region, left LPDs, left centro-parietal sharps     9/25/24 breakthrough sz.after missing multiple dosage.     MRI head revealed swelling and diffusion restriction involving the left hippocampus and amygdala suggestive of ictal changes      Reviewed medical records:     Previously followed by Dr Carrera for GTC sz.       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  3 GTC sz after surgery and on PHT for 4 years.  Off AED for 35 years then sz 7/31/15.     Aura: aphasia, right sided numbness, visual sx.  Sz d/o last episode 10 yrs ago.       AED:  LTG, OXC, PHT, LVT  Objective   Vital Signs:  /84   Pulse 89   Ht 193 cm (75.98\")   Wt 104 kg (229 lb)   SpO2 95%   BMI 27.89 kg/m²   Estimated body mass index is 27.89 kg/m² as calculated from the following:    Height as of this encounter: 193 cm (75.98\").    Weight as of this encounter: 104 kg (229 lb).    Neurologic Exam     Mental Status   Oriented to person, place, and time.   Speech: speech is normal   Level of consciousness: alert  Knowledge: good and consistent with education.   Normal comprehension.     Cranial Nerves   Cranial nerves II through XII intact.     CN II   Visual fields full to confrontation.   Visual acuity: normal  Right visual field deficit: none  Left visual field deficit: none     CN III, IV, VI   Pupils are equal, round, and reactive to light.  Extraocular motions are normal.   Nystagmus: none   Diplopia: none  Ophthalmoparesis: none  Upgaze: normal  Downgaze: normal  Conjugate gaze: " present    CN V   Facial sensation intact.   Right corneal reflex: normal  Left corneal reflex: normal    CN VII   Right facial weakness: none  Left facial weakness: none    CN VIII   Hearing: intact    CN IX, X   Palate: symmetric  Right gag reflex: normal  Left gag reflex: normal    CN XI   Right sternocleidomastoid strength: normal  Left sternocleidomastoid strength: normal    CN XII   Tongue: not atrophic  Fasciculations: absent  Tongue deviation: none    Motor Exam   Muscle bulk: normal  Overall muscle tone: normal    Strength   Strength 5/5 throughout.     Sensory Exam   Light touch normal.     Gait, Coordination, and Reflexes     Gait  Gait: normal    Tremor   Resting tremor: absent  Intention tremor: absent  Action tremor: absent    Reflexes   Reflexes 2+ except as noted.          Physical Exam  Eyes:      Extraocular Movements: EOM normal.      Pupils: Pupils are equal, round, and reactive to light.   Neurological:      Mental Status: He is oriented to person, place, and time.      Cranial Nerves: Cranial nerves 2-12 are intact.      Motor: Motor strength is normal.     Gait: Gait is intact.   Psychiatric:         Speech: Speech normal.        Result Review :  The following data was reviewed by: Jp Bautista MD on 10/03/2024:  Common labs          9/25/2024    15:08 9/25/2024    17:55 9/25/2024    20:14 9/26/2024    01:03 9/26/2024    01:04 9/26/2024    12:49 9/27/2024    02:21   Common Labs   Glucose     154         Sodium  134     139     138          Potassium     4.0         Chloride     105         WBC 12.34      11.74       10.70     8.58       Hemoglobin 16.4      15.9       14.6     14.8       Hematocrit 49.5      46.8       43.3     44.6       Platelets 209      214       222     214       Hemoglobin A1C   6.0              Details          This result is from an external source.                       Assessment and Plan   Diagnoses and all orders for this visit:    1. Complex partial epilepsy  (Primary)  Assessment & Plan:  Continue LTG and LVT     No driving for 90 days               Follow Up   Return in about 6 months (around 4/3/2025).  Patient was given instructions and counseling regarding his condition or for health maintenance advice. Please see specific information pulled into the AVS if appropriate.

## 2024-10-04 RX ORDER — LEVETIRACETAM 500 MG/1
500 TABLET ORAL 2 TIMES DAILY
Qty: 180 TABLET | Refills: 1 | Status: SHIPPED | OUTPATIENT
Start: 2024-10-04

## 2024-10-04 NOTE — TELEPHONE ENCOUNTER
Caller: Randy Asif Dale    Relationship: Self    Best call back number: [phone]     Requested Prescriptions:   Requested Prescriptions     Pending Prescriptions Disp Refills    levETIRAcetam (KEPPRA) 1000 MG tablet       Sig: Take 1 tablet by mouth 2 (Two) Times a Day.        Pharmacy where request should be sent: [facility] [id number]  AT [facility] [address] [place] [address] - [phone]  - [phone] FX     Last office visit with prescribing clinician: 10/3/2024   Last telemedicine visit with prescribing clinician: Visit date not found   Next office visit with prescribing clinician: 4/4/2025     Additional details provided by patient: PATIENT RECEIVED THIS RX FROM  AND WANTS DR LEWIS TO REFILL AND TAKE THIS OVER. PT HAS APPROX 14 DOSES LEFT    Does the patient have less than a 3 day supply:  [] Yes  [x] No    Would you like a call back once the refill request has been completed: [] Yes [x] No    If the office needs to give you a call back, can they leave a voicemail: [x] Yes [] No    Benjy Power Rep   10/04/24 12:14 EDT

## 2024-12-01 DIAGNOSIS — I10 ESSENTIAL HYPERTENSION: ICD-10-CM

## 2024-12-05 RX ORDER — CARVEDILOL 12.5 MG/1
12.5 TABLET ORAL 2 TIMES DAILY WITH MEALS
Qty: 180 TABLET | Refills: 0 | Status: SHIPPED | OUTPATIENT
Start: 2024-12-05

## 2024-12-24 ENCOUNTER — PATIENT MESSAGE (OUTPATIENT)
Dept: NEUROLOGY | Facility: CLINIC | Age: 64
End: 2024-12-24
Payer: COMMERCIAL

## 2025-01-03 ENCOUNTER — OFFICE VISIT (OUTPATIENT)
Dept: NEUROLOGY | Facility: CLINIC | Age: 65
End: 2025-01-03
Payer: COMMERCIAL

## 2025-01-03 VITALS
SYSTOLIC BLOOD PRESSURE: 130 MMHG | HEIGHT: 76 IN | WEIGHT: 238.8 LBS | DIASTOLIC BLOOD PRESSURE: 84 MMHG | OXYGEN SATURATION: 94 % | BODY MASS INDEX: 29.08 KG/M2 | HEART RATE: 88 BPM

## 2025-01-03 DIAGNOSIS — G40.209 COMPLEX PARTIAL EPILEPSY: Primary | Chronic | ICD-10-CM

## 2025-01-03 PROCEDURE — 99214 OFFICE O/P EST MOD 30 MIN: CPT | Performed by: PSYCHIATRY & NEUROLOGY

## 2025-01-03 RX ORDER — LAMOTRIGINE 200 MG/1
200 TABLET ORAL 2 TIMES DAILY
Qty: 180 TABLET | Refills: 3 | Status: SHIPPED | OUTPATIENT
Start: 2025-01-03

## 2025-01-03 RX ORDER — LEVETIRACETAM 500 MG/1
500 TABLET ORAL 2 TIMES DAILY
Qty: 180 TABLET | Refills: 1 | Status: SHIPPED | OUTPATIENT
Start: 2025-01-03

## 2025-01-03 NOTE — PROGRESS NOTES
"Chief Complaint    Seizures    Subjective        Randy Richwood Laya presents to [facility] NEUROLOGY  History of Present Illness    64 y.o. male returns in follow up.  Last visit on 10/3/24 continued LTG. .     MRI Brain, 12/7/2015 s/p craniotomy L parietal with mild T2 signal changes.     Admitted [facility] 9/25/24 - 9/27/24.  Continued LTG and added LVT.     EEG Two electrographic seizures in left centro-temporal-parietal region, left LPDs, left centro-parietal sharps      9/25/24 breakthrough sz.after missing multiple dosage.      MRI head revealed swelling and diffusion restriction involving the left hippocampus and amygdala suggestive of ictal changes      Reviewed medical records:     Previously followed by Dr Carrera for GTC sz.       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  3 GTC sz after surgery and on PHT for 4 years.  Off AED for 35 years then sz 7/31/15.     Aura: aphasia, right sided numbness, visual sx.  Sz d/o last episode 10 yrs ago.       AED:  LTG, OXC, PHT, LVT  Objective   Vital Signs:  /84   Pulse 88   Ht 193 cm (75.98\")   Wt 108 kg (238 lb 12.8 oz)   SpO2 94%   BMI 29.08 kg/m²   Estimated body mass index is 29.08 kg/m² as calculated from the following:    Height as of this encounter: 193 cm (75.98\").    Weight as of this encounter: 108 kg (238 lb 12.8 oz).    Neurological Exam  Mental Status  Awake, alert and oriented to person, place and time. Oriented to person, place and time. Speech is normal. Language is fluent with no aphasia. Attention and concentration are normal. Fund of knowledge is appropriate for level of education.    Cranial Nerves  CN III, IV, VI: Extraocular movements intact bilaterally. Pupils equal round and reactive to light bilaterally.  CN V: Facial sensation is normal.  CN VII: Full and symmetric facial movement.  CN IX, X: Palate elevates symmetrically  CN XI: Shoulder shrug strength is normal.  CN XII: Tongue midline " without atrophy or fasciculations.    Motor   Strength is 5/5 throughout all four extremities.    Sensory  Sensation is intact to light touch, pinprick, vibration and proprioception in all four extremities.    Reflexes  Deep tendon reflexes are 2+ and symmetric in all four extremities.    Coordination    Finger-to-nose, rapid alternating movements and heel-to-shin normal bilaterally without dysmetria.    Gait  Normal casual, toe, heel and tandem gait.           Physical Exam  Eyes:      Extraocular Movements: Extraocular movements intact.      Pupils: Pupils are equal, round, and reactive to light.   Neurological:      Motor: Motor strength is normal.     Coordination: Coordination is intact.      Deep Tendon Reflexes: Reflexes are normal and symmetric.   Psychiatric:         Speech: Speech normal.        Result Review :  The following data was reviewed by: Jp Bautista MD on 01/03/2025:  Common labs          9/25/2024    15:08 9/25/2024    17:55 9/25/2024    20:14 9/26/2024    01:03 9/26/2024    01:04 9/26/2024    12:49 9/27/2024    02:21   Common Labs   Glucose     154         Sodium  134     139     138          Potassium     4.0         Chloride     105         WBC 12.34      11.74       10.70     8.58       Hemoglobin 16.4      15.9       14.6     14.8       Hematocrit 49.5      46.8       43.3     44.6       Platelets 209      214       222     214       Hemoglobin A1C   6.0              Details          This result is from an external source.                       Assessment and Plan   Diagnoses and all orders for this visit:    1. Complex partial epilepsy (Primary)  Assessment & Plan:  Pt requests MRI     Continue LTG, LVT    No driving     Orders:  -     MRI Brain Without Contrast; Future    Other orders  -     lamoTRIgine (LaMICtal) 200 MG tablet; Take 1 tablet by mouth 2 (Two) Times a Day.  Dispense: 180 tablet; Refill: 3  -     levETIRAcetam (KEPPRA) 500 MG tablet; Take 1 tablet by mouth 2 (Two)  Times a Day.  Dispense: 180 tablet; Refill: 1             Follow Up   No follow-ups on file.  Patient was given instructions and counseling regarding his condition or for health maintenance advice. Please see specific information pulled into the AVS if appropriate.

## 2025-01-21 ENCOUNTER — HOSPITAL ENCOUNTER (OUTPATIENT)
Dept: MRI IMAGING | Facility: HOSPITAL | Age: 65
Discharge: HOME | End: 2025-01-21
Admitting: PSYCHIATRY & NEUROLOGY
Payer: COMMERCIAL

## 2025-01-21 ENCOUNTER — TELEPHONE (OUTPATIENT)
Dept: NEUROLOGY | Facility: CLINIC | Age: 65
End: 2025-01-21
Payer: COMMERCIAL

## 2025-01-21 DIAGNOSIS — G40.209 COMPLEX PARTIAL EPILEPSY: Chronic | ICD-10-CM

## 2025-01-21 PROCEDURE — 70551 MRI BRAIN STEM W/O DYE: CPT

## 2025-01-21 NOTE — TELEPHONE ENCOUNTER
----- Message from Jp Bautista sent at 1/21/2025 11:41 AM EST -----  Notify pt MRI has no concerning findings

## 2025-01-21 NOTE — TELEPHONE ENCOUNTER
Spoke to patient to inform per Dr Bautista that his MRI results showed no concerning findings.  He expressed appreciation.

## 2025-02-18 ENCOUNTER — TELEPHONE (OUTPATIENT)
Dept: NEUROLOGY | Facility: CLINIC | Age: 65
End: 2025-02-18
Payer: COMMERCIAL

## 2025-02-18 RX ORDER — LEVETIRACETAM 1000 MG/1
1000 TABLET ORAL 2 TIMES DAILY
Qty: 180 TABLET | Refills: 3 | Status: SHIPPED | OUTPATIENT
Start: 2025-02-18

## 2025-02-18 NOTE — TELEPHONE ENCOUNTER
Provider: DR LEWIS    Caller: Randy Asif Dale    Relationship to Patient: Self    Pharmacy: [facility]    Phone Number: [phone]    Reason for Call:   PT HAD A SEIZURE ON 2-125 AND WAS SEEN AT  ED AND KEPT OVERNIGHT. PT'S WIFE STATED IT WASN'T AS BAD AS THE LAST ONE.    When was the patient last seen:   1-3-25    What therapies/medications have you tried:   PT HAS BEEN TAKING SEIZURE RX AS DIRECTED.    PT IS SCHEDULED FOR A FU ON 4-8-25 AND IS ON WAIT LIST FOR A SOONER APPT.    PLEASE CALL PT BACK TO LET HIM KNOW IF HE CAN BE SEEN SOONER.

## 2025-02-18 NOTE — TELEPHONE ENCOUNTER
Spoke with patient to inform per Dr Bautista that he would like him to increase his keppra to 1000 mg two times per day.  Informed that Dr Bautista has sent in a new prescription for the new dosage but he can take 2 of the 500mg two times per day for a few days and make sure that he does not have any further episodes before picking up the new dosage.  He expressed understanding and appreciation.

## 2025-02-21 ENCOUNTER — OFFICE VISIT (OUTPATIENT)
Dept: FAMILY MEDICINE CLINIC | Facility: CLINIC | Age: 65
End: 2025-02-21
Payer: COMMERCIAL

## 2025-02-21 VITALS
RESPIRATION RATE: 18 BRPM | TEMPERATURE: 98.2 F | HEART RATE: 94 BPM | BODY MASS INDEX: 28.49 KG/M2 | OXYGEN SATURATION: 97 % | WEIGHT: 234 LBS | HEIGHT: 76 IN | SYSTOLIC BLOOD PRESSURE: 134 MMHG | DIASTOLIC BLOOD PRESSURE: 68 MMHG

## 2025-02-21 DIAGNOSIS — R59.1 LYMPHADENOPATHY: Primary | ICD-10-CM

## 2025-02-21 PROCEDURE — 99214 OFFICE O/P EST MOD 30 MIN: CPT | Performed by: STUDENT IN AN ORGANIZED HEALTH CARE EDUCATION/TRAINING PROGRAM

## 2025-02-21 NOTE — PROGRESS NOTES
"Chief Complaint  Adenopathy (Left side swollen lymph node. Pt had upper molar removed wednesday that had an infection. Pt was advised to stop abx)    History of Present Illness      Patient had a tooth infection recently. Tooth extraction was Wednesday. He has stopped his antibiotic since his procedure. He has been using chlorhexidine topically in his mouth. He developed a swelling in left side today about a hour ago. He was eating chocolate and noticed it to be slightly tender. He has not had any fever. He denies any cough ear pain sore throat runny nose or sinus pain. No fever. No other complaints today.       The following portions of the patient's history were reviewed and updated as appropriate: allergies, current medications, past family history, past medical history, past social history, past surgical history, and problem list.    OBJECTIVE:  /68   Pulse 94   Temp 98.2 °F (36.8 °C) (Temporal)   Resp 18   Ht 193 cm (75.98\")   Wt 106 kg (234 lb)   SpO2 97%   BMI 28.50 kg/m²       Physical Exam  Constitutional:       General: He is not in acute distress.     Appearance: Normal appearance.   HENT:      Head: Normocephalic and atraumatic.      Comments: Left side of neck has large smooth mobile lymph node  Patient talking comfortable no respiratory distress     Left Ear: Tympanic membrane normal.      Mouth/Throat:      Pharynx: Posterior oropharyngeal erythema present. No oropharyngeal exudate.   Eyes:      Extraocular Movements: Extraocular movements intact.   Skin:     Findings: No rash.   Neurological:      General: No focal deficit present.      Mental Status: He is alert.   Psychiatric:         Mood and Affect: Mood normal.                  Assessment and Plan   Diagnoses and all orders for this visit:    1. Lymphadenopathy (Primary)      Lymph node has developed and now painful since his extraction and is large on exam. Given this I am concerned for infective process post procedure. He doesn't " seem to have pain at the extraction site and it does not have redness or drainage to the gum. We discussed watchful waiting as inflammation can also cause inflamed nodes. He prefers to take antibiotic. Discussed risk of gi side effects and he takes probiotics. He will discuss further with his surgeon on Monday. I asked him to go to the er for any fever or worsening swelling to the neck given concern for compressive symptoms like sob or difficulty swallowing. Patient understands.         Return in about 1 week (around 2/28/2025) for node check.       Nevin Nelson D.O.  [facility] [place]

## 2025-02-28 ENCOUNTER — OFFICE VISIT (OUTPATIENT)
Dept: FAMILY MEDICINE CLINIC | Facility: CLINIC | Age: 65
End: 2025-02-28
Payer: COMMERCIAL

## 2025-02-28 VITALS
BODY MASS INDEX: 28.86 KG/M2 | DIASTOLIC BLOOD PRESSURE: 90 MMHG | HEART RATE: 94 BPM | TEMPERATURE: 98.6 F | SYSTOLIC BLOOD PRESSURE: 138 MMHG | HEIGHT: 76 IN | OXYGEN SATURATION: 98 % | WEIGHT: 237 LBS

## 2025-02-28 DIAGNOSIS — G40.209 COMPLEX PARTIAL EPILEPSY: Primary | Chronic | ICD-10-CM

## 2025-02-28 DIAGNOSIS — I10 PRIMARY HYPERTENSION: Chronic | ICD-10-CM

## 2025-02-28 PROCEDURE — 99213 OFFICE O/P EST LOW 20 MIN: CPT | Performed by: FAMILY MEDICINE

## 2025-02-28 NOTE — PATIENT INSTRUCTIONS
Take medications as ordered.  Low fat, low salt diet.  Exercise as tolerated.   Continue Keppra at current dosing.  Continue to follow with Neurology.  Keep appt with Podiatry.

## 2025-02-28 NOTE — PROGRESS NOTES
Follow Up Office Visit      Date: 2025   Patient Name: Randy Asif  : 1960   MRN: [id number]     Chief Complaint:    Chief Complaint   Patient presents with    Seizures     Had seizure 2/15       History of Present Illness: Randy Asif is a 65 y.o. male who presents today scheduled for follow-up for node check.    Patient was seen in office on 2025 per Dr. Nelson.  Was seen for adenopathy left side swollen lymph node.  Had upper molar removed and then had infection.    Had reported stopping the antibiotic after his procedure.  Has been using chlorhexidine topically in his mouth.  Developed swelling in the left neck area.  I had discussed watchful waiting versus antibiotic course  Patient was prescribed Augmentin course.  Twice daily x 5 day.  -----    Reports had a seizure on 2/15/25.      Reports neurology tried lower Keppra dose.  Reports now back up to 1000 twice daily.  Reports doing better now.  Can have some side effects slight dizziness , but symptoms are improving.      Subjective      Review of Systems:   Review of Systems   Constitutional:  Negative for chills, diaphoresis, fatigue and fever.   HENT:  Negative for congestion, sore throat and swollen glands.    Respiratory:  Negative for cough.    Cardiovascular:  Negative for chest pain.   Gastrointestinal:  Negative for abdominal pain, nausea and vomiting.   Genitourinary:  Negative for dysuria.   Musculoskeletal:  Negative for myalgias and neck pain.   Skin:  Negative for rash.   Neurological:  Negative for weakness and numbness.       I have reviewed the patients family history, social history, past medical history, past surgical history and have updated it as appropriate.     Medications:     Current Outpatient Medications:     ciclopirox (PENLAC) 8 % solution, Apply  topically to the appropriate area as directed Every Night., Disp: 6 mL, Rfl: 1    clobetasol (TEMOVATE) 0.05 % cream, Apply to affected areas in thin  "layer BID, Disp: 30 g, Rfl: 0    lamoTRIgine (LaMICtal) 200 MG tablet, Take 1 tablet by mouth 2 (Two) Times a Day., Disp: 180 tablet, Rfl: 3    levETIRAcetam (KEPPRA) 1000 MG tablet, Take 1 tablet by mouth 2 (Two) Times a Day., Disp: 180 tablet, Rfl: 3    multivitamin (MULTI VITAMIN PO), Take  by mouth Daily., Disp: , Rfl:     Omega-3 Fatty Acids (FISH OIL PO), Take  by mouth., Disp: , Rfl:     sildenafil (REVATIO) 20 MG tablet, Take 5 tablets by mouth Daily As Needed (erectile dysfunction)., Disp: 120 tablet, Rfl: 1    amoxicillin-clavulanate (AUGMENTIN) 875-125 MG per tablet, Take 1 tablet by mouth 2 (Two) Times a Day. (Patient not taking: Reported on 2/28/2025), Disp: 10 tablet, Rfl: 0    Allergies:   Allergies   Allergen Reactions    Lisinopril Cough    Latex Other (See Comments)    Oxycodone-Acetaminophen Anxiety and Unknown - Low Severity     acetaminophen / oxycodone    Percocet [Oxycodone-Acetaminophen] Anxiety       Objective     Physical Exam: Please see above  Vital Signs:   Vitals:    02/28/25 1024   BP: 138/90   Pulse: 94   Temp: 98.6 °F (37 °C)   TempSrc: Infrared   SpO2: 98%   Weight: 108 kg (237 lb)   Height: 193 cm (75.98\")   PainSc: 0-No pain     Body mass index is 28.86 kg/m².          Physical Exam  Vitals and nursing note reviewed.   Constitutional:       Appearance: Normal appearance.   HENT:      Head: Normocephalic and atraumatic.   Neck:      Vascular: No carotid bruit.   Cardiovascular:      Rate and Rhythm: Normal rate and regular rhythm.      Heart sounds: Normal heart sounds. No murmur heard.  Pulmonary:      Effort: Pulmonary effort is normal.      Breath sounds: Normal breath sounds.   Abdominal:      General: Bowel sounds are normal.      Palpations: Abdomen is soft. There is no mass.      Tenderness: There is no abdominal tenderness.   Musculoskeletal:      Right lower leg: No edema.      Left lower leg: No edema.   Skin:     Coloration: Skin is not jaundiced or pale.      Findings: " No erythema.   Neurological:      Mental Status: He is alert. Mental status is at baseline.   Psychiatric:         Mood and Affect: Mood normal.         Behavior: Behavior normal.         Procedures    Results:   Labs:   Hemoglobin A1C   Date Value Ref Range Status   09/25/2024 6 (H) <5.7 % Final     TSH   Date Value Ref Range Status   05/31/2024 0.751 0.270 - 4.200 uIU/mL Final        POCT Results (if applicable):   Results for orders placed or performed in visit on 05/31/24   Comprehensive Metabolic Panel    Collection Time: 05/31/24  8:07 AM    Specimen: Blood   Result Value Ref Range    Glucose 117 (H) 65 - 99 mg/dL    BUN 12 8 - 23 mg/dL    Creatinine 0.83 0.76 - 1.27 mg/dL    Sodium 139 136 - 145 mmol/L    Potassium 4.6 3.5 - 5.2 mmol/L    Chloride 103 98 - 107 mmol/L    CO2 28.0 22.0 - 29.0 mmol/L    Calcium 9.3 8.6 - 10.5 mg/dL    Total Protein 6.9 6.0 - 8.5 g/dL    Albumin 4.6 3.5 - 5.2 g/dL    ALT (SGPT) 41 1 - 41 U/L    AST (SGOT) 23 1 - 40 U/L    Alkaline Phosphatase 68 39 - 117 U/L    Total Bilirubin 0.4 0.0 - 1.2 mg/dL    Globulin 2.3 gm/dL    A/G Ratio 2.0 g/dL    BUN/Creatinine Ratio 14.5 7.0 - 25.0    Anion Gap 8.0 5.0 - 15.0 mmol/L    eGFR 98.3 >60.0 mL/min/1.73   Lipid Panel    Collection Time: 05/31/24  8:07 AM    Specimen: Blood   Result Value Ref Range    Total Cholesterol 220 (H) 0 - 200 mg/dL    Triglycerides 150 0 - 150 mg/dL    HDL Cholesterol 37 (L) 40 - 60 mg/dL    LDL Cholesterol  156 (H) 0 - 100 mg/dL    VLDL Cholesterol 27 5 - 40 mg/dL    LDL/HDL Ratio 4.14    PSA Screen    Collection Time: 05/31/24  8:07 AM    Specimen: Blood   Result Value Ref Range    PSA 0.776 0.000 - 4.000 ng/mL   TSH    Collection Time: 05/31/24  8:07 AM    Specimen: Blood   Result Value Ref Range    TSH 0.751 0.270 - 4.200 uIU/mL   Hemoglobin A1c    Collection Time: 05/31/24  8:07 AM    Specimen: Blood   Result Value Ref Range    Hemoglobin A1C 5.80 (H) 4.80 - 5.60 %       PHQ-9: PHQ-9 Total Score:        Imaging:   No valid procedures specified.         Smoking Cessation:   Does not smoke    Assessment / Plan      Assessment/Plan:     1. Complex partial epilepsy  Patient to continue to follow with Neurology.  Continue Keppra and Lamictal.    2. Primary hypertension  Discussed importance of following low-salt diet for blood pressure control.      Part of this note may be an electronic transcription/translation of spoken language to printed text using the Dragon Dictation System.        Vaccine Counseling:      Follow Up:   Return in about 3 months (around 5/28/2025).      DO CHEO Cabrera

## 2025-03-06 ENCOUNTER — OFFICE VISIT (OUTPATIENT)
Dept: FAMILY MEDICINE CLINIC | Facility: CLINIC | Age: 65
End: 2025-03-06
Payer: COMMERCIAL

## 2025-03-06 VITALS
OXYGEN SATURATION: 98 % | BODY MASS INDEX: 27.4 KG/M2 | SYSTOLIC BLOOD PRESSURE: 118 MMHG | DIASTOLIC BLOOD PRESSURE: 76 MMHG | HEART RATE: 87 BPM | TEMPERATURE: 97.6 F | WEIGHT: 225 LBS | HEIGHT: 76 IN

## 2025-03-06 DIAGNOSIS — J40 BRONCHITIS: Primary | ICD-10-CM

## 2025-03-06 PROCEDURE — 99213 OFFICE O/P EST LOW 20 MIN: CPT | Performed by: FAMILY MEDICINE

## 2025-03-06 RX ORDER — DOXYCYCLINE 100 MG/1
100 CAPSULE ORAL 2 TIMES DAILY
Qty: 14 CAPSULE | Refills: 0 | Status: SHIPPED | OUTPATIENT
Start: 2025-03-06

## 2025-03-06 NOTE — PROGRESS NOTES
Follow Up Office Visit      Patient Name: Randy Asif  : 1960   MRN: [id number]     Chief Complaint:    Chief Complaint   Patient presents with    Bronchitis     Started coughing on Saturday  He had diarrhea on Saturday night but it went right away.  No fever, no body aches, cough is productive. When he lays down he can hear crackling and the cough gets worse.       History of Present Illness: Randy sAif is a 65 y.o. male who is here today to follow up with cough and feeling sick for 6 days. Has been sick all week. Has productive cough. Is overall better. Covid test negative. Chest rattling.     Still feels sick overall.  Having productive cough      Physical exam: Rhonchi noted on the right.  Egophony positive on the right.  Lymphadenopathy noted in the submandibular regions.      Subjective        I have reviewed and the following portions of the patient's history were updated as appropriate: past family history, past medical history, past social history, past surgical history and problem list.    Medications:     Current Outpatient Medications:     ciclopirox (PENLAC) 8 % solution, Apply  topically to the appropriate area as directed Every Night., Disp: 6 mL, Rfl: 1    clobetasol (TEMOVATE) 0.05 % cream, Apply to affected areas in thin layer BID, Disp: 30 g, Rfl: 0    lamoTRIgine (LaMICtal) 200 MG tablet, Take 1 tablet by mouth 2 (Two) Times a Day., Disp: 180 tablet, Rfl: 3    levETIRAcetam (KEPPRA) 1000 MG tablet, Take 1 tablet by mouth 2 (Two) Times a Day., Disp: 180 tablet, Rfl: 3    multivitamin (MULTI VITAMIN PO), Take  by mouth Daily., Disp: , Rfl:     Omega-3 Fatty Acids (FISH OIL PO), Take  by mouth., Disp: , Rfl:     sildenafil (REVATIO) 20 MG tablet, Take 5 tablets by mouth Daily As Needed (erectile dysfunction)., Disp: 120 tablet, Rfl: 1    doxycycline (MONODOX) 100 MG capsule, Take 1 capsule by mouth 2 (Two) Times a Day., Disp: 14 capsule, Rfl: 0    Allergies:   Allergies  "  Allergen Reactions    Lisinopril Cough    Latex Other (See Comments)    Oxycodone-Acetaminophen Anxiety and Unknown - Low Severity     acetaminophen / oxycodone    Percocet [Oxycodone-Acetaminophen] Anxiety       Objective     Physical Exam: Please see above  Vital Signs:   Vitals:    03/06/25 1501   BP: 118/76   Pulse: 87   Temp: 97.6 °F (36.4 °C)   TempSrc: Infrared   SpO2: 98%   Weight: 102 kg (225 lb)   Height: 193 cm (75.98\")   PainSc: 2   Comment: unless coughing then lungs feel like a 4     Body mass index is 27.4 kg/m².          Assessment / Plan      Assessment/Plan:   Diagnoses and all orders for this visit:    1. Bronchitis (Primary)  -     doxycycline (MONODOX) 100 MG capsule; Take 1 capsule by mouth 2 (Two) Times a Day.  Dispense: 14 capsule; Refill: 0    Patient presents with a bronchitis which has not been improving over the past week.  Even though he was on antibiotic recently, I think he would benefit from being on doxycycline for 5-7 days.  Symptoms should improve.  Recommend finishing antibiotic and giving symptoms another week or 2 to improve.  If he gets worse at any time he should call    Follow Up:   Return if symptoms worsen or fail to improve.    Wolf Montgomery, DO  [facility] [place]   "

## 2025-03-26 ENCOUNTER — OFFICE VISIT (OUTPATIENT)
Dept: CARDIOLOGY | Facility: CLINIC | Age: 65
End: 2025-03-26
Payer: COMMERCIAL

## 2025-03-26 ENCOUNTER — SPECIALTY PHARMACY (OUTPATIENT)
Dept: CARDIOLOGY | Facility: CLINIC | Age: 65
End: 2025-03-26
Payer: COMMERCIAL

## 2025-03-26 VITALS
HEIGHT: 76 IN | OXYGEN SATURATION: 95 % | HEART RATE: 87 BPM | WEIGHT: 229.2 LBS | SYSTOLIC BLOOD PRESSURE: 122 MMHG | BODY MASS INDEX: 27.91 KG/M2 | DIASTOLIC BLOOD PRESSURE: 88 MMHG

## 2025-03-26 DIAGNOSIS — I49.3 PVC'S (PREMATURE VENTRICULAR CONTRACTIONS): Primary | Chronic | ICD-10-CM

## 2025-03-26 DIAGNOSIS — E78.2 MIXED HYPERLIPIDEMIA: Chronic | ICD-10-CM

## 2025-03-26 DIAGNOSIS — I10 PRIMARY HYPERTENSION: Chronic | ICD-10-CM

## 2025-03-26 PROCEDURE — 99214 OFFICE O/P EST MOD 30 MIN: CPT | Performed by: INTERNAL MEDICINE

## 2025-03-26 NOTE — PROGRESS NOTES
[facility] Cardiology  Office visit  Randy Asif  1960  [phone]  [phone] (work)    VISIT DATE:  3/26/2025    PCP: Joe Pearce DO  [address] [place], [place] [id number]    CC:  Chief Complaint   Patient presents with    PVC's (premature ventricular contractions)       Previous cardiac studies and procedures:  May 2022 coronary calcium score  Left main: 243  Left anterior descending (LAD): 86  Left circumflex: 0  Right coronary artery (RCA): 35  1.  Calcium score (Agatston): 359.     October 2022 exercise treadmill test: Normal    ASSESSMENT:   Diagnosis Plan   1. PVC's (premature ventricular contractions)        2. Primary hypertension        3. Mixed hyperlipidemia  Lipid Panel          PLAN:  Coronary artery calcification: Elevated coronary calcium score, predominantly coming from the left main and ostial to proximal LAD.  Age and gender based percentile in the 75-90th percentile range.  Afterload well controlled.  Previous potential intolerance to rosuvastatin and atorvastatin due to skin rash/eczema.  Zetia previously ineffective.  Intolerant to pitavastatin.  Goal LDL less than 100, ideally less than 70.  Recommending PCSK9 inhibitor.    Hypertension: Goal less than 130/80 mmHg.  Resolved after initiation of CPAP therapy for BRIAN.     PVCs: Not significant affecting quality of life.  Limited burden of arrhythmia.    Subjective  Interval assessment: Denies chest pain, palpitations or dyspnea.  Blood pressures running less than 130/80 mmHg.  Exercising on a regular basis without difficulty.    Initial evaluation: 62-year-old gentleman with a longstanding history of symptomatic PVCs, hypertension and dyslipidemia with recent elevated coronary calcium score.  Exercises on a regular basis without limitation.  Blood pressures running less than 130/80 mmHg.  Intermittent symptomatic PVCs which she has had for many years, improved with decreasing caffeine  "intake.  Not significantly affecting quality of life.    PHYSICAL EXAMINATION:  Vitals:    03/26/25 1256   BP: 122/88   BP Location: Right arm   Patient Position: Sitting   Cuff Size: Adult   Pulse: 87   SpO2: 95%   Weight: 104 kg (229 lb 3.2 oz)   Height: 193 cm (75.98\")     General Appearance:    Alert, cooperative, no distress, appears stated age   Head:    Normocephalic, without obvious abnormality, atraumatic   Eyes:    conjunctiva/corneas clear   Nose:   Nares normal, septum midline, mucosa normal, no drainage   Throat:   Lips, teeth and gums normal   Neck:   Supple, symmetrical, trachea midline, no carotid    bruit or JVD   Lungs:     Clear to auscultation bilaterally, respirations unlabored   Chest Wall:    No tenderness or deformity    Heart:    Regular rate and rhythm, S1 and S2 normal, no murmur, rub   or gallop, normal carotid impulse bilaterally without bruit.   Abdomen:     Soft, non-tender   Extremities:   Extremities normal, atraumatic, no cyanosis or edema   Pulses:   2+ and symmetric all extremities   Skin:   Skin color, texture, turgor normal, no rashes or lesions       Diagnostic Data:  Procedures  Lab Results   Component Value Date    CHLPL 207 (H) 03/12/2015    TRIG 150 05/31/2024    HDL 37 (L) 05/31/2024     Lab Results   Component Value Date    GLUCOSE 162 (H) 02/15/2025    BUN 12 05/31/2024    CREATININE 0.83 05/31/2024     09/26/2024    K 3.4 (L) 02/15/2025     02/15/2025    CO2 28.0 05/31/2024     Lab Results   Component Value Date    HGBA1C 6 (H) 09/25/2024     Lab Results   Component Value Date    WBC 9.24 02/15/2025    HGB 16.7 02/15/2025    HCT 49.2 02/15/2025     02/15/2025       Allergies  Allergies   Allergen Reactions    Lisinopril Cough    Latex Other (See Comments)    Oxycodone-Acetaminophen Anxiety and Unknown - Low Severity     acetaminophen / oxycodone    Percocet [Oxycodone-Acetaminophen] Anxiety       Current Medications    Current Outpatient Medications: "     ciclopirox (PENLAC) 8 % solution, Apply  topically to the appropriate area as directed Every Night., Disp: 6 mL, Rfl: 1    clobetasol (TEMOVATE) 0.05 % cream, Apply to affected areas in thin layer BID, Disp: 30 g, Rfl: 0    doxycycline (MONODOX) 100 MG capsule, Take 1 capsule by mouth 2 (Two) Times a Day., Disp: 14 capsule, Rfl: 0    lamoTRIgine (LaMICtal) 200 MG tablet, Take 1 tablet by mouth 2 (Two) Times a Day., Disp: 180 tablet, Rfl: 3    levETIRAcetam (KEPPRA) 1000 MG tablet, Take 1 tablet by mouth 2 (Two) Times a Day., Disp: 180 tablet, Rfl: 3    multivitamin (MULTI VITAMIN PO), Take  by mouth Daily., Disp: , Rfl:     Omega-3 Fatty Acids (FISH OIL PO), Take  by mouth., Disp: , Rfl:     sildenafil (REVATIO) 20 MG tablet, Take 5 tablets by mouth Daily As Needed (erectile dysfunction)., Disp: 120 tablet, Rfl: 1          ROS  ROS      SOCIAL HX  Social History     Socioeconomic History    Marital status:    Tobacco Use    Smoking status: Never     Passive exposure: Never    Smokeless tobacco: Never   Vaping Use    Vaping status: Never Used   Substance and Sexual Activity    Alcohol use: Not Currently    Drug use: Never    Sexual activity: Yes     Partners: Female     Birth control/protection: None     Comment: Single        FAMILY HX  Family History   Problem Relation Age of Onset    No Known Problems Mother     Heart disease Father     Kidney disease Father     Heart attack Father     Hypertension Father     Heart failure Father     Thyroid disease Sister     No Known Problems Brother     No Known Problems Maternal Grandmother     No Known Problems Maternal Grandfather     No Known Problems Paternal Grandmother     No Known Problems Paternal Grandfather     Heart disease Sister         Heart acts/pacemaker/bypass surgery             Bernardo Rice III, MD, FACC

## 2025-04-07 PROBLEM — R93.1 ELEVATED CORONARY ARTERY CALCIUM SCORE: Status: ACTIVE | Noted: 2025-04-07

## 2025-04-22 NOTE — PROGRESS NOTES
Sleep Clinic Video Visit Follow Up Note    The patient is located at their home address in [place], [place]. The patient presents today for telehealth service.  This service was conducted via audio/video technology through a secure [employer] video visit connection through Epic.  This provider is located in [facility].  Patient stated they are in a secure environment for the session.  Patient's condition being diagnosed/treated is appropriate for telemedicine.  The provider identified himself as well as his credentials.  The patient, and/or patient's guardian, consent to be seen remotely, and when consent is given they understanding that the consent allows for patient identifiable information to be sent to a third-party as needed.  They may refuse to be seen remotely at any time.  The electronic data is encrypted and password protected, and the patient and/or guardian has been advised of the potential risk to privacy not withstanding such measures.  Patient identifiers used: Name and date of birth.     You have chosen to receive care through a telehealth visit.  Do you consent to use a video connection for your medical care today? Yes     Mode of Visit: Video  Location of patient: -HOME-  Location of provider: [facility]  You have chosen to receive care through a telehealth visit.  The patient has signed the video visit consent form.  The visit included audio and video interaction. No technical issues occurred during this visit.      Chief Complaint  Follow-up and compliance of PAP therapy    Subjective     History of Present Illness (from previous encounter on 4/26/24):  Randy Asif is a 64 y.o. male who returns for follow-up and compliance of PAP therapy.  The pap report has been reviewed.  Overall usage is at 100% with compliance of 88%.  Patient averages 6 hours and 51 minutes of therapy.  Sleep apnea is well-controlled with an AHI of 1.8/H. I will refill the patient's supplies, and they will  return for follow-up and compliance in 1 year or sooner should they have further questions or concerns. (End copied text)    Interval History:  Randy Asif is a 65 y.o. male returns for follow up and compliance of PAP therapy. The patient was last seen on 4/26/24. Overall the patient feels good/poor with regard to therapy. The device appears to be working appropriately. On average the patient sleeps 8 hours per night. The patient wake 2-3 times per night. He recently had a mild seizure.     The patient reports the following changes to their medical and medication history since they were last seen:  Off all BP medication      Further details are as follows:    [place] Scale is: 7/24      Weight:  Current Weight: 230 lb      The patient's relevant past medical, surgical, family, and social history reviewed and updated in Epic as appropriate.    PMH:    Past Medical History:   Diagnosis Date    Abnormal finding on MRI of brain     Alcoholism One year    Asthmatic bronchitis     Cancer Randy Asif    Elevated glucose     Hyperlipidemia 2/5/2022    Hypertension 2/5/2018    Seizures     Shingles     Sleep apnea 2022    On a c-pap machine now     Past Surgical History:   Procedure Laterality Date    BRAIN TUMOR EXCISION  1981    COLONOSCOPY      complete 2011  repeat 10 yrs    COLONOSCOPY  2022       Allergies   Allergen Reactions    Lisinopril Cough    Latex Other (See Comments)    Oxycodone-Acetaminophen Anxiety and Unknown - Low Severity     acetaminophen / oxycodone    Percocet [Oxycodone-Acetaminophen] Anxiety       MEDS:  Prior to Admission medications    Medication Sig Start Date End Date Taking? Authorizing Provider   ciclopirox (PENLAC) 8 % solution Apply  topically to the appropriate area as directed Every Night. 5/29/24   Joe Pearce,    clobetasol (TEMOVATE) 0.05 % cream Apply to affected areas in thin layer BID 9/13/22   Navya Mora APRN   doxycycline (MONODOX) 100 MG capsule Take 1  "capsule by mouth 2 (Two) Times a Day. 3/6/25   Wolf Montgomery DO   lamoTRIgine (LaMICtal) 200 MG tablet Take 1 tablet by mouth 2 (Two) Times a Day. 1/3/25   Jp Bautista MD   levETIRAcetam (KEPPRA) 1000 MG tablet Take 1 tablet by mouth 2 (Two) Times a Day. 2/18/25   Jp Bautista MD   multivitamin (MULTI VITAMIN PO) Take  by mouth Daily.    Provider, MD Logan   Omega-3 Fatty Acids (FISH OIL PO) Take  by mouth.    Provider, MD Logan   sildenafil (REVATIO) 20 MG tablet Take 5 tablets by mouth Daily As Needed (erectile dysfunction). 5/29/24   Joe Pearce DO         FH:  Family History   Problem Relation Age of Onset    No Known Problems Mother     Heart disease Father     Kidney disease Father     Heart attack Father     Hypertension Father     Heart failure Father     Thyroid disease Sister     No Known Problems Brother     No Known Problems Maternal Grandmother     No Known Problems Maternal Grandfather     No Known Problems Paternal Grandmother     No Known Problems Paternal Grandfather     Heart disease Sister         Heart acts/pacemaker/bypass surgery       Objective   Vital Signs:  Ht 193 cm (75.98\")   Wt 104 kg (230 lb)   BMI 28.01 kg/m²     Patient's (Body mass index is 28.01 kg/m².) indicates that they are overweight (BMI 25-29.9)            Physical Exam  Constitutional:       Appearance: Normal appearance.   Neurological:      Mental Status: He is alert and oriented to person, place, and time.   Psychiatric:         Mood and Affect: Mood normal.         Behavior: Behavior normal.         Thought Content: Thought content normal.         Judgment: Judgment normal.               Result Review :           PAP Report:  AHI: 0.7/h  Days of Usage: 87/90 (97%)  Number of Days Greater than 4 hours: 97%  Average time (days used): 9 hours 2 minutes  95th Percentile Pressure: 11.2 cmH2O  95th percentile leaks: 2.3 L/min  Settings: Auto CPAP-5/20 cm H2O, EPR off, response standard.     "   Assessment and Plan  Randy Asif is a 65 y.o. male who returns for follow-up and compliance of PAP therapy.  The pap report has been reviewed.  Overall usage and compliance are at 97%.  The patient averages 9 hours and 2 minutes of therapy.  Sleep apnea is well-controlled with an AHI of 0.7/h. I will refill the patient's supplies, and they will return for follow-up and compliance in 1 year or sooner should they have further questions or concerns.      Diagnoses and all orders for this visit:    1. BRIAN (obstructive sleep apnea) (Primary)  -     PAP Therapy    2. Overweight (BMI 25.0-29.9)         The patient continues to use and benefit from PAP therapy.    1. The patient was counseled regarding multimodal approach with healthy nutrition, healthy sleep, regular physical activity, social activities, counseling, and medications. Encouraged to practice lateral sleep position. Avoid alcohol and sedatives close to bedtime.     2.  We will refill supplies x1 year.  Return to clinic 1 year or sooner if symptoms warrant.  Patient gave verbal consent today for video visit. I have reviewed the results of my evaluation and impression and discussed my recommendations in detail with the patient.         Follow Up  Return in about 1 year (around 4/25/2026) for Annual visit, Video visit.  Patient was given instructions and counseling regarding his condition or for health maintenance advice. Please see specific information pulled into the AVS if appropriate.       TANYA Reyes, ACNP-BC  Pulmonology, Critical Care, and Sleep Medicine

## 2025-04-25 ENCOUNTER — TELEMEDICINE (OUTPATIENT)
Dept: SLEEP MEDICINE | Age: 65
End: 2025-04-25
Payer: COMMERCIAL

## 2025-04-25 VITALS — HEIGHT: 76 IN | WEIGHT: 230 LBS | BODY MASS INDEX: 28.01 KG/M2

## 2025-04-25 DIAGNOSIS — G47.33 OSA (OBSTRUCTIVE SLEEP APNEA): Primary | ICD-10-CM

## 2025-04-25 DIAGNOSIS — E66.3 OVERWEIGHT (BMI 25.0-29.9): ICD-10-CM

## 2025-05-14 ENCOUNTER — SPECIALTY PHARMACY (OUTPATIENT)
Facility: HOSPITAL | Age: 65
End: 2025-05-14
Payer: COMMERCIAL

## 2025-05-14 ENCOUNTER — LAB (OUTPATIENT)
Dept: LAB | Facility: HOSPITAL | Age: 65
End: 2025-05-14
Payer: COMMERCIAL

## 2025-05-14 ENCOUNTER — TELEPHONE (OUTPATIENT)
Dept: FAMILY MEDICINE CLINIC | Facility: CLINIC | Age: 65
End: 2025-05-14
Payer: COMMERCIAL

## 2025-05-14 DIAGNOSIS — E78.2 MIXED HYPERLIPIDEMIA: Primary | Chronic | ICD-10-CM

## 2025-05-14 DIAGNOSIS — E78.2 MIXED HYPERLIPIDEMIA: Chronic | ICD-10-CM

## 2025-05-14 LAB
CHOLEST SERPL-MCNC: 225 MG/DL (ref 0–200)
HDLC SERPL-MCNC: 33 MG/DL (ref 40–60)
LDLC SERPL CALC-MCNC: 169 MG/DL (ref 0–100)
LDLC/HDLC SERPL: 5.06 {RATIO}
TRIGL SERPL-MCNC: 125 MG/DL (ref 0–150)
VLDLC SERPL-MCNC: 23 MG/DL (ref 5–40)

## 2025-05-14 PROCEDURE — 36415 COLL VENOUS BLD VENIPUNCTURE: CPT

## 2025-05-14 PROCEDURE — 80061 LIPID PANEL: CPT

## 2025-05-14 NOTE — TELEPHONE ENCOUNTER
Caller: Randy Asif Dale    Relationship: Self    Best call back number: [phone]     What orders are you requesting (i.e. lab or imaging): BLOOD WORK    In what timeframe would the patient need to come in: AS SOON AS POSSIBLE    Where will you receive your lab/imaging services: IN OFFICE    Additional notes: PATIENT STATES HIS CARDIOLOGIST WANTS HIM TO HAVE A FASTING CHOLESTEROL LEVEL DONE AND HE WOULD LIKE TO SEE IF DR. JACK WOULD ORDER THESE LABS FOR HIM.PLEASE CALL PATIENT WHEN LABS ARE ENTERED.

## 2025-05-14 NOTE — PROGRESS NOTES
Specialty Pharmacy Patient Management Program  One-Time Clinical Outreach     Randy Chito Asif is a 65 y.o. male seen by a Cardiology provider for Hyperlipidemia and enrolled in the Cardiology Patient Management program offered by [facility].      Patient's insurance requires a recent lipid panel to complete prior authorization for PCSK9 medication. Lab was unable to use current active order in chart so a new order was placed and he will have drawn today while fasting.     Maite Quintana, PharmD, [facility]  Clinical Specialty Pharmacist, Cardiology  5/14/2025  08:33 EDT

## 2025-05-22 ENCOUNTER — SPECIALTY PHARMACY (OUTPATIENT)
Facility: HOSPITAL | Age: 65
End: 2025-05-22
Payer: COMMERCIAL

## 2025-05-22 NOTE — PROGRESS NOTES
Specialty Pharmacy Patient Management Program  Cardiology Initial Assessment     Randy Asif was referred by a Cardiology provider to the Cardiology Patient Management program offered by [facility] for Hyperlipidemia on 05/22/25.  An initial outreach was conducted, including assessment of therapy appropriateness and specialty medication education for Repatha. The patient was introduced to services offered by [facility], including: regular assessments, refill coordination, mail order delivery options, prior authorization maintenance, and financial assistance programs as applicable. The patient was also provided with contact information for the pharmacy team.     Insurance Coverage & Financial Support  [employer] and Free Trial Card     Relevant Past Medical History and Comorbidities  Relevant medical history and concomitant health conditions were discussed with the patient. The patient's chart has been reviewed for relevant past medical history and comorbid conditions and updated as necessary.  Past Medical History:   Diagnosis Date    Abnormal finding on MRI of brain     Alcoholism One year    Asthmatic bronchitis     Cancer Randy Asif    Elevated glucose     Hyperlipidemia 2/5/2022    Hypertension 2/5/2018    Seizures     Shingles     Sleep apnea 2022    On a c-pap machine now     Social History     Socioeconomic History    Marital status:    Tobacco Use    Smoking status: Never     Passive exposure: Never    Smokeless tobacco: Never   Vaping Use    Vaping status: Never Used   Substance and Sexual Activity    Alcohol use: Not Currently    Drug use: Never    Sexual activity: Yes     Partners: Female     Birth control/protection: None     Comment: Single        Problem list reviewed by Maite Quintana, PharmD on 5/22/2025 at 10:23 AM    Allergies  Known allergies and reactions were discussed with the patient. The patient's chart has been reviewed  for  allergy information and updated as necessary.   Allergies   Allergen Reactions    Lisinopril Cough    Latex Other (See Comments)    Oxycodone-Acetaminophen Anxiety and Unknown - Low Severity     acetaminophen / oxycodone    Percocet [Oxycodone-Acetaminophen] Anxiety       Allergies reviewed by Maite Quintana, PharmD on 5/22/2025 at 10:22 AM    Relevant Laboratory Values  Relevant laboratory values were discussed with the patient. The following specialty medication dose adjustment(s) are recommended: Begin Repatha for lipid lowering    Lab Results   Component Value Date    GLUCOSE 162 (H) 02/15/2025    CALCIUM 9.3 05/31/2024     09/26/2024    K 3.4 (L) 02/15/2025    CO2 28.0 05/31/2024     02/15/2025    BUN 12 05/31/2024    CREATININE 0.83 05/31/2024    EGFRIFAFRI 114 03/12/2015    EGFRIFNONA 88 04/16/2021    BCR 14.5 05/31/2024    ANIONGAP 8.0 05/31/2024     Lab Results   Component Value Date    CHOL 225 (H) 05/14/2025    CHLPL 207 (H) 03/12/2015    TRIG 125 05/14/2025    HDL 33 (L) 05/14/2025     (H) 05/14/2025         Current Medication List  This medication list has been reviewed with the patient and evaluated for any interactions or necessary modifications/recommendations, and updated to include all prescription medications, OTC medications, and supplements the patient is currently taking.  This list reflects what is contained in the patient's profile, which has also been marked as reviewed to communicate to other providers it is the most up to date version of the patient's current medication therapy.     Current Outpatient Medications:     ciclopirox (PENLAC) 8 % solution, Apply  topically to the appropriate area as directed Every Night., Disp: 6 mL, Rfl: 1    clobetasol (TEMOVATE) 0.05 % cream, Apply to affected areas in thin layer BID, Disp: 30 g, Rfl: 0    Evolocumab (REPATHA) solution auto-injector SureClick injection, Inject 1 mL under the skin into the appropriate area as  directed Every 14 (Fourteen) Days., Disp: 2 mL, Rfl: 11    lamoTRIgine (LaMICtal) 200 MG tablet, Take 1 tablet by mouth 2 (Two) Times a Day., Disp: 180 tablet, Rfl: 3    levETIRAcetam (KEPPRA) 1000 MG tablet, Take 1 tablet by mouth 2 (Two) Times a Day., Disp: 180 tablet, Rfl: 3    multivitamin (MULTI VITAMIN PO), Take  by mouth Daily., Disp: , Rfl:     Omega-3 Fatty Acids (FISH OIL PO), Take  by mouth., Disp: , Rfl:     sildenafil (REVATIO) 20 MG tablet, Take 5 tablets by mouth Daily As Needed (erectile dysfunction)., Disp: 120 tablet, Rfl: 1    Medicines reviewed by Maite Quintana, PharmD on 5/22/2025 at 10:22 AM    Drug Interactions  None    Initial Education Provided for Specialty Medication  The patient has been provided with the following education and any applicable administration techniques (i.e. self-injection) have been demonstrated for the therapies indicated. All questions and concerns have been addressed prior to the patient receiving the medication, and the patient has verbalized comprehension of the education and any materials provided. Additional patient education shall be provided and documented upon request by the patient, provider, or payer.    REPATHA® (evolocumab)  Medication Expectations   Why am I taking this medication? You are taking Repatha to lower your “bad” cholesterol (LDL-C). This medication can be used in adults with high blood cholesterol including primary hyperlipidemia and familial hypercholesterolemia.    What should I expect while on this medication? You should expect to see your cholesterol improve over time. Specifically, you should see your LDL-C decrease.    How does the medication work? Repatha works by blocking a protein called PCSK9 that contributes to high levels of bad cholesterol. It helps increase your liver's ability to remove bad cholesterol from your blood.     How long will I be on this medication for? The amount of time you will be on this medication will be  determined by your doctor based on your cholesterol and/or your risk of having a cardiac event. You will most likely be on this medication or another cholesterol medication throughout your lifetime. Do not abruptly stop this medication without talking to your doctor first.    How do I take this medication? Take as directed on your prescription label. Repatha is injected under the skin (subcutaneously) of your stomach, thigh, or upper arm. This medication is usually given one or twice a month.   What are some possible side effects? The most common side effects of Repatha include redness, itching, swelling, or pain/tenderness at the injection site, symptoms of the common cold, flu or flu-like symptoms or back pain.    What happens if I miss a dose? If you miss a dose, take it as soon as you remember if it is within 7 days from the usual day of administration then resume your original schedule. If it is beyond 7 days and you use the corry-2-week dose, skip the missed dose and resume your normal dosing schedule.If it is beyond 7 days and you use the once-monthly dose, inject the dose and start a new schedule based on that date.      Medication Safety   What are things I should warn my doctor immediately about? Talk to your doctor if you are pregnant, planning to become pregnant, or breastfeeding. Stop the medication and tell your doctor or seek emergency medical help if you notice any signs/symptoms of an allergic reaction (severe rash, redness, hives, severe itching, trouble breathing, or swelling of the face, lips, or tongue). If you have a rubber or latex allergy, you should not use the Repatha SureClick® Autoinjector pen or the prefilled syringe, please notify your doctor or pharmacist.   What are things that I should be cautious of? Be cautious of any side effects from this medication. Talk to your doctor if any new ones develop or aren't getting better.   What are some medications that can interact with this one?  There are no known significant drug interactions with Repatha. Always tell your doctor or pharmacist immediately if you start taking any new medications, including over-the-counter medications, vitamins, and herbal supplements.      Medication Storage/Handling   How should I handle this medication? Do not shake or expose the pens, cartridges, or syringes to extreme heat or direct sunlight. Keep this medication out of reach of pets/children. Allow medication to warm at room temperature prior to administration.   How does this medication need to be stored? Store unused pens, cartridges, or syringes in the refrigerator in the original cartons to protect from light. If needed, Repatha may be kept at room temperature in the original carton for up to 30 days. Do not freeze.    How should I dispose of this medication? All the Repatha devices are single-dose and should be discarded in a sharps container after use. If your doctor decides to stop this medication, take to your local police station for proper disposal. Some pharmacies also have take-back bins for medication drop-off.      Resources/Support   How can I remind myself to take this medication? You can download reminder apps to help you manage your refills. You may also set an alarm on your phone to remind you to take your dose.    Is financial support available?  [employer] can provide co-pay cards if you have commercial insurance or patient assistance if you have Medicare or no insurance.    Which vaccines are recommended for me? Talk to your doctor about these vaccines: Flu, Coronavirus (COVID-19), Pneumococcal (pneumonia), Tdap, Hepatitis B, Zoster (shingles)          Adherence and Self-Administration  Adherence related to the patient's specialty therapy was discussed with the patient. The Adherence segment of this outreach has been reviewed and updated.     Is there a concern with patient's ability to self administer the medication correctly and without issue?:  No  Were any potential barriers to adherence identified during the initial assessment or patient education?: No  Are there any concerns regarding the patient's understanding of the importance of medication adherence?: No  Methods for Supporting Patient Adherence and/or Self-Administration: None    Open Medication Therapy Problems  No medication therapy recommendations to display    Goals of Therapy  Goals related to the patient's specialty therapy were discussed with the patient. The Patient Goals segment of this outreach has been reviewed and updated.   Goals Addressed Today        Specialty Pharmacy General Goal      LDL Goal < 100 mg/dL    Lab Results   Component Value Date     (H) 05/14/2025     (H) 05/31/2024     (H) 07/27/2023     (H) 06/20/2022     (H) 04/08/2022                  Reassessment Plan & Follow-Up  1. Medication Therapy Changes: Begin Repatha 140mg subcutaneous every 14 days   2. Related Plans, Therapy Recommendations, or Therapy Problems to Be Addressed: None  3. Pharmacist to perform regular assessments no more than (6) months from the previous assessment.  4. Care Coordinator to set up future refill outreaches, coordinate prescription delivery, and escalate clinical questions to pharmacist.  5. Welcome information and patient satisfaction survey to be sent by specialty pharmacy team with patient's initial fill.    Attestation  Therapeutic appropriateness: Appropriate   I attest the patient was actively involved in and has agreed to the above plan of care. If the prescribed therapy is at any point deemed not appropriate based on the current or future assessments, a consultation will be initiated with the patient's specialty care provider to determine the best course of action. The revised plan of therapy will be documented along with any required assessments and/or additional patient education provided.     Maite Quintana, PharmD, [facility]  Clinical Specialty  Pharmacist, Cardiology  5/22/2025  10:30 EDT

## 2025-06-11 ENCOUNTER — SPECIALTY PHARMACY (OUTPATIENT)
Dept: CARDIOLOGY | Facility: CLINIC | Age: 65
End: 2025-06-11
Payer: COMMERCIAL

## 2025-06-11 ENCOUNTER — OFFICE VISIT (OUTPATIENT)
Dept: FAMILY MEDICINE CLINIC | Facility: CLINIC | Age: 65
End: 2025-06-11
Payer: COMMERCIAL

## 2025-06-11 ENCOUNTER — TELEPHONE (OUTPATIENT)
Dept: CARDIOLOGY | Facility: CLINIC | Age: 65
End: 2025-06-11
Payer: COMMERCIAL

## 2025-06-11 VITALS
DIASTOLIC BLOOD PRESSURE: 80 MMHG | SYSTOLIC BLOOD PRESSURE: 136 MMHG | OXYGEN SATURATION: 96 % | BODY MASS INDEX: 32.15 KG/M2 | WEIGHT: 264 LBS | HEART RATE: 74 BPM | HEIGHT: 76 IN | TEMPERATURE: 98.4 F

## 2025-06-11 DIAGNOSIS — E78.2 MIXED HYPERLIPIDEMIA: Primary | ICD-10-CM

## 2025-06-11 DIAGNOSIS — I49.3 PVC'S (PREMATURE VENTRICULAR CONTRACTIONS): Chronic | ICD-10-CM

## 2025-06-11 DIAGNOSIS — G40.209 COMPLEX PARTIAL EPILEPSY: Chronic | ICD-10-CM

## 2025-06-11 PROBLEM — G40.909 EPILEPSY: Status: ACTIVE | Noted: 2024-09-25

## 2025-06-11 PROBLEM — I67.82 ISCHEMIC BRAIN DAMAGE: Status: ACTIVE | Noted: 2024-09-27

## 2025-06-11 PROCEDURE — 99214 OFFICE O/P EST MOD 30 MIN: CPT | Performed by: FAMILY MEDICINE

## 2025-06-11 NOTE — PROGRESS NOTES
Follow Up Office Visit      Date: 2025   Patient Name: Randy Asif  : 1960   MRN: [id number]     Chief Complaint:    Chief Complaint   Patient presents with    Hyperlipidemia       History of Present Illness: Randy Asif is a 65 y.o. male who presents today for follow-up.    Had reported having a seizure on 2/15/2025.  Had reported neurology tried to lower Keppra dose.  At last visit reported was back to previous dose.  Can have some side effects such as slight dizziness but symptoms were improving as of last visit  Follows with Dr. Simmons for neurology    Follows with cardiology, Dr. Rice and is prescribed Repatha      Side effects doing some better now.  Reports just after taking has some side effects.  Then goes away.  Taking Keppra and lamictal now.  Follows with Dr. Bautista for Neurology.            Subjective      Review of Systems:   Review of Systems   Constitutional:  Negative for chills, diaphoresis and fatigue.   HENT:  Negative for congestion, sore throat and swollen glands.    Respiratory:  Negative for cough.    Cardiovascular:  Negative for chest pain.   Gastrointestinal:  Negative for abdominal pain, nausea and vomiting.   Genitourinary:  Negative for dysuria.   Musculoskeletal:  Negative for myalgias and neck pain.   Skin:  Negative for rash.   Neurological:  Negative for weakness and numbness.       I have reviewed the patients family history, social history, past medical history, past surgical history and have updated it as appropriate.     Medications:     Current Outpatient Medications:     ciclopirox (PENLAC) 8 % solution, Apply  topically to the appropriate area as directed Every Night., Disp: 6 mL, Rfl: 1    clobetasol (TEMOVATE) 0.05 % cream, Apply to affected areas in thin layer BID, Disp: 30 g, Rfl: 0    Evolocumab (REPATHA) solution auto-injector SureClick injection, Inject 1 mL under the skin into the appropriate area as directed Every 14 (Fourteen) Days., Disp:  "2 mL, Rfl: 11    lamoTRIgine (LaMICtal) 200 MG tablet, Take 1 tablet by mouth 2 (Two) Times a Day., Disp: 180 tablet, Rfl: 3    levETIRAcetam (KEPPRA) 1000 MG tablet, Take 1 tablet by mouth 2 (Two) Times a Day., Disp: 180 tablet, Rfl: 3    multivitamin (MULTI VITAMIN PO), Take  by mouth Daily., Disp: , Rfl:     Omega-3 Fatty Acids (FISH OIL PO), Take  by mouth., Disp: , Rfl:     sildenafil (REVATIO) 20 MG tablet, Take 5 tablets by mouth Daily As Needed (erectile dysfunction)., Disp: 120 tablet, Rfl: 1    Allergies:   Allergies   Allergen Reactions    Lisinopril Cough    Latex Other (See Comments)    Oxycodone-Acetaminophen Anxiety and Unknown - Low Severity     acetaminophen / oxycodone    Percocet [Oxycodone-Acetaminophen] Anxiety       Objective     Physical Exam: Please see above  Vital Signs:   Vitals:    06/11/25 1138   BP: 136/80   Pulse: 74   Temp: 98.4 °F (36.9 °C)   TempSrc: Infrared   SpO2: 96%   Weight: 120 kg (264 lb)   Height: 193 cm (75.98\")   PainSc: 0-No pain     Body mass index is 32.15 kg/m².  BMI is >= 25 and <30. (Overweight) The following options were offered after discussion;: exercise counseling/recommendations and nutrition counseling/recommendations       Physical Exam    Procedures    Results:   Labs:   Hemoglobin A1C   Date Value Ref Range Status   09/25/2024 6 (H) <5.7 % Final     TSH   Date Value Ref Range Status   05/31/2024 0.751 0.270 - 4.200 uIU/mL Final        POCT Results (if applicable):   Results for orders placed or performed in visit on 05/14/25   Lipid Panel    Collection Time: 05/14/25  8:57 AM    Specimen: Blood   Result Value Ref Range    Total Cholesterol 225 (H) 0 - 200 mg/dL    Triglycerides 125 0 - 150 mg/dL    HDL Cholesterol 33 (L) 40 - 60 mg/dL    LDL Cholesterol  169 (H) 0 - 100 mg/dL    VLDL Cholesterol 23 5 - 40 mg/dL    LDL/HDL Ratio 5.06        PHQ-9: PHQ-9 Total Score:       Imaging:   No valid procedures specified.     Measures:   Advanced Care Planning: "   Patient does not have an advance directive, declines further assistance.    Smoking Cessation:   Does not smoke    Assessment / Plan      Assessment/Plan:     1. Mixed hyperlipidemia  1-2 patient to continue Repatha, and continue to follow with cardiology.  Plan to check lipid panel in the future.  Encouraged patient to follow low-fat diet and exercise as tolerated    2. PVC's (premature ventricular contractions)  As above.    3. Complex partial epilepsy  Patient to continue to follow with neurology.  Currently prescribed Keppra, and Lamictal.        Part of this note may be an electronic transcription/translation of spoken language to printed text using the Dragon Dictation System.        Vaccine Counseling:      Follow Up:   Return in about 6 months (around 12/11/2025) for Annual.      DO CHEO Cabrera

## 2025-06-11 NOTE — PATIENT INSTRUCTIONS
Take medications as ordered.  Low fat, low salt diet.  Exercise as tolerated.   Continue Repatha.  Continue to follow with Cardiology and Neurology.

## 2025-06-11 NOTE — PROGRESS NOTES
Specialty Pharmacy Patient Management Program  Cardiology Specialty Pharmacy Refill Outreach      Randy is a 65 y.o. male contacted today regarding refills of his medication(s).    Specialty medication(s) and dose(s) confirmed: Repatha  Other medications being refilled: n/a    Refill Questions      Flowsheet Row Most Recent Value   Changes to allergies? No   Changes to medications? No   New conditions or infections since last clinic visit No   Unplanned office visit, urgent care, ED, or hospital admission in the last 4 weeks  No   How does patient/caregiver feel medication is working? Very good   Financial problems or insurance changes  No   Since the previous refill, were any specialty medication doses or scheduled injections missed or delayed?  No   Does this patient require a clinical escalation to a pharmacist? No            Delivery Questions      Flowsheet Row Most Recent Value   Delivery method UPS   Delivery address verified with patient/caregiver? Yes   Delivery address Home   Number of medications in delivery 1   Medication(s) being filled and delivered Evolocumab (REPATHA)   Doses left of specialty medications 0   Copay verified? Yes   Copay amount $15   Copay form of payment Credit/debit on file   Delivery Date Selection 06/13/25   Signature Required No   Do you consent to receive electronic handouts?  No                   Follow-up: 21 days     Osman Weiner CPhT-Adv  Pharmacy Care Coordinator  6/11/2025  14:48 EDT

## 2025-07-09 ENCOUNTER — OFFICE VISIT (OUTPATIENT)
Dept: NEUROLOGY | Facility: CLINIC | Age: 65
End: 2025-07-09
Payer: COMMERCIAL

## 2025-07-09 VITALS
DIASTOLIC BLOOD PRESSURE: 72 MMHG | HEART RATE: 74 BPM | HEIGHT: 76 IN | WEIGHT: 232 LBS | OXYGEN SATURATION: 95 % | BODY MASS INDEX: 28.25 KG/M2 | SYSTOLIC BLOOD PRESSURE: 108 MMHG

## 2025-07-09 DIAGNOSIS — G40.209 COMPLEX PARTIAL EPILEPSY: Primary | Chronic | ICD-10-CM

## 2025-07-09 PROCEDURE — 99213 OFFICE O/P EST LOW 20 MIN: CPT | Performed by: PSYCHIATRY & NEUROLOGY

## 2025-07-09 RX ORDER — LAMOTRIGINE 200 MG/1
200 TABLET ORAL 2 TIMES DAILY
Qty: 180 TABLET | Refills: 3 | Status: SHIPPED | OUTPATIENT
Start: 2025-07-09

## 2025-07-09 RX ORDER — LEVETIRACETAM 1000 MG/1
1000 TABLET ORAL 2 TIMES DAILY
Qty: 180 TABLET | Refills: 3 | Status: SHIPPED | OUTPATIENT
Start: 2025-07-09

## 2025-07-09 NOTE — PROGRESS NOTES
"Chief Complaint    SZ    Subjective        Randy JAILENE Asif presents to [facility] NEUROLOGY  History of Present Illness    65 y.o. male returns in follow up.  Last visit on 1/3/25 continued LTG, LVT. .     MRI Brain, 1/21/25  s/p craniotomy L parietal with mild T2 signal changes.     Admitted [facility] 9/25/24 - 9/27/24.  Continued LTG and added LVT.     EEG Two electrographic seizures in left centro-temporal-parietal region, left LPDs, left centro-parietal sharps      9/25/24 breakthrough sz.after missing multiple dosage.      Reviewed medical records:     Previously followed by Dr Carrera for GTC sz.       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  3 GTC sz after surgery and on PHT for 4 years.  Off AED for 35 years then sz 7/31/15.     Aura: aphasia, right sided numbness, visual sx.  Sz d/o last episode 10 yrs ago.       AED:  LTG, OXC, PHT, LVT  Objective   Vital Signs:  /72   Pulse 74   Ht 193 cm (75.98\")   Wt 105 kg (232 lb)   SpO2 95%   BMI 28.25 kg/m²   Estimated body mass index is 28.25 kg/m² as calculated from the following:    Height as of this encounter: 193 cm (75.98\").    Weight as of this encounter: 105 kg (232 lb).    Neurological Exam  Mental Status  Awake, alert and oriented to person, place and time. Oriented to person, place and time. Speech is normal. Language is fluent with no aphasia. Attention and concentration are normal. Fund of knowledge is appropriate for level of education.    Cranial Nerves  CN III, IV, VI: Extraocular movements intact bilaterally. Pupils equal round and reactive to light bilaterally.  CN V: Facial sensation is normal.  CN VII: Full and symmetric facial movement.  CN IX, X: Palate elevates symmetrically  CN XI: Shoulder shrug strength is normal.  CN XII: Tongue midline without atrophy or fasciculations.    Motor   Strength is 5/5 throughout all four extremities.    Sensory  Sensation is intact to light touch, pinprick, " vibration and proprioception in all four extremities.    Reflexes  Deep tendon reflexes are 2+ and symmetric in all four extremities.    Coordination    Finger-to-nose, rapid alternating movements and heel-to-shin normal bilaterally without dysmetria.    Gait  Normal casual, toe, heel and tandem gait.         Physical Exam  Eyes:      Extraocular Movements: Extraocular movements intact.      Pupils: Pupils are equal, round, and reactive to light.   Neurological:      Motor: Motor strength is normal.     Coordination: Coordination is intact.      Deep Tendon Reflexes: Reflexes are normal and symmetric.   Psychiatric:         Speech: Speech normal.        Result Review :  The following data was reviewed by: Jp Bautista MD on 07/09/2025:  Common labs          9/27/2024    02:21 2/15/2025    19:29 5/14/2025    08:57   Common Labs   Glucose  162        Potassium  3.4        Chloride  100        WBC 8.58     9.24        Hemoglobin 14.8     16.7        Hematocrit 44.6     49.2        Platelets 214     226        Total Cholesterol   225    Triglycerides   125    HDL Cholesterol   33    LDL Cholesterol    169       Details          This result is from an external source.                       Assessment and Plan   Diagnoses and all orders for this visit:    1. Complex partial epilepsy (Primary)  Assessment & Plan:  Sz free    Continue Lamictal 200 mg BID  Keppra 1000 mg BID       Other orders  -     levETIRAcetam (KEPPRA) 1000 MG tablet; Take 1 tablet by mouth 2 (Two) Times a Day.  Dispense: 180 tablet; Refill: 3  -     lamoTRIgine (LaMICtal) 200 MG tablet; Take 1 tablet by mouth 2 (Two) Times a Day.  Dispense: 180 tablet; Refill: 3             Follow Up   No follow-ups on file.  Patient was given instructions and counseling regarding his condition or for health maintenance advice. Please see specific information pulled into the AVS if appropriate.

## 2025-07-10 ENCOUNTER — SPECIALTY PHARMACY (OUTPATIENT)
Dept: CARDIOLOGY | Facility: CLINIC | Age: 65
End: 2025-07-10
Payer: COMMERCIAL

## 2025-07-10 ENCOUNTER — TELEPHONE (OUTPATIENT)
Dept: CARDIOLOGY | Facility: CLINIC | Age: 65
End: 2025-07-10
Payer: COMMERCIAL

## 2025-07-10 NOTE — PROGRESS NOTES
Specialty Pharmacy Patient Management Program  Cardiology Specialty Pharmacy Refill Outreach      Randy is a 65 y.o. male contacted today regarding refills of his medication(s).    Specialty medication(s) and dose(s) confirmed: Repatha  Other medications being refilled: N/A    Refill Questions      Flowsheet Row Most Recent Value   Changes to allergies? No   Changes to medications? No   New conditions or infections since last clinic visit No   Unplanned office visit, urgent care, ED, or hospital admission in the last 4 weeks  No   How does patient/caregiver feel medication is working? Very good   Financial problems or insurance changes  No   Since the previous refill, were any specialty medication doses or scheduled injections missed or delayed?  No   Does this patient require a clinical escalation to a pharmacist? No            Delivery Questions      Flowsheet Row Most Recent Value   Delivery method UPS   Delivery address verified with patient/caregiver? Yes   Delivery address Home   Number of medications in delivery 1   Medication(s) being filled and delivered Evolocumab (REPATHA)   Doses left of specialty medications 0   Copay verified? Yes   Copay amount $15.00   Copay form of payment Credit/debit on file   Delivery Date Selection 07/11/25   Signature Required No   Do you consent to receive electronic handouts?  No                   Follow-up: 21 days     Ana Paula Uribe CPhT  Pharmacy Care Coordinator  7/10/2025  12:57 EDT

## 2025-08-05 ENCOUNTER — SPECIALTY PHARMACY (OUTPATIENT)
Dept: CARDIOLOGY | Facility: CLINIC | Age: 65
End: 2025-08-05
Payer: COMMERCIAL

## 2025-08-05 ENCOUNTER — HOSPITAL ENCOUNTER (EMERGENCY)
Facility: HOSPITAL | Age: 65
Discharge: HOME | End: 2025-08-05
Attending: STUDENT IN AN ORGANIZED HEALTH CARE EDUCATION/TRAINING PROGRAM | Admitting: STUDENT IN AN ORGANIZED HEALTH CARE EDUCATION/TRAINING PROGRAM
Payer: COMMERCIAL

## 2025-08-05 ENCOUNTER — TELEPHONE (OUTPATIENT)
Facility: HOSPITAL | Age: 65
End: 2025-08-05
Payer: COMMERCIAL

## 2025-08-05 ENCOUNTER — APPOINTMENT (OUTPATIENT)
Facility: HOSPITAL | Age: 65
End: 2025-08-05
Payer: COMMERCIAL

## 2025-08-05 VITALS
BODY MASS INDEX: 35.61 KG/M2 | WEIGHT: 235 LBS | HEIGHT: 68 IN | HEART RATE: 82 BPM | TEMPERATURE: 98.8 F | RESPIRATION RATE: 16 BRPM | DIASTOLIC BLOOD PRESSURE: 93 MMHG | SYSTOLIC BLOOD PRESSURE: 134 MMHG | OXYGEN SATURATION: 95 %

## 2025-08-05 DIAGNOSIS — Z86.69 HX OF SEIZURE DISORDER: Primary | ICD-10-CM

## 2025-08-05 LAB
ALBUMIN SERPL-MCNC: 4.8 G/DL (ref 3.5–5.2)
ALBUMIN/GLOB SERPL: 1.8 G/DL
ALP SERPL-CCNC: 84 U/L (ref 39–117)
ALT SERPL W P-5'-P-CCNC: 69 U/L (ref 1–41)
ANION GAP SERPL CALCULATED.3IONS-SCNC: 12.3 MMOL/L (ref 5–15)
AST SERPL-CCNC: 36 U/L (ref 1–40)
BASOPHILS # BLD AUTO: 0.04 10*3/MM3 (ref 0–0.2)
BASOPHILS NFR BLD AUTO: 0.6 % (ref 0–1.5)
BILIRUB SERPL-MCNC: 0.3 MG/DL (ref 0–1.2)
BUN SERPL-MCNC: 15.1 MG/DL (ref 8–23)
BUN/CREAT SERPL: 16.6 (ref 7–25)
CALCIUM SPEC-SCNC: 9.7 MG/DL (ref 8.6–10.5)
CHLORIDE SERPL-SCNC: 105 MMOL/L (ref 98–107)
CO2 SERPL-SCNC: 24.7 MMOL/L (ref 22–29)
CREAT SERPL-MCNC: 0.91 MG/DL (ref 0.76–1.27)
DEPRECATED RDW RBC AUTO: 41.2 FL (ref 37–54)
EGFRCR SERPLBLD CKD-EPI 2021: 94.1 ML/MIN/1.73
EOSINOPHIL # BLD AUTO: 0.14 10*3/MM3 (ref 0–0.4)
EOSINOPHIL NFR BLD AUTO: 1.9 % (ref 0.3–6.2)
ERYTHROCYTE [DISTWIDTH] IN BLOOD BY AUTOMATED COUNT: 13.2 % (ref 12.3–15.4)
GEN 5 1HR TROPONIN T REFLEX: 7 NG/L
GLOBULIN UR ELPH-MCNC: 2.7 GM/DL
GLUCOSE SERPL-MCNC: 118 MG/DL (ref 65–99)
HCT VFR BLD AUTO: 48.7 % (ref 37.5–51)
HGB BLD-MCNC: 16.1 G/DL (ref 13–17.7)
HOLD SPECIMEN: NORMAL
IMM GRANULOCYTES # BLD AUTO: 0.03 10*3/MM3 (ref 0–0.05)
IMM GRANULOCYTES NFR BLD AUTO: 0.4 % (ref 0–0.5)
LIPASE SERPL-CCNC: 23 U/L (ref 13–60)
LYMPHOCYTES # BLD AUTO: 2.19 10*3/MM3 (ref 0.7–3.1)
LYMPHOCYTES NFR BLD AUTO: 30.5 % (ref 19.6–45.3)
MCH RBC QN AUTO: 28 PG (ref 26.6–33)
MCHC RBC AUTO-ENTMCNC: 33.1 G/DL (ref 31.5–35.7)
MCV RBC AUTO: 84.8 FL (ref 79–97)
MONOCYTES # BLD AUTO: 0.74 10*3/MM3 (ref 0.1–0.9)
MONOCYTES NFR BLD AUTO: 10.3 % (ref 5–12)
NEUTROPHILS NFR BLD AUTO: 4.05 10*3/MM3 (ref 1.7–7)
NEUTROPHILS NFR BLD AUTO: 56.3 % (ref 42.7–76)
NT-PROBNP SERPL-MCNC: <36 PG/ML (ref 0–900)
PLATELET # BLD AUTO: 240 10*3/MM3 (ref 140–450)
PMV BLD AUTO: 9.5 FL (ref 6–12)
POTASSIUM SERPL-SCNC: 4.3 MMOL/L (ref 3.5–5.2)
PROT SERPL-MCNC: 7.5 G/DL (ref 6–8.5)
RBC # BLD AUTO: 5.74 10*6/MM3 (ref 4.14–5.8)
SODIUM SERPL-SCNC: 142 MMOL/L (ref 136–145)
TROPONIN T NUMERIC DELTA: 0 NG/L
TROPONIN T SERPL HS-MCNC: 7 NG/L
WBC NRBC COR # BLD AUTO: 7.19 10*3/MM3 (ref 3.4–10.8)
WHOLE BLOOD HOLD COAG: NORMAL
WHOLE BLOOD HOLD SPECIMEN: NORMAL

## 2025-08-05 PROCEDURE — 84484 ASSAY OF TROPONIN QUANT: CPT | Performed by: STUDENT IN AN ORGANIZED HEALTH CARE EDUCATION/TRAINING PROGRAM

## 2025-08-05 PROCEDURE — 99284 EMERGENCY DEPT VISIT MOD MDM: CPT | Performed by: STUDENT IN AN ORGANIZED HEALTH CARE EDUCATION/TRAINING PROGRAM

## 2025-08-05 PROCEDURE — 80053 COMPREHEN METABOLIC PANEL: CPT | Performed by: STUDENT IN AN ORGANIZED HEALTH CARE EDUCATION/TRAINING PROGRAM

## 2025-08-05 PROCEDURE — 85025 COMPLETE CBC W/AUTO DIFF WBC: CPT | Performed by: STUDENT IN AN ORGANIZED HEALTH CARE EDUCATION/TRAINING PROGRAM

## 2025-08-05 PROCEDURE — 93005 ELECTROCARDIOGRAM TRACING: CPT | Performed by: STUDENT IN AN ORGANIZED HEALTH CARE EDUCATION/TRAINING PROGRAM

## 2025-08-05 PROCEDURE — 83880 ASSAY OF NATRIURETIC PEPTIDE: CPT | Performed by: STUDENT IN AN ORGANIZED HEALTH CARE EDUCATION/TRAINING PROGRAM

## 2025-08-05 PROCEDURE — 25010000002 ONDANSETRON PER 1 MG: Performed by: PHYSICIAN ASSISTANT

## 2025-08-05 PROCEDURE — 96374 THER/PROPH/DIAG INJ IV PUSH: CPT

## 2025-08-05 PROCEDURE — 71045 X-RAY EXAM CHEST 1 VIEW: CPT

## 2025-08-05 PROCEDURE — 83690 ASSAY OF LIPASE: CPT | Performed by: STUDENT IN AN ORGANIZED HEALTH CARE EDUCATION/TRAINING PROGRAM

## 2025-08-05 PROCEDURE — 36415 COLL VENOUS BLD VENIPUNCTURE: CPT

## 2025-08-05 RX ORDER — ASPIRIN 81 MG/1
324 TABLET, CHEWABLE ORAL ONCE
Status: COMPLETED | OUTPATIENT
Start: 2025-08-05 | End: 2025-08-05

## 2025-08-05 RX ORDER — ONDANSETRON 2 MG/ML
4 INJECTION INTRAMUSCULAR; INTRAVENOUS ONCE
Status: COMPLETED | OUTPATIENT
Start: 2025-08-05 | End: 2025-08-05

## 2025-08-05 RX ORDER — SODIUM CHLORIDE 0.9 % (FLUSH) 0.9 %
10 SYRINGE (ML) INJECTION AS NEEDED
Status: DISCONTINUED | OUTPATIENT
Start: 2025-08-05 | End: 2025-08-05 | Stop reason: HOSPADM

## 2025-08-05 RX ADMIN — ASPIRIN 81 MG CHEWABLE TABLET 324 MG: 81 TABLET CHEWABLE at 14:53

## 2025-08-05 RX ADMIN — ONDANSETRON 4 MG: 2 INJECTION, SOLUTION INTRAMUSCULAR; INTRAVENOUS at 16:11

## 2025-08-06 ENCOUNTER — TELEPHONE (OUTPATIENT)
Facility: HOSPITAL | Age: 65
End: 2025-08-06
Payer: COMMERCIAL

## 2025-08-06 RX ORDER — LEVETIRACETAM 1000 MG/1
1500 TABLET ORAL 2 TIMES DAILY
Qty: 270 TABLET | Refills: 3 | Status: SHIPPED | OUTPATIENT
Start: 2025-08-06

## 2025-08-07 LAB
QT INTERVAL: 368 MS
QT INTERVAL: 372 MS
QTC INTERVAL: 439 MS
QTC INTERVAL: 450 MS